# Patient Record
Sex: FEMALE | Race: WHITE | NOT HISPANIC OR LATINO | Employment: OTHER | URBAN - METROPOLITAN AREA
[De-identification: names, ages, dates, MRNs, and addresses within clinical notes are randomized per-mention and may not be internally consistent; named-entity substitution may affect disease eponyms.]

---

## 2018-03-16 ENCOUNTER — TELEPHONE (OUTPATIENT)
Dept: FAMILY MEDICINE | Facility: CLINIC | Age: 48
End: 2018-03-16

## 2018-03-16 RX ORDER — DESONIDE 0.5 MG/G
OINTMENT TOPICAL 2 TIMES DAILY
Qty: 60 G | Refills: 0 | Status: SHIPPED | OUTPATIENT
Start: 2018-03-16 | End: 2018-11-07 | Stop reason: ENTERED-IN-ERROR

## 2018-03-16 RX ORDER — LEVOTHYROXINE SODIUM 112 UG/1
112 TABLET ORAL DAILY
Qty: 90 TABLET | Refills: 1 | Status: SHIPPED | OUTPATIENT
Start: 2018-03-16 | End: 2018-09-05 | Stop reason: SDUPTHER

## 2018-03-16 RX ORDER — DESONIDE 0.5 MG/G
0.05 OINTMENT TOPICAL
COMMUNITY
Start: 2018-01-15 | End: 2018-03-16 | Stop reason: SDUPTHER

## 2018-03-16 RX ORDER — LEVOTHYROXINE SODIUM 112 UG/1
TABLET ORAL
COMMUNITY
Start: 2017-06-20 | End: 2018-03-16 | Stop reason: SDUPTHER

## 2018-03-16 NOTE — TELEPHONE ENCOUNTER
Patient stopped in to request refill of Synthroid and Cream for allergic reactions on face (is working, uses PRN)- [facility] [address] [facility]. Patient # with Qs . dk

## 2018-03-16 NOTE — TELEPHONE ENCOUNTER
Noted these were sent over the [facility], could not find one on [address].

## 2018-05-04 ENCOUNTER — TRANSCRIBE ORDERS (OUTPATIENT)
Dept: RADIOLOGY | Age: 48
End: 2018-05-04

## 2018-05-04 ENCOUNTER — APPOINTMENT (OUTPATIENT)
Dept: RADIOLOGY | Age: 48
End: 2018-05-04
Attending: ORTHOPAEDIC SURGERY
Payer: COMMERCIAL

## 2018-05-04 DIAGNOSIS — Z96.651 PRESENCE OF RIGHT ARTIFICIAL KNEE JOINT: Primary | ICD-10-CM

## 2018-05-04 DIAGNOSIS — Z96.651 PRESENCE OF RIGHT ARTIFICIAL KNEE JOINT: ICD-10-CM

## 2018-05-04 PROCEDURE — 73564 X-RAY EXAM KNEE 4 OR MORE: CPT | Mod: RT

## 2018-09-07 RX ORDER — LEVOTHYROXINE SODIUM 112 UG/1
TABLET ORAL
Qty: 90 TABLET | Refills: 0 | Status: SHIPPED | OUTPATIENT
Start: 2018-09-07 | End: 2018-12-06 | Stop reason: SDUPTHER

## 2018-10-10 ENCOUNTER — OFFICE VISIT (OUTPATIENT)
Dept: FAMILY MEDICINE | Facility: CLINIC | Age: 48
End: 2018-10-10
Payer: COMMERCIAL

## 2018-10-10 VITALS
TEMPERATURE: 97.7 F | OXYGEN SATURATION: 99 % | SYSTOLIC BLOOD PRESSURE: 122 MMHG | DIASTOLIC BLOOD PRESSURE: 86 MMHG | HEART RATE: 66 BPM | WEIGHT: 120 LBS

## 2018-10-10 DIAGNOSIS — Z23 NEED FOR IMMUNIZATION AGAINST INFLUENZA: ICD-10-CM

## 2018-10-10 DIAGNOSIS — M62.830 SPASM OF MUSCLE OF LOWER BACK: Primary | ICD-10-CM

## 2018-10-10 PROCEDURE — 99213 OFFICE O/P EST LOW 20 MIN: CPT | Mod: 25 | Performed by: FAMILY MEDICINE

## 2018-10-10 PROCEDURE — 90686 IIV4 VACC NO PRSV 0.5 ML IM: CPT | Performed by: FAMILY MEDICINE

## 2018-10-10 PROCEDURE — 90471 IMMUNIZATION ADMIN: CPT | Performed by: FAMILY MEDICINE

## 2018-10-10 RX ORDER — CYCLOBENZAPRINE HCL 5 MG
5 TABLET ORAL 3 TIMES DAILY PRN
Qty: 21 TABLET | Refills: 0 | Status: SHIPPED | OUTPATIENT
Start: 2018-10-10 | End: 2018-11-07 | Stop reason: ENTERED-IN-ERROR

## 2018-10-10 RX ORDER — OLOPATADINE HYDROCHLORIDE 1 MG/ML
0.1 SOLUTION/ DROPS OPHTHALMIC DAILY PRN
Status: ON HOLD | COMMUNITY
Start: 2018-01-02 | End: 2018-12-18

## 2018-10-10 RX ORDER — NAPROXEN 500 MG/1
500 TABLET ORAL 2 TIMES DAILY WITH MEALS
Qty: 28 TABLET | Refills: 0 | Status: SHIPPED | OUTPATIENT
Start: 2018-10-10 | End: 2018-11-07 | Stop reason: ENTERED-IN-ERROR

## 2018-10-10 ASSESSMENT — ENCOUNTER SYMPTOMS
DIARRHEA: 0
WEIGHT LOSS: 0
SHORTNESS OF BREATH: 0
ABDOMINAL DISTENTION: 0
HEADACHES: 0
WEAKNESS: 0
VOMITING: 0
LIGHT-HEADEDNESS: 0
FATIGUE: 0
FEVER: 0
BLOOD IN STOOL: 0
PARESIS: 0
TROUBLE SWALLOWING: 0
NUMBNESS: 0
ADENOPATHY: 0
PALPITATIONS: 0
DIFFICULTY URINATING: 0
CONSTIPATION: 0
COUGH: 0
PERIANAL NUMBNESS: 0
TINGLING: 0
PARESTHESIAS: 0
DIZZINESS: 0
BOWEL INCONTINENCE: 0
UNEXPECTED WEIGHT CHANGE: 0
NAUSEA: 0
BACK PAIN: 1
ABDOMINAL PAIN: 0
LEG PAIN: 0
DYSURIA: 0
WHEEZING: 0

## 2018-10-10 NOTE — PATIENT INSTRUCTIONS
Patient Education     Back Pain, Adult  Back pain is very common in adults. The cause of back pain is rarely dangerous and the pain often gets better over time. The cause of your back pain may not be known. Some common causes of back pain include:  · Strain of the muscles or ligaments supporting the spine.  · Wear and tear (degeneration) of the spinal disks.  · Arthritis.  · Direct injury to the back.  For many people, back pain may return. Since back pain is rarely dangerous, most people can learn to manage this condition on their own.  Follow these instructions at home:  Watch your back pain for any changes. The following actions may help to lessen any discomfort you are feeling:  · Remain active. It is stressful on your back to sit or  one place for long periods of time. Do not sit, drive, or  one place for more than 30 minutes at a time. Take short walks on even surfaces as soon as you are able. Try to increase the length of time you walk each day.  · Exercise regularly as directed by your health care provider. Exercise helps your back heal faster. It also helps avoid future injury by keeping your muscles strong and flexible.  · Do not stay in bed. Resting more than 1-2 days can delay your recovery.  · Pay attention to your body when you bend and lift. The most comfortable positions are those that put less stress on your recovering back. Always use proper lifting techniques, including:  ¨ Bending your knees.  ¨ Keeping the load close to your body.  ¨ Avoiding twisting.  · Find a comfortable position to sleep. Use a firm mattress and lie on your side with your knees slightly bent. If you lie on your back, put a pillow under your knees.  · Avoid feeling anxious or stressed. Stress increases muscle tension and can worsen back pain. It is important to recognize when you are anxious or stressed and learn ways to manage it, such as with exercise.  · Take medicines only as directed by your health care  provider. Over-the-counter medicines to reduce pain and inflammation are often the most helpful. Your health care provider may prescribe muscle relaxant drugs. These medicines help dull your pain so you can more quickly return to your normal activities and healthy exercise.  · Apply ice to the injured area:  ¨ Put ice in a plastic bag.  ¨ Place a towel between your skin and the bag.  ¨ Leave the ice on for 20 minutes, 2-3 times a day for the first 2-3 days. After that, ice and heat may be alternated to reduce pain and spasms.  · Maintain a healthy weight. Excess weight puts extra stress on your back and makes it difficult to maintain good posture.  Contact a health care provider if:  · You have pain that is not relieved with rest or medicine.  · You have increasing pain going down into the legs or buttocks.  · You have pain that does not improve in one week.  · You have night pain.  · You lose weight.  · You have a fever or chills.  Get help right away if:  · You develop new bowel or bladder control problems.  · You have unusual weakness or numbness in your arms or legs.  · You develop nausea or vomiting.  · You develop abdominal pain.  · You feel faint.  This information is not intended to replace advice given to you by your health care provider. Make sure you discuss any questions you have with your health care provider.  Document Released: 12/18/2006 Document Revised: 04/27/2017 Document Reviewed: 04/21/2015  [employer] Interactive Patient Education © 2017 [employer]

## 2018-10-25 ENCOUNTER — TRANSCRIBE ORDERS (OUTPATIENT)
Dept: SCHEDULING | Age: 48
End: 2018-10-25

## 2018-10-25 DIAGNOSIS — Z12.31 ENCOUNTER FOR SCREENING MAMMOGRAM FOR MALIGNANT NEOPLASM OF BREAST: Primary | ICD-10-CM

## 2018-10-29 ENCOUNTER — HOSPITAL ENCOUNTER (OUTPATIENT)
Dept: RADIOLOGY | Age: 48
Discharge: HOME | End: 2018-10-29
Attending: OBSTETRICS & GYNECOLOGY
Payer: COMMERCIAL

## 2018-10-29 DIAGNOSIS — Z12.31 ENCOUNTER FOR SCREENING MAMMOGRAM FOR MALIGNANT NEOPLASM OF BREAST: ICD-10-CM

## 2018-10-29 PROCEDURE — 77067 SCR MAMMO BI INCL CAD: CPT

## 2018-11-07 ENCOUNTER — OFFICE VISIT (OUTPATIENT)
Dept: FAMILY MEDICINE | Facility: CLINIC | Age: 48
End: 2018-11-07
Attending: FAMILY MEDICINE
Payer: COMMERCIAL

## 2018-11-07 VITALS
TEMPERATURE: 98 F | HEART RATE: 72 BPM | HEIGHT: 57 IN | WEIGHT: 116 LBS | BODY MASS INDEX: 25.03 KG/M2 | RESPIRATION RATE: 16 BRPM | OXYGEN SATURATION: 98 % | DIASTOLIC BLOOD PRESSURE: 68 MMHG | SYSTOLIC BLOOD PRESSURE: 120 MMHG

## 2018-11-07 DIAGNOSIS — Z00.00 ROUTINE PHYSICAL EXAMINATION: Primary | ICD-10-CM

## 2018-11-07 DIAGNOSIS — E03.9 ACQUIRED HYPOTHYROIDISM: ICD-10-CM

## 2018-11-07 PROCEDURE — 99396 PREV VISIT EST AGE 40-64: CPT | Performed by: FAMILY MEDICINE

## 2018-11-07 NOTE — PROGRESS NOTES
Subjective      Patient ID: Sharon Morales is a 48 y.o. female.    She is here for Pe. Pt has been doing well overall. She is trying to follow well balanced meals. Stays well hydrated. She is active 6x wk with swimming/weights and cardio. Average 7-8hrs of sleep a night. Normal BMs and urination. Normal menstrual cycles. UTD with gyn care. UTD with eye and dental exam. Normal BMs and urination. Due for colonoscopy soon. She is having another knee surgery next month. Due for fasting labs.         The following have been reviewed and updated as appropriate in this visit:  Tobacco  Allergies  Meds  Problems  Med Hx  Surg Hx  Fam Hx       Review of Systems    Current Outpatient Prescriptions   Medication Sig Dispense Refill   • levothyroxine (SYNTHROID) 112 mcg tablet TAKE 1 TABLET BY MOUTH EVERY DAY 90 tablet 0   • olopatadine (PATANOL) 0.1 % ophthalmic solution 0.1 %.       No current facility-administered medications for this visit.      Past Medical History:   Diagnosis Date   • Hypothyroidism      Family History   Problem Relation Age of Onset   • Stomach cancer Mother    • Hypertension Mother    • Skin cancer Father      Past Surgical History:   Procedure Laterality Date   •  SECTION     • FOOT SURGERY     • JOINT REPLACEMENT Bilateral    • TOTAL KNEE ARTHROPLASTY Right 2018    replace previous knee replacement     Social History     Social History   • Marital status:      Spouse name: N/A   • Number of children: N/A   • Years of education: N/A     Occupational History   • Not on file.     Social History Main Topics   • Smoking status: Never Smoker   • Smokeless tobacco: Never Used   • Alcohol use Yes   • Drug use: No   • Sexual activity: Not on file     Other Topics Concern   • Not on file     Social History Narrative   • No narrative on file     Allergies   Allergen Reactions   • Avocado      Other reaction(s): vomiting       Objective   /68 (BP Location: Right upper arm, Patient  "Position: Sitting)   Pulse 72   Temp 36.7 °C (98 °F) (Oral)   Resp 16   Ht 1.448 m (4' 9\")   Wt 52.6 kg (116 lb)   LMP 10/09/2018 (Exact Date)   SpO2 98%   BMI 25.10 kg/m²   Physical Exam   Constitutional: She is oriented to person, place, and time. She appears well-developed and well-nourished.   HENT:   Head: Normocephalic and atraumatic.   Right Ear: External ear normal.   Left Ear: External ear normal.   Eyes: Conjunctivae are normal. Pupils are equal, round, and reactive to light.   Neck: Normal range of motion. Neck supple. No thyromegaly present.   Cardiovascular: Normal rate, regular rhythm and normal heart sounds.    No murmur heard.  Pulmonary/Chest: Effort normal and breath sounds normal. She has no wheezes.   Abdominal: Soft. Bowel sounds are normal. There is no tenderness.   Musculoskeletal: Normal range of motion. She exhibits no tenderness.   Lymphadenopathy:     She has no cervical adenopathy.   Neurological: She is alert and oriented to person, place, and time. No cranial nerve deficit.   Skin: Skin is warm and dry. No rash noted.   Psychiatric: She has a normal mood and affect.   Nursing note and vitals reviewed.      Assessment/Plan   Problem List Items Addressed This Visit     Acquired hypothyroidism    Relevant Orders    TSH 3rd Generation    T4, free      Other Visit Diagnoses     Routine physical examination    -  Primary    Relevant Orders    Comprehensive metabolic panel    TSH 3rd Generation    CBC    Lipid panel    Urinalysis with Reflex Culture      She is doing well overall. Discussed diet and exercise. UTD with gyn care. UTD with eye and dental exam. UTD with immunizations. Will check fasting labs. She will set up colonoscopy for this yr or early next yr.   Addendum: She had EKG done which is stable from prior one along with labs which are ok but white count alittle on lower end but discussed with hematologist and patient is ok to move forward with upcoming surgery.    Natalia PORTILLO" DO Katie  11/7/2018

## 2018-11-09 ENCOUNTER — TELEPHONE (OUTPATIENT)
Dept: FAMILY MEDICINE | Facility: CLINIC | Age: 48
End: 2018-11-09

## 2018-11-09 DIAGNOSIS — D70.9 NEUTROPENIA, UNSPECIFIED TYPE (CMS/HCC): ICD-10-CM

## 2018-11-09 DIAGNOSIS — R31.9 HEMATURIA, UNSPECIFIED TYPE: Primary | ICD-10-CM

## 2018-11-10 LAB
ALBUMIN SERPL-MCNC: 4.1 G/DL (ref 3.6–5.1)
ALBUMIN/GLOB SERPL: 1.6 (CALC) (ref 1–2.5)
ALP SERPL-CCNC: 75 U/L (ref 33–130)
ALT SERPL-CCNC: 11 U/L (ref 6–29)
APPEARANCE UR: ABNORMAL
AST SERPL-CCNC: 21 U/L (ref 10–35)
BACTERIA #/AREA URNS HPF: ABNORMAL /HPF
BACTERIA UR CULT: ABNORMAL
BACTERIA UR CULT: NORMAL
BILIRUB SERPL-MCNC: 0.4 MG/DL (ref 0.2–1.2)
BILIRUB UR QL STRIP: NEGATIVE
BUN SERPL-MCNC: 16 MG/DL (ref 7–25)
BUN/CREAT SERPL: NORMAL (CALC) (ref 6–22)
CALCIUM SERPL-MCNC: 9.1 MG/DL (ref 8.6–10.4)
CHLORIDE SERPL-SCNC: 107 MMOL/L (ref 98–110)
CHOLEST SERPL-MCNC: 169 MG/DL
CHOLEST/HDLC SERPL: 2.5 (CALC)
CO2 SERPL-SCNC: 26 MMOL/L (ref 20–32)
COLOR UR: YELLOW
CREAT SERPL-MCNC: 0.65 MG/DL (ref 0.5–1.05)
ERYTHROCYTE [DISTWIDTH] IN BLOOD BY AUTOMATED COUNT: 13.8 % (ref 11–15)
GFR SERPL CREATININE-BSD FRML MDRD: 104 ML/MIN/1.73M2
GLOBULIN SER CALC-MCNC: 2.5 G/DL (CALC) (ref 1.9–3.7)
GLUCOSE SERPL-MCNC: 84 MG/DL (ref 65–99)
GLUCOSE UR QL STRIP: NEGATIVE
HCT VFR BLD AUTO: 38.3 % (ref 35–45)
HDLC SERPL-MCNC: 68 MG/DL
HGB BLD-MCNC: 12.2 G/DL (ref 11.7–15.5)
HGB UR QL STRIP: ABNORMAL
HYALINE CASTS #/AREA URNS LPF: ABNORMAL /LPF
KETONES UR QL STRIP: NEGATIVE
LDLC SERPL CALC-MCNC: 87 MG/DL (CALC)
LEUKOCYTE ESTERASE UR QL STRIP: ABNORMAL
MCH RBC QN AUTO: 27.6 PG (ref 27–33)
MCHC RBC AUTO-ENTMCNC: 31.9 G/DL (ref 32–36)
MCV RBC AUTO: 86.7 FL (ref 80–100)
NITRITE UR QL STRIP: NEGATIVE
NONHDLC SERPL-MCNC: 101 MG/DL (CALC)
PH UR STRIP: 6.5 [PH] (ref 5–8)
PLATELET # BLD AUTO: 265 THOUSAND/UL (ref 140–400)
PMV BLD REES-ECKER: 10.7 FL (ref 7.5–12.5)
POTASSIUM SERPL-SCNC: 4.6 MMOL/L (ref 3.5–5.3)
PROT SERPL-MCNC: 6.6 G/DL (ref 6.1–8.1)
PROT UR QL STRIP: NEGATIVE
RBC # BLD AUTO: 4.42 MILLION/UL (ref 3.8–5.1)
RBC #/AREA URNS HPF: ABNORMAL /HPF
SODIUM SERPL-SCNC: 142 MMOL/L (ref 135–146)
SP GR UR STRIP: 1.01 (ref 1–1.03)
SQUAMOUS #/AREA URNS HPF: ABNORMAL /HPF
T4 FREE SERPL-MCNC: 1.5 NG/DL (ref 0.8–1.8)
TRIGL SERPL-MCNC: 49 MG/DL
TSH SERPL-ACNC: 0.45 MIU/L
WBC # BLD AUTO: 2.6 THOUSAND/UL (ref 3.8–10.8)
WBC #/AREA URNS HPF: ABNORMAL /HPF

## 2018-11-11 ENCOUNTER — TELEPHONE (OUTPATIENT)
Dept: FAMILY MEDICINE | Facility: CLINIC | Age: 48
End: 2018-11-11

## 2018-11-12 NOTE — TELEPHONE ENCOUNTER
Please let her know labs ok except white count is low at 2.6 goal >3.8 but has been low in the past as well and urine does show blood in it as well- so check to see if she was on her period or if any UTI symptoms. I would like the white count and urine repeated in 2wks - need to have this redone before upcoming surgery !

## 2018-11-12 NOTE — TELEPHONE ENCOUNTER
Patient notified of labs and will repeat again in 2 weeks; patient notes she was on the end of her menses; also she is asking if she needs to come in for a pre-op or if you are willing to sign off on surgery as it will be past 30 days.

## 2018-11-27 ENCOUNTER — TELEPHONE (OUTPATIENT)
Dept: FAMILY MEDICINE | Facility: CLINIC | Age: 48
End: 2018-11-27

## 2018-11-27 DIAGNOSIS — D70.9 NEUTROPENIA, UNSPECIFIED TYPE (CMS/HCC): Primary | ICD-10-CM

## 2018-11-28 LAB
APPEARANCE UR: CLEAR
BACTERIA #/AREA URNS HPF: ABNORMAL /HPF
BACTERIA UR CULT: NORMAL
BACTERIA UR CULT: NORMAL
BASOPHILS # BLD AUTO: 21 CELLS/UL (ref 0–200)
BASOPHILS NFR BLD AUTO: 0.7 %
BILIRUB UR QL STRIP: NEGATIVE
COLOR UR: YELLOW
EOSINOPHIL # BLD AUTO: 210 CELLS/UL (ref 15–500)
EOSINOPHIL NFR BLD AUTO: 7 %
ERYTHROCYTE [DISTWIDTH] IN BLOOD BY AUTOMATED COUNT: 13.8 % (ref 11–15)
GLUCOSE UR QL STRIP: NEGATIVE
HCT VFR BLD AUTO: 38.8 % (ref 35–45)
HGB BLD-MCNC: 12.5 G/DL (ref 11.7–15.5)
HGB UR QL STRIP: NEGATIVE
HYALINE CASTS #/AREA URNS LPF: ABNORMAL /LPF
KETONES UR QL STRIP: NEGATIVE
LEUKOCYTE ESTERASE UR QL STRIP: ABNORMAL
LYMPHOCYTES # BLD AUTO: 1158 CELLS/UL (ref 850–3900)
LYMPHOCYTES NFR BLD AUTO: 38.6 %
MCH RBC QN AUTO: 28.5 PG (ref 27–33)
MCHC RBC AUTO-ENTMCNC: 32.2 G/DL (ref 32–36)
MCV RBC AUTO: 88.4 FL (ref 80–100)
MONOCYTES # BLD AUTO: 402 CELLS/UL (ref 200–950)
MONOCYTES NFR BLD AUTO: 13.4 %
NEUTROPHILS # BLD AUTO: 1209 CELLS/UL (ref 1500–7800)
NEUTROPHILS NFR BLD AUTO: 40.3 %
NITRITE UR QL STRIP: NEGATIVE
PH UR STRIP: 6.5 [PH] (ref 5–8)
PLATELET # BLD AUTO: 233 THOUSAND/UL (ref 140–400)
PMV BLD REES-ECKER: 10.6 FL (ref 7.5–12.5)
PROT UR QL STRIP: NEGATIVE
RBC # BLD AUTO: 4.39 MILLION/UL (ref 3.8–5.1)
RBC #/AREA URNS HPF: ABNORMAL /HPF
SP GR UR STRIP: 1.01 (ref 1–1.03)
SQUAMOUS #/AREA URNS HPF: ABNORMAL /HPF
WBC # BLD AUTO: 3 THOUSAND/UL (ref 3.8–10.8)
WBC #/AREA URNS HPF: ABNORMAL /HPF

## 2018-11-28 NOTE — TELEPHONE ENCOUNTER
White count getting better and talked to heme and will recheck next wk but looks like ok to go through with surgery

## 2018-11-28 NOTE — TELEPHONE ENCOUNTER
Patient aware; she has to get labs for pre-op on Friday (and is leaving next week and returning 5 days prior to surgery), so she is going to repeat CBC this Friday at [place]

## 2018-11-30 ENCOUNTER — HOSPITAL ENCOUNTER (OUTPATIENT)
Dept: CARDIOLOGY | Facility: HOSPITAL | Age: 48
Discharge: HOME | End: 2018-11-30
Attending: ORTHOPAEDIC SURGERY
Payer: COMMERCIAL

## 2018-11-30 ENCOUNTER — APPOINTMENT (OUTPATIENT)
Dept: LAB | Facility: HOSPITAL | Age: 48
End: 2018-11-30
Attending: ORTHOPAEDIC SURGERY
Payer: COMMERCIAL

## 2018-11-30 ENCOUNTER — APPOINTMENT (OUTPATIENT)
Dept: LAB | Facility: HOSPITAL | Age: 48
End: 2018-11-30
Attending: FAMILY MEDICINE
Payer: COMMERCIAL

## 2018-11-30 ENCOUNTER — TRANSCRIBE ORDERS (OUTPATIENT)
Dept: REGISTRATION | Facility: HOSPITAL | Age: 48
End: 2018-11-30

## 2018-11-30 DIAGNOSIS — D70.9 NEUTROPENIA, UNSPECIFIED TYPE (CMS/HCC): ICD-10-CM

## 2018-11-30 DIAGNOSIS — Z96.652 PRESENCE OF LEFT ARTIFICIAL KNEE JOINT: ICD-10-CM

## 2018-11-30 DIAGNOSIS — M25.662 STIFFNESS OF LEFT KNEE, NOT ELSEWHERE CLASSIFIED: Primary | ICD-10-CM

## 2018-11-30 DIAGNOSIS — M25.662 STIFFNESS OF LEFT KNEE, NOT ELSEWHERE CLASSIFIED: ICD-10-CM

## 2018-11-30 DIAGNOSIS — Z00.00 ROUTINE PHYSICAL EXAMINATION: ICD-10-CM

## 2018-11-30 LAB
ALBUMIN SERPL-MCNC: 4 G/DL (ref 3.4–5)
ALP SERPL-CCNC: 69 IU/L (ref 35–126)
ALT SERPL-CCNC: 19 IU/L (ref 11–54)
ANION GAP SERPL CALC-SCNC: 10 MEQ/L (ref 3–15)
AST SERPL-CCNC: 30 IU/L (ref 15–41)
BACTERIA URNS QL MICRO: 1 /HPF
BASOPHILS # BLD: 0.03 K/UL (ref 0.01–0.1)
BASOPHILS NFR BLD: 1 %
BILIRUB SERPL-MCNC: 0.6 MG/DL (ref 0.3–1.2)
BILIRUB UR QL STRIP.AUTO: NEGATIVE MG/DL
BUN SERPL-MCNC: 15 MG/DL (ref 8–20)
BURR CELLS BLD QL SMEAR: ABNORMAL
CALCIUM SERPL-MCNC: 9.3 MG/DL (ref 8.9–10.3)
CHLORIDE SERPL-SCNC: 101 MEQ/L (ref 98–109)
CLARITY UR REFRACT.AUTO: CLEAR
CO2 SERPL-SCNC: 26 MEQ/L (ref 22–32)
COLOR UR AUTO: YELLOW
CREAT SERPL-MCNC: 0.6 MG/DL
DIFFERENTIAL METHOD BLD: ABNORMAL
EOSINOPHIL # BLD: 0.08 K/UL (ref 0.04–0.36)
EOSINOPHIL NFR BLD: 2.6 %
ERYTHROCYTE [DISTWIDTH] IN BLOOD BY AUTOMATED COUNT: 14.2 % (ref 11.7–14.4)
GFR SERPL CREATININE-BSD FRML MDRD: >60 ML/MIN/1.73M*2
GLUCOSE SERPL-MCNC: 81 MG/DL (ref 70–99)
GLUCOSE UR STRIP.AUTO-MCNC: NEGATIVE MG/DL
HCT VFR BLDCO AUTO: 39.5 %
HGB BLD-MCNC: 12.4 G/DL
HGB UR QL STRIP.AUTO: NEGATIVE
HYALINE CASTS #/AREA URNS LPF: ABNORMAL /LPF
IMM GRANULOCYTES # BLD AUTO: 0.01 K/UL (ref 0–0.08)
IMM GRANULOCYTES NFR BLD AUTO: 0.3 %
KETONES UR STRIP.AUTO-MCNC: ABNORMAL MG/DL
LEUKOCYTE ESTERASE UR QL STRIP.AUTO: ABNORMAL
LYMPHOCYTES # BLD: 1.05 K/UL (ref 1.2–3.5)
LYMPHOCYTES NFR BLD: 34.8 %
MCH RBC QN AUTO: 27.4 PG (ref 28–33.2)
MCHC RBC AUTO-ENTMCNC: 31.4 G/DL (ref 32.2–35.5)
MCV RBC AUTO: 87.2 FL (ref 83–98)
MONOCYTES # BLD: 0.4 K/UL (ref 0.28–0.8)
MONOCYTES NFR BLD: 13.2 %
NEUTROPHILS # BLD: 1.45 K/UL (ref 1.7–7)
NEUTS SEG NFR BLD: 48.1 %
NITRITE UR QL STRIP.AUTO: NEGATIVE
NRBC BLD-RTO: 0 %
PDW BLD AUTO: 11 FL (ref 9.4–12.3)
PH UR STRIP.AUTO: 6.5 [PH]
PLAT MORPH BLD: NORMAL
PLATELET # BLD AUTO: 222 K/UL
PLATELET # BLD EST: ABNORMAL 10*3/UL
POLYCHROMASIA BLD QL SMEAR: ABNORMAL
POTASSIUM SERPL-SCNC: 4.6 MEQ/L (ref 3.6–5.1)
PROT SERPL-MCNC: 6.5 G/DL (ref 6–8.2)
PROT UR QL STRIP.AUTO: NEGATIVE
RBC # BLD AUTO: 4.53 M/UL (ref 3.93–5.22)
RBC #/AREA URNS HPF: ABNORMAL /HPF
SODIUM SERPL-SCNC: 137 MEQ/L (ref 136–144)
SP GR UR REFRACT.AUTO: 1.01
SQUAMOUS URNS QL MICRO: 1 /HPF
UROBILINOGEN UR STRIP-ACNC: 0.2 EU/DL
WBC # BLD AUTO: 3.02 K/UL
WBC #/AREA URNS HPF: ABNORMAL /HPF

## 2018-11-30 PROCEDURE — 85025 COMPLETE CBC W/AUTO DIFF WBC: CPT

## 2018-11-30 PROCEDURE — 80053 COMPREHEN METABOLIC PANEL: CPT

## 2018-11-30 PROCEDURE — 36415 COLL VENOUS BLD VENIPUNCTURE: CPT

## 2018-11-30 PROCEDURE — 87086 URINE CULTURE/COLONY COUNT: CPT

## 2018-11-30 PROCEDURE — 81003 URINALYSIS AUTO W/O SCOPE: CPT

## 2018-11-30 PROCEDURE — 93005 ELECTROCARDIOGRAM TRACING: CPT

## 2018-12-01 LAB
ATRIAL RATE: 65
P AXIS: 6
PR INTERVAL: 120
QRS DURATION: 76
QT INTERVAL: 404
QTC CALCULATION(BAZETT): 420
R AXIS: 0
T WAVE AXIS: -1
VENTRICULAR RATE: 65

## 2018-12-01 PROCEDURE — 93010 ELECTROCARDIOGRAM REPORT: CPT | Performed by: INTERNAL MEDICINE

## 2018-12-02 LAB — BACTERIA UR CULT: NORMAL

## 2018-12-03 ENCOUNTER — TELEPHONE (OUTPATIENT)
Dept: FAMILY MEDICINE | Facility: CLINIC | Age: 48
End: 2018-12-03

## 2018-12-03 ENCOUNTER — ANESTHESIA EVENT (OUTPATIENT)
Dept: OPERATING ROOM | Facility: HOSPITAL | Age: 48
Setting detail: HOSPITAL OUTPATIENT SURGERY
DRG: 467 | End: 2018-12-03
Payer: COMMERCIAL

## 2018-12-03 NOTE — TELEPHONE ENCOUNTER
Please let her know repeat white count is coming back stable at 3 which is on the low end (range 3.8-10) but I am clearing her to move forward with the surgery and we may need to see hematologist after surgery if white count stays low

## 2018-12-18 ENCOUNTER — ANESTHESIA (OUTPATIENT)
Dept: OPERATING ROOM | Facility: HOSPITAL | Age: 48
Setting detail: HOSPITAL OUTPATIENT SURGERY
DRG: 467 | End: 2018-12-18
Payer: COMMERCIAL

## 2018-12-18 ENCOUNTER — HOSPITAL ENCOUNTER (INPATIENT)
Facility: HOSPITAL | Age: 48
LOS: 1 days | Discharge: HOME | DRG: 467 | End: 2018-12-19
Attending: ORTHOPAEDIC SURGERY | Admitting: ORTHOPAEDIC SURGERY
Payer: COMMERCIAL

## 2018-12-18 ENCOUNTER — APPOINTMENT (INPATIENT)
Dept: RADIOLOGY | Facility: HOSPITAL | Age: 48
DRG: 467 | End: 2018-12-18
Attending: ORTHOPAEDIC SURGERY
Payer: COMMERCIAL

## 2018-12-18 DIAGNOSIS — M25.662 KNEE STIFFNESS, LEFT: ICD-10-CM

## 2018-12-18 DIAGNOSIS — Z96.652 S/P TKR (TOTAL KNEE REPLACEMENT), LEFT: ICD-10-CM

## 2018-12-18 PROBLEM — R52 PAIN MANAGEMENT: Status: ACTIVE | Noted: 2018-12-18

## 2018-12-18 PROBLEM — D72.819 LEUKOPENIA: Status: ACTIVE | Noted: 2018-12-18

## 2018-12-18 PROBLEM — K59.00 CONSTIPATION: Status: ACTIVE | Noted: 2018-12-18

## 2018-12-18 PROBLEM — Z91.89 AT RISK FOR VENOUS THROMBOEMBOLISM (VTE): Status: ACTIVE | Noted: 2018-12-18

## 2018-12-18 LAB
ABO + RH BLD: NORMAL
B-HCG UR QL: NEGATIVE
BLD GP AB SCN SERPL QL: NEGATIVE
D AG BLD QL: NEGATIVE
LABORATORY COMMENT REPORT: NORMAL
POCT TEST: NORMAL

## 2018-12-18 PROCEDURE — 87070 CULTURE OTHR SPECIMN AEROBIC: CPT | Performed by: ORTHOPAEDIC SURGERY

## 2018-12-18 PROCEDURE — 0SPW0JZ REMOVAL OF SYNTHETIC SUBSTITUTE FROM LEFT KNEE JOINT, TIBIAL SURFACE, OPEN APPROACH: ICD-10-PCS | Performed by: ORTHOPAEDIC SURGERY

## 2018-12-18 PROCEDURE — 87205 SMEAR GRAM STAIN: CPT | Performed by: ORTHOPAEDIC SURGERY

## 2018-12-18 PROCEDURE — 36000005 HC OR LEVEL 5 INITIAL 30MIN: Performed by: ORTHOPAEDIC SURGERY

## 2018-12-18 PROCEDURE — 20600000 HC ROOM AND CARE INTERMEDIATE/TELEMETRY

## 2018-12-18 PROCEDURE — 37000010 HC ANESTHESIA SPINAL: Performed by: ORTHOPAEDIC SURGERY

## 2018-12-18 PROCEDURE — 12000000 HC ROOM AND CARE MED/SURG

## 2018-12-18 PROCEDURE — G8978 MOBILITY CURRENT STATUS: HCPCS | Mod: GP,CJ

## 2018-12-18 PROCEDURE — 0SRU0J9 REPLACEMENT OF LEFT KNEE JOINT, FEMORAL SURFACE WITH SYNTHETIC SUBSTITUTE, CEMENTED, OPEN APPROACH: ICD-10-PCS | Performed by: ORTHOPAEDIC SURGERY

## 2018-12-18 PROCEDURE — 25000000 HC PHARMACY GENERAL: Performed by: NURSE ANESTHETIST, CERTIFIED REGISTERED

## 2018-12-18 PROCEDURE — 63600000 HC DRUGS/DETAIL CODE: Performed by: ANESTHESIOLOGY

## 2018-12-18 PROCEDURE — 0SUW09Z SUPPLEMENT LEFT KNEE JOINT, TIBIAL SURFACE WITH LINER, OPEN APPROACH: ICD-10-PCS | Performed by: ORTHOPAEDIC SURGERY

## 2018-12-18 PROCEDURE — 63600000 HC DRUGS/DETAIL CODE: Performed by: ORTHOPAEDIC SURGERY

## 2018-12-18 PROCEDURE — 25800000 HC PHARMACY IV SOLUTIONS: Performed by: ORTHOPAEDIC SURGERY

## 2018-12-18 PROCEDURE — 87116 MYCOBACTERIA CULTURE: CPT | Performed by: ORTHOPAEDIC SURGERY

## 2018-12-18 PROCEDURE — 97161 PT EVAL LOW COMPLEX 20 MIN: CPT | Mod: GP

## 2018-12-18 PROCEDURE — 25000000 HC PHARMACY GENERAL: Performed by: ORTHOPAEDIC SURGERY

## 2018-12-18 PROCEDURE — 0SRW0J9 REPLACEMENT OF LEFT KNEE JOINT, TIBIAL SURFACE WITH SYNTHETIC SUBSTITUTE, CEMENTED, OPEN APPROACH: ICD-10-PCS | Performed by: ORTHOPAEDIC SURGERY

## 2018-12-18 PROCEDURE — G8989 SELF CARE D/C STATUS: HCPCS | Mod: GO,CI

## 2018-12-18 PROCEDURE — 87206 SMEAR FLUORESCENT/ACID STAI: CPT | Performed by: ORTHOPAEDIC SURGERY

## 2018-12-18 PROCEDURE — 86850 RBC ANTIBODY SCREEN: CPT

## 2018-12-18 PROCEDURE — 63600000 HC DRUGS/DETAIL CODE: Mod: JW | Performed by: NURSE ANESTHETIST, CERTIFIED REGISTERED

## 2018-12-18 PROCEDURE — C1776 JOINT DEVICE (IMPLANTABLE): HCPCS | Performed by: ORTHOPAEDIC SURGERY

## 2018-12-18 PROCEDURE — 63700000 HC SELF-ADMINISTRABLE DRUG: Performed by: ORTHOPAEDIC SURGERY

## 2018-12-18 PROCEDURE — 36000015 HC OR LEVEL 5 EA ADDL MIN: Performed by: ORTHOPAEDIC SURGERY

## 2018-12-18 PROCEDURE — 27200000 HC STERILE SUPPLY: Performed by: ORTHOPAEDIC SURGERY

## 2018-12-18 PROCEDURE — G8988 SELF CARE GOAL STATUS: HCPCS | Mod: GO,CI

## 2018-12-18 PROCEDURE — G8987 SELF CARE CURRENT STATUS: HCPCS | Mod: GO,CI

## 2018-12-18 PROCEDURE — 37000005 ANESTHESIA SPINAL BLOCK: Performed by: ANESTHESIOLOGY

## 2018-12-18 PROCEDURE — 73560 X-RAY EXAM OF KNEE 1 OR 2: CPT | Mod: LT

## 2018-12-18 PROCEDURE — 87102 FUNGUS ISOLATION CULTURE: CPT | Performed by: ORTHOPAEDIC SURGERY

## 2018-12-18 PROCEDURE — 36415 COLL VENOUS BLD VENIPUNCTURE: CPT | Performed by: ORTHOPAEDIC SURGERY

## 2018-12-18 PROCEDURE — G8979 MOBILITY GOAL STATUS: HCPCS | Mod: GP,CI

## 2018-12-18 PROCEDURE — C1713 ANCHOR/SCREW BN/BN,TIS/BN: HCPCS | Performed by: ORTHOPAEDIC SURGERY

## 2018-12-18 PROCEDURE — 63600000 HC DRUGS/DETAIL CODE: Performed by: NURSE ANESTHETIST, CERTIFIED REGISTERED

## 2018-12-18 PROCEDURE — 71000001 HC PACU PHASE 1 INITIAL 30MIN: Performed by: ORTHOPAEDIC SURGERY

## 2018-12-18 PROCEDURE — 71000011 HC PACU PHASE 1 EA ADDL MIN: Performed by: ORTHOPAEDIC SURGERY

## 2018-12-18 PROCEDURE — 0SPD09Z REMOVAL OF LINER FROM LEFT KNEE JOINT, OPEN APPROACH: ICD-10-PCS | Performed by: ORTHOPAEDIC SURGERY

## 2018-12-18 PROCEDURE — 97165 OT EVAL LOW COMPLEX 30 MIN: CPT | Mod: GO

## 2018-12-18 DEVICE — IMPLANTABLE DEVICE: Type: IMPLANTABLE DEVICE | Site: KNEE | Status: FUNCTIONAL

## 2018-12-18 DEVICE — CEMENTED STEM 12MMX120MM: Type: IMPLANTABLE DEVICE | Site: KNEE | Status: FUNCTIONAL

## 2018-12-18 DEVICE — CEMENT BONE SIMPLEX W/TOBRAMYCIN: Type: IMPLANTABLE DEVICE | Site: KNEE | Status: FUNCTIONAL

## 2018-12-18 DEVICE — CEMENTED STEM STRAIGHT 14MMX120MM: Type: IMPLANTABLE DEVICE | Site: KNEE | Status: FUNCTIONAL

## 2018-12-18 RX ORDER — BUPIVACAINE HYDROCHLORIDE 7.5 MG/ML
INJECTION, SOLUTION INTRASPINAL AS NEEDED
Status: DISCONTINUED | OUTPATIENT
Start: 2018-12-18 | End: 2018-12-18 | Stop reason: SURG

## 2018-12-18 RX ORDER — PREGABALIN 150 MG/1
150 CAPSULE ORAL ONCE
Status: COMPLETED | OUTPATIENT
Start: 2018-12-18 | End: 2018-12-18

## 2018-12-18 RX ORDER — MELOXICAM 7.5 MG/1
7.5 TABLET ORAL DAILY
Qty: 30 TABLET | Refills: 0 | Status: SHIPPED | OUTPATIENT
Start: 2018-12-18 | End: 2019-02-14 | Stop reason: ENTERED-IN-ERROR

## 2018-12-18 RX ORDER — KETAMINE HYDROCHLORIDE 10 MG/ML
INJECTION, SOLUTION INTRAMUSCULAR; INTRAVENOUS AS NEEDED
Status: DISCONTINUED | OUTPATIENT
Start: 2018-12-18 | End: 2018-12-18 | Stop reason: SURG

## 2018-12-18 RX ORDER — NAPROXEN SODIUM 220 MG/1
81 TABLET, FILM COATED ORAL 2 TIMES DAILY
Status: DISCONTINUED | OUTPATIENT
Start: 2018-12-18 | End: 2018-12-19 | Stop reason: HOSPADM

## 2018-12-18 RX ORDER — ACETAMINOPHEN 325 MG/1
650 TABLET ORAL 3 TIMES DAILY
Status: DISCONTINUED | OUTPATIENT
Start: 2018-12-18 | End: 2018-12-19 | Stop reason: HOSPADM

## 2018-12-18 RX ORDER — MAG HYDROX/ALUMINUM HYD/SIMETH 200-200-20
1 SUSPENSION, ORAL (FINAL DOSE FORM) ORAL 2 TIMES DAILY
Qty: 60 G | Refills: 0 | Status: SHIPPED | OUTPATIENT
Start: 2018-12-18 | End: 2018-12-19 | Stop reason: HOSPADM

## 2018-12-18 RX ORDER — DEXAMETHASONE SODIUM PHOSPHATE 4 MG/ML
10 INJECTION, SOLUTION INTRA-ARTICULAR; INTRALESIONAL; INTRAMUSCULAR; INTRAVENOUS; SOFT TISSUE DAILY
Status: DISCONTINUED | OUTPATIENT
Start: 2018-12-18 | End: 2018-12-19 | Stop reason: HOSPADM

## 2018-12-18 RX ORDER — FENTANYL CITRATE 50 UG/ML
50 INJECTION, SOLUTION INTRAMUSCULAR; INTRAVENOUS
Status: DISCONTINUED | OUTPATIENT
Start: 2018-12-18 | End: 2018-12-18 | Stop reason: HOSPADM

## 2018-12-18 RX ORDER — CELECOXIB 200 MG/1
400 CAPSULE ORAL ONCE
Status: COMPLETED | OUTPATIENT
Start: 2018-12-18 | End: 2018-12-18

## 2018-12-18 RX ORDER — LEVOTHYROXINE SODIUM 112 UG/1
112 TABLET ORAL
Status: DISCONTINUED | OUTPATIENT
Start: 2018-12-19 | End: 2018-12-19 | Stop reason: HOSPADM

## 2018-12-18 RX ORDER — DOXYCYCLINE 100 MG/1
100 CAPSULE ORAL 2 TIMES DAILY
Qty: 14 CAPSULE | Refills: 0 | Status: SHIPPED | OUTPATIENT
Start: 2018-12-18 | End: 2019-02-14 | Stop reason: ENTERED-IN-ERROR

## 2018-12-18 RX ORDER — AMOXICILLIN 250 MG
1 CAPSULE ORAL 2 TIMES DAILY
Status: DISCONTINUED | OUTPATIENT
Start: 2018-12-18 | End: 2018-12-19 | Stop reason: HOSPADM

## 2018-12-18 RX ORDER — OXYCODONE HCL 20 MG/1
20 TABLET, FILM COATED, EXTENDED RELEASE ORAL ONCE
Status: COMPLETED | OUTPATIENT
Start: 2018-12-18 | End: 2018-12-18

## 2018-12-18 RX ORDER — PROPOFOL 10 MG/ML
INJECTION, EMULSION INTRAVENOUS CONTINUOUS PRN
Status: DISCONTINUED | OUTPATIENT
Start: 2018-12-18 | End: 2018-12-18 | Stop reason: SURG

## 2018-12-18 RX ORDER — NAPROXEN SODIUM 220 MG/1
81 TABLET, FILM COATED ORAL 2 TIMES DAILY
Qty: 180 TABLET | Refills: 0 | COMMUNITY
Start: 2018-12-18 | End: 2019-01-29 | Stop reason: ALTCHOICE

## 2018-12-18 RX ORDER — HYDROMORPHONE HYDROCHLORIDE 1 MG/ML
.25-.5 INJECTION, SOLUTION INTRAMUSCULAR; INTRAVENOUS; SUBCUTANEOUS
Status: DISCONTINUED | OUTPATIENT
Start: 2018-12-18 | End: 2018-12-19 | Stop reason: HOSPADM

## 2018-12-18 RX ORDER — DEXTROSE 50 % IN WATER (D50W) INTRAVENOUS SYRINGE
25 AS NEEDED
Status: DISCONTINUED | OUTPATIENT
Start: 2018-12-18 | End: 2018-12-19 | Stop reason: HOSPADM

## 2018-12-18 RX ORDER — ALUMINUM HYDROXIDE, MAGNESIUM HYDROXIDE, AND SIMETHICONE 1200; 120; 1200 MG/30ML; MG/30ML; MG/30ML
30 SUSPENSION ORAL EVERY 4 HOURS PRN
Status: DISCONTINUED | OUTPATIENT
Start: 2018-12-18 | End: 2018-12-19 | Stop reason: HOSPADM

## 2018-12-18 RX ORDER — IBUPROFEN 200 MG
16-32 TABLET ORAL AS NEEDED
Status: DISCONTINUED | OUTPATIENT
Start: 2018-12-18 | End: 2018-12-19 | Stop reason: HOSPADM

## 2018-12-18 RX ORDER — ONDANSETRON 8 MG/1
8 TABLET, ORALLY DISINTEGRATING ORAL ONCE
Status: COMPLETED | OUTPATIENT
Start: 2018-12-18 | End: 2018-12-18

## 2018-12-18 RX ORDER — ONDANSETRON HYDROCHLORIDE 2 MG/ML
4 INJECTION, SOLUTION INTRAVENOUS EVERY 8 HOURS PRN
Status: DISCONTINUED | OUTPATIENT
Start: 2018-12-18 | End: 2018-12-19 | Stop reason: HOSPADM

## 2018-12-18 RX ORDER — MIDAZOLAM HYDROCHLORIDE 2 MG/2ML
INJECTION, SOLUTION INTRAMUSCULAR; INTRAVENOUS AS NEEDED
Status: DISCONTINUED | OUTPATIENT
Start: 2018-12-18 | End: 2018-12-18 | Stop reason: SURG

## 2018-12-18 RX ORDER — OXYCODONE HYDROCHLORIDE 5 MG/1
5 TABLET ORAL EVERY 4 HOURS PRN
Qty: 40 TABLET | Refills: 0 | Status: SHIPPED | OUTPATIENT
Start: 2018-12-18 | End: 2019-02-14 | Stop reason: ENTERED-IN-ERROR

## 2018-12-18 RX ORDER — FAMOTIDINE 20 MG/1
20 TABLET, FILM COATED ORAL ONCE
Status: COMPLETED | OUTPATIENT
Start: 2018-12-18 | End: 2018-12-18

## 2018-12-18 RX ORDER — LIDOCAINE HYDROCHLORIDE 10 MG/ML
INJECTION, SOLUTION INFILTRATION; PERINEURAL AS NEEDED
Status: DISCONTINUED | OUTPATIENT
Start: 2018-12-18 | End: 2018-12-18 | Stop reason: SURG

## 2018-12-18 RX ORDER — CELECOXIB 200 MG/1
400 CAPSULE ORAL DAILY
Status: DISCONTINUED | OUTPATIENT
Start: 2018-12-19 | End: 2018-12-19 | Stop reason: HOSPADM

## 2018-12-18 RX ORDER — POLYETHYLENE GLYCOL 3350 17 G/17G
17 POWDER, FOR SOLUTION ORAL DAILY
Qty: 90 PACKET | Refills: 0 | COMMUNITY
Start: 2018-12-19 | End: 2019-01-29 | Stop reason: ALTCHOICE

## 2018-12-18 RX ORDER — FAMOTIDINE 10 MG/ML
20 INJECTION INTRAVENOUS EVERY 12 HOURS
Status: DISCONTINUED | OUTPATIENT
Start: 2018-12-18 | End: 2018-12-19 | Stop reason: HOSPADM

## 2018-12-18 RX ORDER — SODIUM CHLORIDE 9 MG/ML
INJECTION, SOLUTION INTRAVENOUS CONTINUOUS
Status: ACTIVE | OUTPATIENT
Start: 2018-12-18 | End: 2018-12-19

## 2018-12-18 RX ORDER — SODIUM CHLORIDE 9 MG/ML
INJECTION, SOLUTION INTRAVENOUS CONTINUOUS
Status: DISCONTINUED | OUTPATIENT
Start: 2018-12-18 | End: 2018-12-19 | Stop reason: HOSPADM

## 2018-12-18 RX ORDER — TRAMADOL HYDROCHLORIDE 50 MG/1
50 TABLET ORAL EVERY 6 HOURS PRN
Qty: 40 TABLET | Refills: 0 | Status: SHIPPED | OUTPATIENT
Start: 2018-12-18 | End: 2019-01-29 | Stop reason: ALTCHOICE

## 2018-12-18 RX ORDER — POLYETHYLENE GLYCOL 3350 17 G/17G
17 POWDER, FOR SOLUTION ORAL DAILY
Status: DISCONTINUED | OUTPATIENT
Start: 2018-12-18 | End: 2018-12-19 | Stop reason: HOSPADM

## 2018-12-18 RX ORDER — AMOXICILLIN 250 MG
1 CAPSULE ORAL 2 TIMES DAILY
Qty: 180 TABLET | Refills: 0 | COMMUNITY
Start: 2018-12-18 | End: 2019-01-29 | Stop reason: ALTCHOICE

## 2018-12-18 RX ORDER — ONDANSETRON HYDROCHLORIDE 2 MG/ML
4 INJECTION, SOLUTION INTRAVENOUS
Status: DISCONTINUED | OUTPATIENT
Start: 2018-12-18 | End: 2018-12-18 | Stop reason: HOSPADM

## 2018-12-18 RX ORDER — CEFAZOLIN SODIUM/WATER 2 G/20 ML
2 SYRINGE (ML) INTRAVENOUS ONCE
Status: COMPLETED | OUTPATIENT
Start: 2018-12-18 | End: 2018-12-18

## 2018-12-18 RX ORDER — OXYCODONE HYDROCHLORIDE 5 MG/1
5-10 TABLET ORAL EVERY 4 HOURS PRN
Status: DISCONTINUED | OUTPATIENT
Start: 2018-12-18 | End: 2018-12-19 | Stop reason: HOSPADM

## 2018-12-18 RX ORDER — DEXTROSE 40 %
15-30 GEL (GRAM) ORAL AS NEEDED
Status: DISCONTINUED | OUTPATIENT
Start: 2018-12-18 | End: 2018-12-19 | Stop reason: HOSPADM

## 2018-12-18 RX ORDER — TRAMADOL HYDROCHLORIDE 50 MG/1
50 TABLET ORAL 4 TIMES DAILY
Status: DISCONTINUED | OUTPATIENT
Start: 2018-12-18 | End: 2018-12-19 | Stop reason: HOSPADM

## 2018-12-18 RX ADMIN — PROPOFOL 50 MCG/KG/MIN: 10 INJECTION, EMULSION INTRAVENOUS at 08:56

## 2018-12-18 RX ADMIN — Medication 2 G: at 16:57

## 2018-12-18 RX ADMIN — ONDANSETRON 4 MG: 2 INJECTION INTRAMUSCULAR; INTRAVENOUS at 11:28

## 2018-12-18 RX ADMIN — MIDAZOLAM HYDROCHLORIDE 1 MG: 1 INJECTION, SOLUTION INTRAMUSCULAR; INTRAVENOUS at 08:42

## 2018-12-18 RX ADMIN — DEXAMETHASONE SODIUM PHOSPHATE 10 MG: 4 INJECTION, SOLUTION INTRA-ARTICULAR; INTRALESIONAL; INTRAMUSCULAR; INTRAVENOUS; SOFT TISSUE at 11:46

## 2018-12-18 RX ADMIN — ACETAMINOPHEN 650 MG: 325 TABLET ORAL at 15:09

## 2018-12-18 RX ADMIN — VANCOMYCIN HYDROCHLORIDE 1 G: 1 INJECTION, POWDER, LYOPHILIZED, FOR SOLUTION INTRAVENOUS at 21:48

## 2018-12-18 RX ADMIN — CELECOXIB 400 MG: 200 CAPSULE ORAL at 08:02

## 2018-12-18 RX ADMIN — SODIUM CHLORIDE: 9 INJECTION, SOLUTION INTRAVENOUS at 14:38

## 2018-12-18 RX ADMIN — TRANEXAMIC ACID 1100 MG: 100 INJECTION, SOLUTION INTRAVENOUS at 08:46

## 2018-12-18 RX ADMIN — LIDOCAINE HYDROCHLORIDE 5 ML: 10 INJECTION, SOLUTION INFILTRATION; PERINEURAL at 08:59

## 2018-12-18 RX ADMIN — FAMOTIDINE 20 MG: 20 TABLET ORAL at 08:02

## 2018-12-18 RX ADMIN — VANCOMYCIN HYDROCHLORIDE 1 G: 1 INJECTION, POWDER, LYOPHILIZED, FOR SOLUTION INTRAVENOUS at 08:24

## 2018-12-18 RX ADMIN — MIDAZOLAM HYDROCHLORIDE 2 MG: 1 INJECTION, SOLUTION INTRAMUSCULAR; INTRAVENOUS at 08:38

## 2018-12-18 RX ADMIN — ACETAMINOPHEN 650 MG: 325 TABLET ORAL at 21:38

## 2018-12-18 RX ADMIN — SENNOSIDES AND DOCUSATE SODIUM 1 TABLET: 8.6; 5 TABLET ORAL at 21:39

## 2018-12-18 RX ADMIN — BUPIVACAINE HYDROCHLORIDE IN DEXTROSE 1.8 ML: 7.5 INJECTION, SOLUTION SUBARACHNOID at 08:49

## 2018-12-18 RX ADMIN — HYDROMORPHONE HYDROCHLORIDE 0.25 MG: 1 INJECTION, SOLUTION INTRAMUSCULAR; INTRAVENOUS; SUBCUTANEOUS at 14:21

## 2018-12-18 RX ADMIN — OXYCODONE HYDROCHLORIDE 20 MG: 20 TABLET, FILM COATED, EXTENDED RELEASE ORAL at 08:02

## 2018-12-18 RX ADMIN — SODIUM CHLORIDE: 9 INJECTION, SOLUTION INTRAVENOUS at 08:29

## 2018-12-18 RX ADMIN — ASPIRIN 81 MG 81 MG: 81 TABLET ORAL at 21:39

## 2018-12-18 RX ADMIN — FAMOTIDINE 20 MG: 10 INJECTION INTRAVENOUS at 21:39

## 2018-12-18 RX ADMIN — Medication 2 G: at 23:52

## 2018-12-18 RX ADMIN — TRAMADOL HYDROCHLORIDE 50 MG: 50 TABLET, COATED ORAL at 21:38

## 2018-12-18 RX ADMIN — KETAMINE HYDROCHLORIDE 25 MG: 10 INJECTION, SOLUTION INTRAMUSCULAR; INTRAVENOUS at 08:56

## 2018-12-18 RX ADMIN — HYDROMORPHONE HYDROCHLORIDE 0.25 MG: 1 INJECTION, SOLUTION INTRAMUSCULAR; INTRAVENOUS; SUBCUTANEOUS at 21:47

## 2018-12-18 RX ADMIN — MIDAZOLAM HYDROCHLORIDE 1 MG: 1 INJECTION, SOLUTION INTRAMUSCULAR; INTRAVENOUS at 08:45

## 2018-12-18 RX ADMIN — TRAMADOL HYDROCHLORIDE 50 MG: 50 TABLET, COATED ORAL at 18:35

## 2018-12-18 RX ADMIN — PREGABALIN 150 MG: 150 CAPSULE ORAL at 08:03

## 2018-12-18 RX ADMIN — PREGABALIN 150 MG: 150 CAPSULE ORAL at 08:45

## 2018-12-18 RX ADMIN — ONDANSETRON 8 MG: 8 TABLET, ORALLY DISINTEGRATING ORAL at 08:02

## 2018-12-18 RX ADMIN — Medication 2 G: at 09:00

## 2018-12-18 RX ADMIN — TRAMADOL HYDROCHLORIDE 50 MG: 50 TABLET, COATED ORAL at 15:09

## 2018-12-18 ASSESSMENT — COGNITIVE AND FUNCTIONAL STATUS - GENERAL
WALKING IN HOSPITAL ROOM: 3 - A LITTLE
TOILETING: 4 - NONE
TOILETING: 3 - A LITTLE
EATING MEALS: 4 - NONE
HELP NEEDED FOR PERSONAL GROOMING: 3 - A LITTLE
DRESSING REGULAR LOWER BODY CLOTHING: 3 - A LITTLE
HELP NEEDED FOR PERSONAL GROOMING: 4 - NONE
STANDING UP FROM CHAIR USING ARMS: 4 - NONE
DRESSING REGULAR LOWER BODY CLOTHING: 4 - NONE
EATING MEALS: 4 - NONE
MOVING TO AND FROM BED TO CHAIR: 4 - NONE
CLIMB 3 TO 5 STEPS WITH RAILING: 3 - A LITTLE
DRESSING REGULAR UPPER BODY CLOTHING: 4 - NONE
DRESSING REGULAR UPPER BODY CLOTHING: 3 - A LITTLE
HELP NEEDED FOR BATHING: 3 - A LITTLE
HELP NEEDED FOR BATHING: 3 - A LITTLE

## 2018-12-18 ASSESSMENT — PAIN - FUNCTIONAL ASSESSMENT
PAIN_FUNCTIONAL_ASSESSMENT: NO/DENIES PAIN

## 2018-12-18 NOTE — ANESTHESIA POSTPROCEDURE EVALUATION
Patient: Sharon Morales    Procedure Summary     Date:  12/18/18 Room / Location:   OR 6 / [facility] OR    Anesthesia Start:  0840 Anesthesia Stop:  1100    Procedure:  KNEE TOTAL REVISION (Left ) Diagnosis:       Knee stiffness, left      S/P TKR (total knee replacement), left      (Left Knee Stiffness, S/P Total Knee)    Surgeon:  Feng Gross MD Responsible Provider:  Feng aM DO    Anesthesia Type:  spinal ASA Status:  2          Anesthesia Type: spinal  PACU Vitals     No data found.            Anesthesia Post Evaluation    Pain management: adequate  Patient location during evaluation: PACU  Patient participation: complete - patient participated  Level of consciousness: awake and alert  Cardiovascular status: acceptable  Airway Patency: adequate  Respiratory status: acceptable  Hydration status: acceptable  Anesthetic complications: no

## 2018-12-18 NOTE — PERIOPERATIVE NURSING NOTE
Pt. Did great in the Pacu. VSS. Spinal resolving nicely. Leaving at L4 and moving. Dressing CDI, ice is in place. Neuro checks are intact and WNL as documented. CPM 0-60, tolerated well. Denies discomfort. IV patent. Nausea was treated successfully.

## 2018-12-18 NOTE — DISCHARGE INSTRUCTIONS
KNEE REVISION      MEDICATION:    If you are taking Aspirin:  Enteric coated aspirin 2 times a day for blood clot prevention and take for 6 weeks.  May use over-the-counter Pepcid or Zantac if stomach upset occurs.    PAIN CONTROL:    You will be given a prescription for pain medication. Take your pain medicine as prescribed with food if possible. You can expect to have pain during the healing process from the incision and it will improve.     Use anti-inflammatories everyday (if prescribed) until you see your doctor. Opioid pain reliever is to be taken only as needed for pain.    DO NOT DRIVE WHILE TAKING PAIN MEDICATION.    Some patients find that they have some difficulty with constipation after surgery. This is due to a combination of things. Most of this is due to the pain medication you are taking. The pain medications, along with a decrease in activity, can sometimes lead to discomfort from constipation. You should eat plenty of fresh fruits, vegetables, drink fruit juices and drink plenty of water.    INCISION CARE:  Look at incision every day. Keep incision clean and dry.  Maintain Mepilex dressing for 5 days total.  Maintain RAYMOND stockings. May remove for showering and at night while sleeping.  If you have steri-strips, leave them on until they fall off.  Your staples or stitches will be removed about 18 to 24 days after surgery. Your incision will heal and the swelling and bruising will get better over the next 3 weeks.  If you have glue closing your incision, do NOT pull or pick off.  It will dissolve naturally.  You may shower but no tub baths, swimming, jacuzzi tubs, or any other activity that would require submersion of your incision in water.      Call your doctor if you notice any of the following:  Fever over 101.5 degrees  Drainage from incision  Increased swelling around incision  Increased redness around incision line  Thigh/calf pain or swelling in legs  Chest pain  Chest congestion  Problems  with breathing    ACTIVITY:  You may progress to a cane when ready.  You may put as much weight as you can tolerate on your operative leg (unless instructed otherwise).  Balance rest and activity.    DO NOT SMOKE! Smoking is not healthy for your healing process.  No driving for at least 3 weeks. You must also be off of prescription opioids.    KNEE PRECAUTIONS:  No pillow under the knee.  No twisting or kneeling.    FOLLOW Up:  Call now for an appointment in 3 weeks from your date of surgery with Dr. Gross. [phone]        *FOR ANY ORTHOPEDIC ISSUES OR CONCERNS THAT NEED TO BE ADDRESSED IN PERSON, YOU MAY REPORT TO THE URGENT CARE LOCATED AT THE [facility] WHICH HAS EVENING AND WEEKEND HOURS, INSTEAD OF REPORTING TO THE EMERGENCY ROOM

## 2018-12-18 NOTE — BRIEF OP NOTE
KNEE TOTAL REVISION (L) Procedure Note    Procedure:    KNEE TOTAL REVISION  CPT(R) Code:  12133 - MN REVISE KNEE JOINT REPLACE,ALL PARTS      Pre-op Diagnosis     * Knee stiffness, left [M25.662]     * S/P TKR (total knee replacement), left [Z96.652]       Post-op Diagnosis     * Knee stiffness, left [M25.662]     * S/P TKR (total knee replacement), left [Z96.652]    Surgeon(s) and Role:     * Feng Gross MD - Primary    Anesthesia: Choice    Staff:   Circulator: Izzy Cummings, JOSE MANUEL; Dimple Martinez RN  Scrub Person: Cathy Nowak RN  Registered Nurse First Assistant: Steff Troncoso RN    Procedure Details   Revision left TKR    Estimated Blood Loss: No blood loss documented.    Specimens:                  Order Name Source Comment Collection Info Order Time   BACTERIAL CULTURE / SMEAR, AEROBIC AND ANAEROBIC Knee, Left   Pre-op diagnosis:Left Knee Stiffness, S/P Total Knee Collected By: Feng Gross MD 12/18/2018 10:21 AM   FUNGAL CULTURE / SMEAR Knee, Left   Pre-op diagnosis:Left Knee Stiffness, S/P Total Knee Collected By: Feng Gross MD 12/18/2018 10:21 AM   TISSUE CULTURE / SMEAR Knee, Left   Pre-op diagnosis:Left Knee Stiffness, S/P Total Knee Collected By: Feng Gross MD 12/18/2018 10:21 AM   AFB CULTURE / SMEAR Knee, Left   Pre-op diagnosis:Left Knee Stiffness, S/P Total Knee Collected By: Feng Gross MD 12/18/2018 10:21 AM   TYPE AND SCREEN Blood, Venous  Collected By: Nola Lugo RN 12/18/2018  8:32 AM         Drains:      Implants:   Implant Name Type Inv. Item Serial No.  Lot No. LRB No. Used   CEMENT BONE SIMPLEX W/TOBRAMYCIN - [id number]  CEMENT BONE SIMPLEX W/TOBRAMYCIN  [employer] [id number] Left 3   Prep IM Enhanced total hip preparation kit    SMITH  [id number] Left 1   CEMENTED STEM [id number] - [id number]  CEMENTED STEM [id number]  SMITH  [id number] Left 1   CEMENTED STEM STRAIGHT [id number] - [id number]  CEMENTED STEM  STRAIGHT [id number]  VERMA  [id number] Left 1   INSERT ARTICULAR CONSTRAINED SIZE 1-2 - [id number]  INSERT ARTICULAR CONSTRAINED SIZE 1-2  VERMA  [id number] Left 1   Legion Cocr Constrained Femoral Component    VERMA  [id number] Left 1   Size 1 left legion revision tibial baseplate       VERMA  [id number] Left 1              Complications:  None; patient tolerated the procedure well.           Disposition: PACU - hemodynamically stable.           Condition: stable    Feng Gross MD  Phone Number: [phone]

## 2018-12-18 NOTE — OP NOTE
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 Next the left  Knee was prepped and draped in usual sterile fashion. Next the limb was exsanguinated with an Esmarch bandage and the tourniquet was inflated to 350 mmHg. An anterior approach using the previous incision was carried out through the subcutaneous tissue down to the level of the fascia. The fascia was then divided in a standard median parapatellar patella arthrotomy. Next the patella was inspected and found to be in excellent condition and not revised.  At this point in time scar surrounding the joint was removed and a complete synovectomy was performed.  At this point in time, using chisels osteotomes and other tools, the femoral and tibial components were removed with minimal bone loss.  Canals were reamed to 14 on the femoral side and 12 on the tibial side.  At this point in time, the saw was used to do debridements of the distal aspect of the femur and proximal part of the tibia in standard manner to freshen up the bone and normalize the angle of the cuts.  A trial reduction was now performed with the size 2 femur, with a 14 x 120 mm stem, the size 1 tibia, with a 12 x 120 mm stem, and a size 13 mm polyethylene insert.  No augments were used.  These components were found to be quite satisfactory in terms of range of motion and stability and patellar tracking.  These size components were accepted and this point in time cement was mixed and infiltrated into the canals and the ends of the bones.  Previously mentioned sized components for the Legion knee replacement system were cemented into place.   Excess cement was removed from all areas and the knee was then evaluated for range of motion stability  kinematics balance and patella tracking, all of which were satisfactory. At this point time dilute Betadine solutions placed into the wound for approximately 2 minutes was thoroughly irrigated and evacuated and then the wound was closed with #2 Quill the deep layer O- Quill in the subcutaneous layer and 3-0 vicryl into the skin.  Finally prior to the completion of closure, dilute Marcaine solution was infiltrated into the ligia-incisional tissues for postoperative pain control. A sterile compressive dressing was then placed and the tourniquet was then released the patient was then transferred to the recovery room in stable condition. I was present for the entire case.

## 2018-12-18 NOTE — PROGRESS NOTES
Patient: Sharon Morales  Location: [place] [id number]  MRN: [id number]  Today's date: 12/18/2018  Pt in bedside chair, alarmed engaged, call bell in reach.  Patient Active Problem List   Diagnosis   • Hypothyroidism   • Knee stiffness, left   • At risk for venous thromboembolism (VTE)   • Pain management   • Constipation      Past Medical History:   Diagnosis Date   • Arthritis     Knees -sp B/L TKR   • At risk for venous thromboembolism (VTE) 12/18/2018   • Constipation 12/18/2018   • Hypothyroidism    • Last menstrual period (LMP) > 10 days ago 10/10/2018   • Motion sickness    • Pain management 12/18/2018   • PONV (postoperative nausea and vomiting)    • Seasonal allergies    • Snores              Therapy Pain/Vitals     Row Name 12/18/18 1509             Pain/Comfort/Sleep    Pain Charting Type Pain Assessment      Presence of Pain complains of pain/discomfort      Preferred Pain Scale number (Numeric Rating Pain Scale)      Pain Body Location - Side Left      Pain Body Location - Orientation lower      Pain Body Location knee      Pain Rating (0-10): Rest 5      Pain Rating (0-10): Activity 5         Vital Signs    Pulse 71      /73            Prior Living Environment  Lives With: spouse, child(erich), dependent (4 kids 2 dogs)  Living Arrangements: house  Living Environment Comment: 1 SIVAN, FF to bed/bath, powder room Equipment Currently Used at Home: walker, rolling, cane, straight       Prior Level of Function  Ambulation: independent  Transferring: independent  Toileting: independent  Bathing: independent  Dressing: independent  Eating: independent  Communication: understands/communicates without difficulty  Swallowing: swallows foods/liquids without difficulty  Equipment Currently Used at Home: walker, rolling, cane, straight           PT Evaluation - 12/18/18 6543        Session Details    Document Type initial evaluation    Mode of Treatment physical therapy       Time Calculation    Start Time 9670     Stop Time 1533    Time Calculation (min) 24 min       General Information    Patient Profile Reviewed? yes    Onset of Illness/Injury or Date of Surgery 12/18/18    Referring Physician SYLVIA    Pertinent History of Current Functional Problem admit for L TK revision    Existing Precautions/Restrictions fall       Coping Strategies    Trust Relationship/Rapport care explained       Orientation Log    [facility] 3-->spontaneous/free recall    Kind of Place 3-->spontaneous/free recall    Name of [facility] 3-->spontaneous/free recall    Month 3-->spontaneous/free recall    Date 3-->spontaneous/free recall    Year 3-->spontaneous/free recall    Day of Week 3-->spontaneous/free recall    Clock Time 3-->spontaneous/free recall    Etiology/Event 3-->spontaneous/free recall    Pathology Deficits 3-->spontaneous/free recall    Total Score 30       Cognition/Psychosocial    Safety Awareness intact       Attention    Behavioral Observations WNL, no concerns       Manual Muscle Testing (MMT)    General MMT Assessment --   R LE WFL, L grossly 3/5 t/o DF 5/5       Bed Mobility    [place], Roll Left independent    [place], Supine to Sit independent       Bed to Chair Transfer    [place], Bed to Chair modified independence    Assistive Device walker, front-wheeled       Sit to Stand Transfer    [place], Sit to Stand Transfer modified independence    Assistive Device walker, front-wheeled       Stand to Sit Transfer    [place], Stand to Sit Transfer modified independence    Assistive Device walker, front-wheeled       Gait Training    [place], Gait supervision    Assistive Device walker, front-wheeled    Distance in Feet 77 feet    Gait Pattern Utilized step-through    Gait Deviations Identified --   varied razia no LOB noted       AM-PAC (TM) - Mobility (Current Function)    Turning from your back to your side while in a flat bed without using bedrails? 4 - None   Simultaneous filing. User may not have  seen previous data.    Moving from lying on your back to sitting on the side of a flat bed without using bedrails? 4 - None   Simultaneous filing. User may not have seen previous data.    Moving to and from a bed to a chair? 4 - None   Simultaneous filing. User may not have seen previous data.    Standing up from a chair using your arms? 4 - None   Simultaneous filing. User may not have seen previous data.    AM-PAC (TM) Mobility Score 22   Simultaneous filing. User may not have seen previous data.       PT Clinical Impression    Patient's Goals For Discharge return home;take care of myself at home;return to all previous roles/activities    Plan For Care Reviewed: Physical Therapy PT plan for care discussed with patient;patient voices agreement with PT plan for care    Impairments Found (PT Eval) gait, locomotion, and balance    Rehab Potential/Prognosis good, to achieve stated therapy goals    PT Frequency of Treatment 5-7 times per week    Anticipated Equipment Needs at Discharge none    Expected Discharge Disposition home    Daily Outcome Statement Pt admit for elective L TK rev.  Pt scored 22 on AMPAC indicating return home s/p hospitalization.  Pt is Mod I for transfers and S for amb with RW.  Next session assess steps, and car tx ad clear pt for DC home.          Pain Assessment/Intervention  Pain Charting Type: Pain Assessment             Education provided this session. See the Patient Education summary report for full details.    PT Care Plan Goals      Most Recent Value   Stair Goal, PT   PT STG: Stairs  modified independence   PT STG: Number of Stairs  12      PT Care Plan Goals      Most Recent Value   Gait Goal   Date Goal Established: Gait Training  12/11/18   Time to Achieve Goal: Gait Training  by discharge   Level of [place]  modified independence   Assistive Device: Gait Training  walker, rolling   Distance Goal: Gait Training (feet)  200 feet   Transfer Goal   Date Goal Established: Transfer  Training  12/18/18   Time to Achieve Goal: Transfer Training  by discharge   Goal Activity: Transfer Training  all transfers   Level of [place] Goal: Transfer Training  independent

## 2018-12-18 NOTE — ASSESSMENT & PLAN NOTE
S/P left revision Total knee replacement, IS 10x's/hr/day, IV antibx per ortho protocol, BMP and CBC reviewed.

## 2018-12-18 NOTE — PLAN OF CARE
Problem: Patient Care Overview  Goal: Plan of Care Review  Outcome: Ongoing (interventions implemented as appropriate)   12/18/18 1523   Coping/Psychosocial   Plan Of Care Reviewed With patient   Plan of Care Review   Progress progress toward functional goals as expected   Outcome Summary OT eval complete, MOD IND ADL's and functional transfers no further acute OT needs     Goal: Individualization & Mutuality  Outcome: Ongoing (interventions implemented as appropriate)

## 2018-12-18 NOTE — ANESTHESIA PREPROCEDURE EVALUATION
Anesthesia ROS/MED HX    Anesthesia History    History of anesthetic complications  Neuro/Psych - neg  Cardiovascular- neg  GI/Hepatic- neg  Endo/Other   Hypothyroidism      Past Surgical History:   Procedure Laterality Date   • BUNIONECTOMY Bilateral    •  SECTION      x 3   • JOINT REPLACEMENT Bilateral    • REVISION TOTAL KNEE ARTHROPLASTY Right 2018     Wt Readings from Last 3 Encounters:   18 54.4 kg (120 lb)   18 52.6 kg (116 lb)   10/10/18 54.4 kg (120 lb)       Temp Readings from Last 3 Encounters:   18 36.9 °C (98.5 °F) (Temporal)   18 36.7 °C (98 °F) (Oral)   10/10/18 36.5 °C (97.7 °F) (Oral)       BP Readings from Last 3 Encounters:   18 129/82   18 120/68   10/10/18 122/86       Pulse Readings from Last 3 Encounters:   18 82   18 72   10/10/18 66       Lab Results   Component Value Date    WBC 3.02 (L) 2018    HGB 12.4 2018    HCT 39.5 2018    MCV 87.2 2018     2018       Lab Results   Component Value Date    GLUCOSE 81 2018    CALCIUM 9.3 2018     2018    K 4.6 2018    CO2 26 2018     2018    BUN 15 2018    CREATININE 0.6 2018       Lab Results   Component Value Date    HCGPREGUR Negative 2018             Current Facility-Administered Medications   Medication Dose Route Frequency Provider Last Rate Last Dose   • ceFAZolin in sterile water (ANCEF) injection 2 g  2 g intravenous Once Feng Gross MD       • sodium chloride 0.9 % infusion   intravenous Continuous Feng Gross MD       • tranexamic acid 1,100 mg in sodium chloride 0.9 % 50 mL IVPB  20 mg/kg intravenous Once Feng Gross MD       • vancomycin IVPB 1 g/250 mL NSS  1 g intravenous Once Feng Gross MD           Prior to Admission medications    Medication Sig Start Date End Date Taking? Authorizing Provider   levothyroxine (SYNTHROID) 112 mcg tablet TAKE 1  TABLET BY MOUTH EVERY DAY 18   Doris Feliciano CRNP   olopatadine (PATANOL) 0.1 % ophthalmic solution 0.1 % daily as needed.   18   Provider, MD Live       Patient Active Problem List   Diagnosis   • Acquired hypothyroidism       Past Medical History:   Diagnosis Date   • Arthritis     Knees -sp B/L TKR   • Hypothyroidism    • Last menstrual period (LMP) > 10 days ago 10/10/2018   • PONV (postoperative nausea and vomiting)    • Seasonal allergies    • Snores        Past Surgical History:   Procedure Laterality Date   • BUNIONECTOMY Bilateral    •  SECTION      x 3   • JOINT REPLACEMENT Bilateral    • REVISION TOTAL KNEE ARTHROPLASTY Right 2018           Physical Exam    Airway   Mallampati: II   TM distance: >3 FB   Neck ROM: full  Cardiovascular - normal   Rhythm: regular   Rate: normal  Pulmonary - normal   clear to auscultation  Dental - normal        Anesthesia Plan    Plan: spinal   ASA 2  Blood Products:   Use of Blood Products Discussed: No   Anesthetic plan and risks discussed with: patient

## 2018-12-18 NOTE — PROGRESS NOTES
Patient: Sharon Morales  Location: [facility]  MRN: [id number]  Today's date: 12/18/2018     Seated in chair on draw sheet, incontinence pad, intact chair pad alarm on,  call bell and all needs in reach, RN aware. Hand off to PT   Patient Active Problem List   Diagnosis   • Hypothyroidism   • Knee stiffness, left   • At risk for venous thromboembolism (VTE)   • Pain management   • Constipation        Past Medical History:   Diagnosis Date   • Arthritis     Knees -sp B/L TKR   • At risk for venous thromboembolism (VTE) 12/18/2018   • Constipation 12/18/2018   • Hypothyroidism    • Last menstrual period (LMP) > 10 days ago 10/10/2018   • Motion sickness    • Pain management 12/18/2018   • PONV (postoperative nausea and vomiting)    • Seasonal allergies    • Snores              Therapy Pain/Vitals     Row Name 12/18/18 3509          Pain/Comfort/Sleep    Pain Charting Type Pain Assessment     Presence of Pain complains of pain/discomfort     Preferred Pain Scale number (Numeric Rating Pain Scale)     Pain Body Location - Side Left     Pain Body Location - Orientation lower     Pain Body Location knee     Pain Rating (0-10): Rest 5     Pain Rating (0-10): Activity 5      Pain intervention position change         Vital Signs    Pulse 71     /73           Prior Living Environment  Lives With: spouse, child(erich), dependent (4 kids 2 dogs)  Living Arrangements: house  Living Environment Comment: 1 SIVAN, FF to bed/bath, powder room Equipment Currently Used at Home: walker, rolling, cane, straight       Prior Level of Function  Ambulation: independent  Transferring: independent  Toileting: independent  Bathing: independent  Dressing: independent  Eating: independent  Communication: understands/communicates without difficulty  Swallowing: swallows foods/liquids without difficulty  Equipment Currently Used at Home: walker, rolling, cane, straight           OT Evaluation - 12/18/18 6515        Session Details     Document Type initial evaluation    Mode of Treatment occupational therapy    Patient/Family Observations resting in bed       Time Calculation    Start Time 1500    Stop Time 1525    Time Calculation (min) 25 min       General Information    Patient Profile Reviewed? yes    Referring Physician víctor    Pertinent History of Current Functional Problem L  knee revision     Existing Precautions/Restrictions fall;weight bearing       Weight Bearing Status    Left LE Weight Bearing Status weight bearing as tolerated       Coping Strategies    Counseling (Coping Strategies) self-care encouraged       Orientation Log    [facility] 3-->spontaneous/free recall    Kind of Place 3-->spontaneous/free recall    Name of [facility] 3-->spontaneous/free recall    Month 3-->spontaneous/free recall    Date 3-->spontaneous/free recall    Year 3-->spontaneous/free recall    Day of Week 3-->spontaneous/free recall       Cognition/Psychosocial    Safety Awareness intact       Attention    Behavioral Observations WFL       Sensory    Sensory General Assessment no sensation deficits identified       Range of Motion (ROM)    General Range of Motion no range of motion deficits identified       Manual Muscle Testing (MMT)    General MMT Assessment no strength deficits identified       Bed Mobility    [place], Supine to Sit modified independence       Sit to Stand Transfer    [place], Sit to Stand Transfer modified independence    Assistive Device walker, front-wheeled       Stand to Sit Transfer    [place], Stand to Sit Transfer modified independence    Assistive Device walker, front-wheeled       Toilet Transfer    [place], Toilet Transfer modified independence    Comment functional mobility in room/bathroom MOD IND RW        Upper Body Dressing    Upper Body Dressing Tasks don;doff;hospital gown    Upper Body Dressing Position supported sitting    Upper Body Dressing [place] independent       Lower Body Dressing    Lower  Body Dressing Tasks doff;don;socks    Lower Body Dressing Position supported sitting    Lower Body Dressing [place] modified independence       Bathing    Self-Performance left upper leg;right upper leg;buttocks;front perineal area    Bathing [place] set up;modified independence       Toileting    Toileting Position supported sitting    Toileting [place] toileting skills;adjust/manage clothing;perform bladder hygiene;independent    Comment slight incontinence with initial stand       Grooming    Self-Performance washes, rinses and dries hands    Grooming Position unsupported standing    Grooming [place] modified independence    Comment in stance at sink RW        AM-PAC (TM) - ADL (Current Function)    Putting on and taking off regular lower body clothing? 4 - None    Bathing? 3 - A Little    Toileting? 4 - None    Putting on/taking off regular upper body clothing? 4 - None    How much help for taking care of personal grooming? 4 - None    Eating meals? 4 - None    AM-PAC (TM) ADL Score 23       OT Clinical Impression    Patient's Goals For Discharge return home    Plan For Care Reviewed: Occupational Therapy OT plan for care discussed with patient    Impairments Found (OT Eval) other (see comments)   none    Functional Limitations in Following Categories other (see comments)   none    Rehab Potential/Prognosis: Occupational Therapy good, to achieve stated therapy goals    OT Frequency of Treatment other (see comments)   none    Problem List: Occupational Therapy pain    Activity Limitations Related to Problem List other (see comments)   none    Anticipated Equipment Needs at Discharge none    Expected Discharge Disposition home with assist   supportive family    Daily Outcome Statement MOD IND/IND with functional transfers and ADL's, [facility] 23, supportive family, no further acute OT needs                        Education provided this session. See the Patient Education summary report for full  details.

## 2018-12-18 NOTE — ANESTHESIA PROCEDURE NOTES
Spinal Block    Patient location during procedure: OR  Start time: 12/18/2018 8:45 AM  End time: 12/18/2018 8:49 AM  Staffing  Anesthesiologist: MUNDO TINAJERO  Performed: anesthesiologist   Reason for block: at surgeon's request  Preanesthetic Checklist  Completed: patient identified, surgical consent, pre-op evaluation, timeout performed, IV checked, risks and benefits discussed, monitors and equipment checked and sterile field maintained during procedure  Spinal Block  Patient position: sitting  Prep: ChloraPrep and site prepped and draped  Patient monitoring: heart rate, cardiac monitor, continuous pulse ox and blood pressure  Approach: midline  Location: L3-4  Injection technique: single-shot  Needle  Needle type: Sprotte   Needle gauge: 24 G  Needle length: 3.5 in  Assessment  Events: cerebrospinal fluid  Additional Notes  Procedure well tolerated. Vital signs stable.

## 2018-12-18 NOTE — CONSULTS
Consult Note      Medical Management    HPI:  Sharon Morales is a 48 y.o. female who was admitted today 12/18/18 for Knee stiffness, left [M25.662]  S/P TKR (total knee replacement), left [Z96.652]  Knee stiffness, left [M25.662] by Feng Gross MD who had a left revision Total knee replacement at [facility].  Chronic history of leukopenia.  States she donates blood every 3 months.  PCP is Dr. Natalia Wilson. Currently pt. denies Fever/Chills, N/V/D, SOB, Chest pain/Pressure, Palpitations, Cough/Wheezing, Abd Pain, Back Pain, Rash/Itch, Headache, Dizziness/Lightheadedness.  Patient looks good sitting up in bed. Patient is eating/drinking and urinating well.    Subjective   Onset prior to arrival-month(s)  Onset Rate-are gradual in onset  Progression-worsened  Pain-Knee: left aching, dull and throbbing  Risk factors-OA     Tolerating diet: yes    Allegies: Avocado    Home med reconciliation/list reviewed and noted below:    •  levothyroxine, TAKE 1 TABLET BY MOUTH EVERY DAY    Current med orders reviewed and noted below:    Current Facility-Administered Medications   Medication Dose Route Frequency Provider Last Rate Last Dose   • acetaminophen (TYLENOL) tablet 650 mg  650 mg oral TID Feng Gross MD   650 mg at 12/18/18 1509   • alum-mag hydroxide-simeth (MAALOX) 200-200-20 mg/5 mL suspension 30 mL  30 mL oral q4h PRN Feng Gross MD       • aspirin chewable tablet 81 mg  81 mg oral BID Feng Gross MD       • ceFAZolin (ANCEF) NSS IVPB piggyback 2 g  2 g intravenous q8h INT Feng Gross MD   2 g at 12/18/18 1657   • [START ON 12/19/2018] celecoxib (celeBREX) capsule 400 mg  400 mg oral Daily Feng Gross MD       • dexamethasone (DECADRON) injection 10 mg  10 mg intravenous Daily Feng Gross MD   10 mg at 12/18/18 1146   • glucose chewable tablet 16-32 g of dextrose  16-32 g of dextrose oral PRN Feng Gross MD        Or   • dextrose 40 % oral gel 15-30 g of  dextrose  15-30 g of dextrose oral PRN Feng Gross MD        Or   • glucagon (GLUCAGEN) injection 1 mg  1 mg intramuscular PRN Feng Gross MD        Or   • dextrose in water injection 12.5 g  25 mL intravenous PRN Feng Gross MD       • famotidine (PEPCID) injection 20 mg  20 mg intravenous q12h BENITEZ Feng Gross MD       • oxyCODONE (ROXICODONE) immediate release tablet 5-10 mg  5-10 mg oral q4h PRN Feng Gross MD        Or   • HYDROmorphone (DILAUDID) 1 mg/mL injection 0.25-0.5 mg  0.25-0.5 mg intravenous q3h PRN Feng Gross MD   0.25 mg at 12/18/18 1421   • [START ON 12/19/2018] levothyroxine (SYNTHROID) tablet 112 mcg  112 mcg oral Daily (6a) Desiree Seymour CRNP       • ondansetron (ZOFRAN) injection 4 mg  4 mg intravenous q8h PRN Feng Gross MD       • polyethylene glycol (MIRALAX) 17 gram packet 17 g  17 g oral Daily Feng Gross MD       • sennosides-docusate sodium (SENOKOT-S) 8.6-50 mg per tablet 1 tablet  1 tablet oral BID Feng Gross MD       • sodium chloride 0.9 % infusion   intravenous Continuous Feng Gross MD 40 mL/hr at 12/18/18 0829     • sodium chloride 0.9 % infusion   intravenous Continuous Feng Gross  mL/hr at 12/18/18 1438     • traMADol (ULTRAM) tablet 50 mg  50 mg oral QID Feng Gross MD   50 mg at 12/18/18 1509   • vancomycin IVPB 1 g/250 mL NSS  1 g intravenous q12h INT Feng Gross MD           Medical History:   Past Medical History:   Diagnosis Date   • Arthritis     Knees -sp B/L TKR   • At risk for venous thromboembolism (VTE) 12/18/2018   • Constipation 12/18/2018   • Hypothyroidism    • Last menstrual period (LMP) > 10 days ago 10/10/2018   • Leukopenia 12/18/2018   • Motion sickness    • Pain management 12/18/2018   • PONV (postoperative nausea and vomiting)    • Seasonal allergies    • Snores      Other:    Surgical History:   Past Surgical History:   Procedure Laterality  Date   • BUNIONECTOMY Bilateral    •  SECTION      x 3   • JOINT REPLACEMENT Bilateral    • REVISION TOTAL KNEE ARTHROPLASTY Right 2018     Other    Social History:   Social History     Social History   • Marital status:      Spouse name: N/A   • Number of children: N/A   • Years of education: N/A     Social History Main Topics   • Smoking status: Never Smoker   • Smokeless tobacco: Never Used   • Alcohol use Yes      Comment: occas   • Drug use: No   • Sexual activity: Defer     Other Topics Concern   • None     Social History Narrative   • None       Family History:   Family History   Problem Relation Age of Onset   • Stomach cancer Mother    • Hypertension Mother    • Skin cancer Father          Review of Systems   Constitutional: Negative for chills, fatigue and fever.   HENT: Negative for ear pain, postnasal drip and sore throat.    Eyes: Negative for visual disturbance.   Respiratory: Negative for cough, chest tightness, shortness of breath and wheezing.    Cardiovascular: Negative for chest pain and palpitations.   Gastrointestinal: Negative for abdominal distention, abdominal pain, constipation, n/v/d   Endocrine: Negative for polyuria.   Genitourinary: Negative for difficulty urinating and frequency.   Musculoskeletal: Negative for back pain and myalgias.   Skin: Negative for rash.   Neurological: Negative for dizziness and headaches.   Hematological: Does not bruise/bleed easily.   Psychiatric/Behavioral: Negative for agitation and confusion.     Vital signs in last 24 hours:  Temp:  [36.6 °C (97.9 °F)-37 °C (98.6 °F)] 36.7 °C (98 °F)  Heart Rate:  [56-84] 82  Resp:  [13-18] 16  BP: ()/(52-82) 113/67    Objective     Physical Exam   Constitutional: WD/WN. No distress.    Head: Normocephalic and atraumatic.   Eyes: Conjunctivae and EOMI. PERRLA.   Neck: Normal range of motion. Neck supple.   Cardiovascular: Normal RRR, normal heart sounds and intact distal pulses.    Pulmonary/Chest:   No resp distress, BS CTA B/L. No Rales/Rhonchi/Wheezing.   Abdominal: Soft. NT. Bowel sounds are normal. No distension.   Genitourinary: No suprapubic tenderness or fullness  Musculoskeletal: left knee drsg D/I  Ext: No Cyanosis, Clubbing, Edema  Neurological: AAO x 3.   Skin: Skin is warm and dry. No rash noted.    Psychiatric:  Normal mood/affect. Behavior is normal. Judgment/Thought content nl        Labs  Lab Results   Component Value Date    WBC 3.02 (L) 11/30/2018    HGB 12.4 11/30/2018    HCT 39.5 11/30/2018    MCV 87.2 11/30/2018     11/30/2018     Lab Results   Component Value Date    GLUCOSE 81 11/30/2018    CALCIUM 9.3 11/30/2018     11/30/2018    K 4.6 11/30/2018    CO2 26 11/30/2018     11/30/2018    BUN 15 11/30/2018    CREATININE 0.6 11/30/2018     Lab Results   Component Value Date    ALT 19 11/30/2018    AST 30 11/30/2018    ALKPHOS 69 11/30/2018    BILITOT 0.6 11/30/2018       Assessment   48 y.o. female being consulted for management recommendations      Plan  Knee stiffness, left  S/P left revision Total knee replacement, IS 10x's/hr/day, IV antibx per ortho protocol, BMP and CBC in am    At risk for venous thromboembolism (VTE)  ASA 81mg bid    Hypothyroidism  Resume Levothyroxine 112mcg daily    Pain management  Decadron, Tylenol, Tramadol, and Oxycodone and Dilaudid prn    Constipation  Miralax and Senokot-S daily    Leukopenia  Chronic history of leukopenia.  States she donates blood every 3 months.   Check CBC in am

## 2018-12-18 NOTE — OR SURGEON
Pre-Procedure patient identification:  I am the primary operating surgeon/proceduralist and I have identified the patient on 12/18/18 at 7:44 AM Feng Gross MD  Phone Number: [phone]

## 2018-12-18 NOTE — PLAN OF CARE
Problem: Patient Care Overview  Goal: Plan of Care Review  Outcome: Ongoing (interventions implemented as appropriate)   12/18/18 5663   Coping/Psychosocial   Plan Of Care Reviewed With patient   Plan of Care Review   Progress progress toward functional goals as expected   Outcome Summary PT EVAL pt I for bed mobility, Mod I for transfers with RW     Goal: Individualization & Mutuality  Outcome: Ongoing (interventions implemented as appropriate)      Problem: Acute Therapy Services Goal & Intervention Plan  Goal: Gait Training Goal  Outcome: Ongoing (interventions implemented as appropriate)    Goal: Stairs Goal  Outcome: Ongoing (interventions implemented as appropriate)    Goal: Transfer Training Goal  Outcome: Outcome(s) Achieved Date Met: 12/18/18

## 2018-12-19 VITALS
HEART RATE: 74 BPM | DIASTOLIC BLOOD PRESSURE: 57 MMHG | BODY MASS INDEX: 25.19 KG/M2 | WEIGHT: 120 LBS | RESPIRATION RATE: 18 BRPM | TEMPERATURE: 98.2 F | HEIGHT: 58 IN | SYSTOLIC BLOOD PRESSURE: 118 MMHG | OXYGEN SATURATION: 100 %

## 2018-12-19 PROBLEM — D62 ACUTE BLOOD LOSS ANEMIA: Status: ACTIVE | Noted: 2018-12-19

## 2018-12-19 LAB
ANION GAP SERPL CALC-SCNC: 6 MEQ/L (ref 3–15)
BUN SERPL-MCNC: 10 MG/DL (ref 8–20)
CALCIUM SERPL-MCNC: 8.2 MG/DL (ref 8.9–10.3)
CHLORIDE SERPL-SCNC: 109 MEQ/L (ref 98–109)
CO2 SERPL-SCNC: 21 MEQ/L (ref 22–32)
CREAT SERPL-MCNC: 0.5 MG/DL
ERYTHROCYTE [DISTWIDTH] IN BLOOD BY AUTOMATED COUNT: 15.1 % (ref 11.7–14.4)
GFR SERPL CREATININE-BSD FRML MDRD: >60 ML/MIN/1.73M*2
GLUCOSE SERPL-MCNC: 88 MG/DL (ref 70–99)
HCT VFR BLDCO AUTO: 28.2 %
HGB BLD-MCNC: 9 G/DL
MCH RBC QN AUTO: 27.9 PG (ref 28–33.2)
MCHC RBC AUTO-ENTMCNC: 31.9 G/DL (ref 32.2–35.5)
MCV RBC AUTO: 87.3 FL (ref 83–98)
PDW BLD AUTO: 10.3 FL (ref 9.4–12.3)
PLATELET # BLD AUTO: 214 K/UL
POTASSIUM SERPL-SCNC: 4.3 MEQ/L (ref 3.6–5.1)
RBC # BLD AUTO: 3.23 M/UL (ref 3.93–5.22)
RHODAMINE-AURAMINE STN SPEC: NORMAL
SODIUM SERPL-SCNC: 136 MEQ/L (ref 136–144)
WBC # BLD AUTO: 6.8 K/UL

## 2018-12-19 PROCEDURE — 63700000 HC SELF-ADMINISTRABLE DRUG: Performed by: NURSE PRACTITIONER

## 2018-12-19 PROCEDURE — 25000000 HC PHARMACY GENERAL: Performed by: ORTHOPAEDIC SURGERY

## 2018-12-19 PROCEDURE — 85027 COMPLETE CBC AUTOMATED: CPT | Performed by: ORTHOPAEDIC SURGERY

## 2018-12-19 PROCEDURE — 63600000 HC DRUGS/DETAIL CODE: Performed by: NURSE PRACTITIONER

## 2018-12-19 PROCEDURE — 36415 COLL VENOUS BLD VENIPUNCTURE: CPT | Performed by: ORTHOPAEDIC SURGERY

## 2018-12-19 PROCEDURE — 80048 BASIC METABOLIC PNL TOTAL CA: CPT | Performed by: ORTHOPAEDIC SURGERY

## 2018-12-19 PROCEDURE — 63600000 HC DRUGS/DETAIL CODE: Performed by: ORTHOPAEDIC SURGERY

## 2018-12-19 PROCEDURE — 63700000 HC SELF-ADMINISTRABLE DRUG: Performed by: ORTHOPAEDIC SURGERY

## 2018-12-19 PROCEDURE — 97116 GAIT TRAINING THERAPY: CPT | Mod: GP

## 2018-12-19 RX ORDER — OXYCODONE HYDROCHLORIDE 5 MG/1
5 TABLET ORAL ONCE
Status: COMPLETED | OUTPATIENT
Start: 2018-12-19 | End: 2018-12-19

## 2018-12-19 RX ORDER — METHYLPREDNISOLONE 4 MG/1
4 TABLET ORAL SEE ADMIN INSTRUCTIONS
Qty: 21 TABLET | Refills: 0 | Status: SHIPPED | OUTPATIENT
Start: 2018-12-19 | End: 2019-02-14 | Stop reason: ENTERED-IN-ERROR

## 2018-12-19 RX ORDER — KETOROLAC TROMETHAMINE 15 MG/ML
15 INJECTION, SOLUTION INTRAMUSCULAR; INTRAVENOUS ONCE
Status: COMPLETED | OUTPATIENT
Start: 2018-12-19 | End: 2018-12-19

## 2018-12-19 RX ADMIN — OXYCODONE HYDROCHLORIDE 5 MG: 5 TABLET ORAL at 06:17

## 2018-12-19 RX ADMIN — ASPIRIN 81 MG 81 MG: 81 TABLET ORAL at 09:05

## 2018-12-19 RX ADMIN — KETOROLAC TROMETHAMINE 15 MG: 15 INJECTION, SOLUTION INTRAMUSCULAR; INTRAVENOUS at 09:39

## 2018-12-19 RX ADMIN — TRAMADOL HYDROCHLORIDE 50 MG: 50 TABLET, COATED ORAL at 09:05

## 2018-12-19 RX ADMIN — ACETAMINOPHEN 650 MG: 325 TABLET ORAL at 15:14

## 2018-12-19 RX ADMIN — FAMOTIDINE 20 MG: 10 INJECTION INTRAVENOUS at 09:05

## 2018-12-19 RX ADMIN — TRAMADOL HYDROCHLORIDE 50 MG: 50 TABLET, COATED ORAL at 15:14

## 2018-12-19 RX ADMIN — ACETAMINOPHEN 650 MG: 325 TABLET ORAL at 09:05

## 2018-12-19 RX ADMIN — OXYCODONE HYDROCHLORIDE 5 MG: 5 TABLET ORAL at 17:35

## 2018-12-19 RX ADMIN — CELECOXIB 400 MG: 200 CAPSULE ORAL at 09:05

## 2018-12-19 RX ADMIN — POLYETHYLENE GLYCOL 3350 17 G: 17 POWDER, FOR SOLUTION ORAL at 09:05

## 2018-12-19 RX ADMIN — LEVOTHYROXINE SODIUM 112 MCG: 112 TABLET ORAL at 06:17

## 2018-12-19 RX ADMIN — DEXAMETHASONE SODIUM PHOSPHATE 10 MG: 4 INJECTION, SOLUTION INTRA-ARTICULAR; INTRALESIONAL; INTRAMUSCULAR; INTRAVENOUS; SOFT TISSUE at 09:05

## 2018-12-19 RX ADMIN — SENNOSIDES AND DOCUSATE SODIUM 1 TABLET: 8.6; 5 TABLET ORAL at 09:05

## 2018-12-19 RX ADMIN — OXYCODONE HYDROCHLORIDE 5 MG: 5 TABLET ORAL at 09:39

## 2018-12-19 ASSESSMENT — COGNITIVE AND FUNCTIONAL STATUS - GENERAL
WALKING IN HOSPITAL ROOM: 4 - NONE
STANDING UP FROM CHAIR USING ARMS: 4 - NONE
CLIMB 3 TO 5 STEPS WITH RAILING: 4 - NONE
MOVING TO AND FROM BED TO CHAIR: 4 - NONE

## 2018-12-19 NOTE — PLAN OF CARE
Problem: Patient Care Overview  Goal: Plan of Care Review  Outcome: Ongoing (interventions implemented as appropriate)   12/19/18 0050   Coping/Psychosocial   Plan Of Care Reviewed With patient   Plan of Care Review   Progress progress toward functional goals as expected   Outcome Summary Pt to report pain less than 5/10 after administration of pain medication during this shift.        Problem: Knee Arthroplasty (Total, Partial) (Adult)  Intervention: Support Surgical/Anesthesia Recovery   12/18/18 2000   Restraint Interventions   Safety Promotion/Fall Prevention activity supervised;muscle strengthening facilitated;nonskid shoes/slippers when out of bed;safety round/check completed;toileting scheduled;fall prevention program initiated     Intervention: Monitor/Manage Postoperative Bleeding   12/18/18 2000   Safety Interventions   Bleeding Management dressing monitored     Intervention: Manage Acute Orthopaedic-related Pain   12/18/18 2000   Manage Acute Burn Pain   Pain Management Interventions care clustered;pain management plan reviewed with patient/caregiver     Intervention: Prevent/Manage Post-surgical Infection   12/19/18 0050   Prevent/Manage Colorectal Surgical Infection   Fever Reduction/Comfort Measures pillow placement   Safety Interventions   Infection Prevention rest/sleep promoted;single patient room provided     Intervention: Promote Early Mobility/Activity   12/18/18 2000   Activity   Assistive Device Utilized walker   Activity activity adjusted per tolerance;ambulated in rodriguez;ambulated in room;back to bed     Intervention: Prevent/Manage Impaired Postoperative Elimination   12/19/18 0050   Manage Acute Burn Pain   Bowel Intervention adequate fluid intake promoted     Intervention: Prevent/Minimize Functional Deficit   12/19/18 0050   Daily Care Interventions   Self-Care Promotion independence encouraged     Intervention: Prevent/Manage DVT/VTE Risk   12/18/18 2000   Minimize Embolism Risk   VTE  Prevention/Management sequential compression devices;bilateral;on     Intervention: Support Psychosocial Response to Surgery   12/19/18 0050   Coping/Psychosocial Interventions   Supportive Measures active listening utilized       Goal: Signs and Symptoms of Listed Potential Problems Will be Absent, Minimized or Managed (Knee Arthroplasty)  Outcome: Ongoing (interventions implemented as appropriate)   12/19/18 0050   Knee Arthroplasty (Total, Partial)   Problems Assessed (Knee Arthroplasty) all   Problems Present (Knee Arthroplasty) pain     Goal: Anesthesia/Sedation Recovery  Outcome: Outcome(s) Achieved Date Met: 12/19/18 12/19/18 0050   Goal/Outcome Evaluation   Anesthesia/Sedation Recovery recovered to baseline

## 2018-12-19 NOTE — PROGRESS NOTES
Patient: Sharon Morales  Location: [facility] 4A 4017  MRN: [id number]  Today's date: 12/19/2018    PT informed nurse that patient cleared from PT. No further acute PT needs.    Prior Living Environment  Lives With: spouse, child(erich), dependent (4 kids 2 dogs)  Living Arrangements: house  Living Environment Comment: 1 SIVAN, FF to bed/bath, powder room Equipment Currently Used at Home: walker, rolling, cane, straight       Prior Level of Function  Ambulation: independent  Transferring: independent  Toileting: independent  Bathing: independent  Dressing: independent  Eating: independent  Communication: understands/communicates without difficulty  Swallowing: swallows foods/liquids without difficulty  Equipment Currently Used at Home: walker, rolling, cane, straight           PT Treatment Summary - 12/19/18 0945        Session Details    Document Type daily treatment    Mode of Treatment physical therapy       Time Calculation    Start Time 0945    Stop Time 1005    Time Calculation (min) 20 min       General Information    Patient Profile Reviewed? yes    Pertinent History of Current Functional Problem L knee rev    Existing Precautions/Restrictions fall;weight bearing       Weight Bearing Status    Left LE Weight Bearing Status weight bearing as tolerated       Sit to Stand Transfer    [place], Sit to Stand Transfer modified independence    Assistive Device walker, front-wheeled    Comment Ind w/ car transfer today       Stand to Sit Transfer    [place], Stand to Sit Transfer modified independence    Assistive Device walker, front-wheeled       Gait Training    [place], Gait modified independence    Assistive Device walker, front-wheeled    Distance in Feet 400 feet    Gait Pattern Utilized step-through    Gait Deviations Identified --   mildly antalgic       Stairs Training    [place], Stairs independent    Assistive Device cane, straight    Handrail Location right side (ascending)    Number of  Stairs 8    Ascending Stairs Technique step-to-step    Descending Stairs Technique step-to-step       AM-PAC (TM) - Mobility (Current Function)    Turning from your back to your side while in a flat bed without using bedrails? 4 - None    Moving from lying on your back to sitting on the side of a flat bed without using bedrails? 4 - None    Moving to and from a bed to a chair? 4 - None    Standing up from a chair using your arms? 4 - None    To walk in a hospital room? 4 - None    Climbing 3-5 steps with a railing? 4 - None    AM-PAC (TM) Mobility Score 24       PT Clinical Impression    Anticipated Equipment Needs at Discharge none    Expected Discharge Disposition home with outpatient services    Daily Outcome Statement family to assist at d/c                        Education provided this session. See the Patient Education summary report for full details.    PT Care Plan Goals      Most Recent Value   Stair Goal, PT   PT STG: Stairs  modified independence   PT STG: Number of Stairs  12      PT Care Plan Goals      Most Recent Value   Gait Goal   Date Goal Established: Gait Training  12/11/18   Time to Achieve Goal: Gait Training  by discharge   Level of [place]  modified independence   Assistive Device: Gait Training  walker, rolling   Distance Goal: Gait Training (feet)  200 feet   Transfer Goal   Date Goal Established: Transfer Training  12/18/18   Time to Achieve Goal: Transfer Training  by discharge   Goal Activity: Transfer Training  all transfers   Level of [place] Goal: Transfer Training  independent

## 2018-12-19 NOTE — PLAN OF CARE
Problem: Patient Care Overview  Goal: Plan of Care Review  Outcome: Ongoing (interventions implemented as appropriate)   12/19/18 6119   Coping/Psychosocial   Plan Of Care Reviewed With patient   Plan of Care Review   Progress improving   Goal of the Day: patient will verbally understand her plan of care this shift.

## 2018-12-19 NOTE — PROGRESS NOTES
Patient sitting in chair in NAD.  AAOx3, AVSS, Hgb 9.0  Denies CP/SOB, nausea or vomiting  Reported increased pain this am  LLE dressing CDI>removed  Left knee incision CDI with Mepilex to incision  LLE +DP, +DF/PF, sensation intact  Calves soft/nontender  RAYMOND stocking applied to LLE  A/P: POD#1 left knee revision  Pain control>PDMP reviewed>additional dose of oxycodone x 1 given and toradol x 1 given  PT/OT>WBAT  DVT proph>ASA BID  Continue incentive spirometer 10x/hour  DC to home today after PT clears  Patient reported that she reacted in past to dermabond>Dr. Gross would like patient to go on Medrol dose pack if reaction occurs again

## 2018-12-19 NOTE — PROGRESS NOTES
Daily Progress Note    Subjective    Interval History: Patient denies Fever/Chills,  N/V/D, SOB, Chest pain/Pressure, Palpitations, Cough/Wheezing, Abd Pain, Back Pain, Rash/Itch, Headache, Dizziness/Lightheadedness.  Patient is eating/drinking well and urinating okay.     Allergies - List reviewed  Family/Social Hx - No change    Objective    Vital signs in last 24 hours:  Temp:  [36.6 °C (97.9 °F)-37.1 °C (98.7 °F)] 36.7 °C (98 °F)  Heart Rate:  [56-84] 62  Resp:  [13-18] 18  BP: ()/(50-82) 98/55      Intake/Output Summary (Last 24 hours) at 12/19/18 0941  Last data filed at 12/18/18 2100   Gross per 24 hour   Intake             1230 ml   Output             1100 ml   Net              130 ml         Physical Exam   Constitutional: Appears well-developed/well-nourished. No distress.   Head: Normocephalic and atraumatic.   Eyes: Conjunctivae wnl. EOMI. PERRLA   Neck: Normal range of motion. Neck supple.   Cardiovascular: RRR, nl heart sounds, intact distal pulses.    Pulmonary/Chest:  Effort normal/breath sounds CTA. No Rales/Rhonchi/Wheezing.  Abdominal: Soft. NT. Bowel sounds nl. No distension.   Genitourinary: No suprapubic tenderness or fullness  Musculoskeletal: left knee with mepilex c/d/i  Ext: No Cyanosis, Clubbing, Edema  Neurological: AAO x 3   Skin: Skin is warm and dry. No rash noted.    Psychiatric:  Normal affect/mood. Behavior wnl. Judgment/Thought nl.       Labs:   Lab Results   Component Value Date    WBC 6.80 12/19/2018    HGB 9.0 (L) 12/19/2018    HCT 28.2 (L) 12/19/2018    MCV 87.3 12/19/2018     12/19/2018     Lab Results   Component Value Date    GLUCOSE 88 12/19/2018    CALCIUM 8.2 (L) 12/19/2018     12/19/2018    K 4.3 12/19/2018    CO2 21 (L) 12/19/2018     12/19/2018    BUN 10 12/19/2018    CREATININE 0.5 (L) 12/19/2018     @RESUFAST(ALT,AST,GGT,ALKPHOS,BILI)    Imaging:  )X-ray Knee Left 1 Or 2 Views    Result Date: 12/18/2018  CLINICAL HISTORY:    48-year-old  female status post left knee arthroplasty.     IMPRESSION: Recent left total knee replacement. COMMENT:   AP and lateral radiographs of the left knee show total joint replacement with cemented longstem femoral condylar and tibial plateau components that appear in satisfactory position.  Intravenous and periarticular gas are consistent with recent surgery. The alignment at the femorotibial and femoropatellar joints is anatomic.         Assessment & Plan    Knee stiffness, left  S/P left revision Total knee replacement, IS 10x's/hr/day, IV antibx per ortho protocol, BMP and CBC reviewed.    At risk for venous thromboembolism (VTE)  ASA 81mg bid    Hypothyroidism  Resume Levothyroxine 112mcg daily    Pain management  Decadron, Tylenol, Tramadol, and Oxycodone and Dilaudid prn    Constipation  Miralax and Senokot-S daily    Leukopenia  Wbc 6.8 this am and nl. Chronic history of leukopenia. Check CBC as an outpt.    Acute blood loss anemia  hct 28 this am and decr 2nd acute surgical blood loss, f/u cbc as an outpt      VTE Assessment: I have reassessed and the patient's VTE risk and treatment plan is appropriate.    Expected Discharge Date:  12/20/2018

## 2018-12-23 LAB
GRAM STN SPEC: NORMAL
GRAM STN SPEC: NORMAL
MICROORGANISM SPEC CULT: NORMAL

## 2019-01-09 ENCOUNTER — TELEPHONE (OUTPATIENT)
Dept: FAMILY MEDICINE | Facility: CLINIC | Age: 49
End: 2019-01-09

## 2019-01-09 DIAGNOSIS — D70.9 NEUTROPENIA, UNSPECIFIED TYPE (CMS/HCC): Primary | ICD-10-CM

## 2019-01-09 NOTE — TELEPHONE ENCOUNTER
Patient had her left knee revision 12/18/18.  She was to let you know when she had this.  She states you had discussed repeat lab work.  Does she need a blood test repeated?

## 2019-01-15 LAB — FUNGUS SPEC CULT: NORMAL

## 2019-01-18 LAB
BASOPHILS # BLD AUTO: 49 CELLS/UL (ref 0–200)
BASOPHILS NFR BLD AUTO: 1.9 %
EOSINOPHIL # BLD AUTO: 211 CELLS/UL (ref 15–500)
EOSINOPHIL NFR BLD AUTO: 8.1 %
ERYTHROCYTE [DISTWIDTH] IN BLOOD BY AUTOMATED COUNT: 15.4 % (ref 11–15)
HCT VFR BLD AUTO: 40.4 % (ref 35–45)
HGB BLD-MCNC: 12.9 G/DL (ref 11.7–15.5)
LYMPHOCYTES # BLD AUTO: 946 CELLS/UL (ref 850–3900)
LYMPHOCYTES NFR BLD AUTO: 36.4 %
MCH RBC QN AUTO: 28.9 PG (ref 27–33)
MCHC RBC AUTO-ENTMCNC: 31.9 G/DL (ref 32–36)
MCV RBC AUTO: 90.6 FL (ref 80–100)
MONOCYTES # BLD AUTO: 403 CELLS/UL (ref 200–950)
MONOCYTES NFR BLD AUTO: 15.5 %
NEUTROPHILS # BLD AUTO: 991 CELLS/UL (ref 1500–7800)
NEUTROPHILS NFR BLD AUTO: 38.1 %
PLATELET # BLD AUTO: 226 THOUSAND/UL (ref 140–400)
PMV BLD REES-ECKER: 10.5 FL (ref 7.5–12.5)
RBC # BLD AUTO: 4.46 MILLION/UL (ref 3.8–5.1)
WBC # BLD AUTO: 2.6 THOUSAND/UL (ref 3.8–10.8)

## 2019-01-21 ENCOUNTER — TELEPHONE (OUTPATIENT)
Dept: FAMILY MEDICINE | Facility: CLINIC | Age: 49
End: 2019-01-21

## 2019-01-21 NOTE — TELEPHONE ENCOUNTER
Pt aware and understands states aunt and dad have leukopenia asking if she can be tested for this before seeing hem/onc

## 2019-01-22 NOTE — TELEPHONE ENCOUNTER
[place] this is why she needs to see hematologist they will do all the necessary testing for that!! She needs to make them aware of this when she sees them- they may need to do bone marrow biopsy just for her to be prepared!

## 2019-01-28 LAB — HM COLONOSCOPY: NORMAL

## 2019-01-28 NOTE — TELEPHONE ENCOUNTER
Pt wanted you to be aware that she was able to get an appt with hem/onc for tomorrow with Dr Raphael.

## 2019-01-29 ENCOUNTER — OFFICE VISIT (OUTPATIENT)
Dept: HEMATOLOGY/ONCOLOGY | Facility: CLINIC | Age: 49
End: 2019-01-29
Attending: INTERNAL MEDICINE
Payer: COMMERCIAL

## 2019-01-29 VITALS
WEIGHT: 119.8 LBS | SYSTOLIC BLOOD PRESSURE: 127 MMHG | BODY MASS INDEX: 25.04 KG/M2 | HEART RATE: 103 BPM | DIASTOLIC BLOOD PRESSURE: 54 MMHG | RESPIRATION RATE: 18 BRPM | TEMPERATURE: 97.7 F

## 2019-01-29 DIAGNOSIS — E53.8 VITAMIN B12 DEFICIENCY: ICD-10-CM

## 2019-01-29 DIAGNOSIS — D72.819 LEUKOPENIA, UNSPECIFIED TYPE: Primary | ICD-10-CM

## 2019-01-29 PROCEDURE — 99244 OFF/OP CNSLTJ NEW/EST MOD 40: CPT | Performed by: INTERNAL MEDICINE

## 2019-01-29 ASSESSMENT — ENCOUNTER SYMPTOMS
EYES NEGATIVE: 1
RESPIRATORY NEGATIVE: 1
ENDOCRINE NEGATIVE: 1
PSYCHIATRIC NEGATIVE: 1
FEVER: 0
HEMATOLOGIC/LYMPHATIC NEGATIVE: 1
GASTROINTESTINAL NEGATIVE: 1
UNEXPECTED WEIGHT CHANGE: 0
ARTHRALGIAS: 1
APPETITE CHANGE: 0
FATIGUE: 0
CONSTITUTIONAL NEGATIVE: 1
NEUROLOGICAL NEGATIVE: 1
DIAPHORESIS: 0
CARDIOVASCULAR NEGATIVE: 1
CHILLS: 0

## 2019-01-29 NOTE — PROGRESS NOTES
Review of Systems   Constitutional: Negative.    HENT:  Negative.    Eyes: Negative.    Respiratory: Negative.    Cardiovascular: Negative.    Gastrointestinal: Negative.    Endocrine: Negative.    Genitourinary: Negative.     Musculoskeletal: Positive for arthralgias (BL knees. Patient had L knee revision in Dec.  R knee was done Jan 2018).   Skin: Negative.    Neurological: Negative.    Hematological: Negative.    Psychiatric/Behavioral: Negative.

## 2019-01-29 NOTE — LETTER
January 29, 2019    Patient: Sharon Morales   YOB: 1970   Date of Visit: 1/29/2019       Dear Dr. Wilson:    The patient is seen back at the [facility] IN [place] today in follow up with regard to her   1. Leukopenia, unspecified type    2. Vitamin B12 deficiency    . Attached is my assessment and plan of care.         Sincerely,        Lianna Raphael MD    CC: No Recipients

## 2019-01-29 NOTE — ASSESSMENT & PLAN NOTE
She has a history of leukopenia without any recurrent infections.  She notes that her father has a low white count too and thus it may be familial.  However she also reports that she has been told that she has rheumatoid arthritis all of her life.  Thus there is a possibility of this being autoimmune disorder.  In an autoimmune phenomena, the count typically normalizes during times of stress or infection.  Her count has normalized during her surgical procedures.  At this time there is not a clear-cut etiology of her leukopenia, however she is asymptomatic from it and there is not been the development of any symptoms over the past year or 2 since she is had it.  I am going to perform a laboratory evaluation including a CBC,B12, folate, SPEP, serum light chains, rheumatoid factor, and YUMIKO.  I will contact her by phone.  If these counts are normal we can continue to follow her counts and monitor them.  If they worsen or she develops symptomatology then we would pursue further evaluation.  The alternative would be to perform a bone marrow aspirate and biopsy to confirm that there is not a primary bone marrow disorder.  At this time I would favor following her with her surveillance.  This was discussed with her.  She was given a prescription to get her blood work.  I will contact her by phone.

## 2019-01-30 LAB — MYCOBACTERIUM SPEC CULT: NORMAL

## 2019-02-01 LAB
ALBUMIN SERPL ELPH-MCNC: 4 G/DL (ref 3.8–4.8)
ALPHA1 GLOB SERPL ELPH-MCNC: 0.2 G/DL (ref 0.2–0.3)
ALPHA2 GLOB SERPL ELPH-MCNC: 0.6 G/DL (ref 0.5–0.9)
ANA SER QL IF: NEGATIVE
BASOPHILS # BLD AUTO: 29 CELLS/UL (ref 0–200)
BASOPHILS NFR BLD AUTO: 0.8 %
BETA1 GLOB SERPL ELPH-MCNC: 0.4 G/DL (ref 0.4–0.6)
BETA2 GLOB SERPL ELPH-MCNC: 0.2 G/DL (ref 0.2–0.5)
EOSINOPHIL # BLD AUTO: 209 CELLS/UL (ref 15–500)
EOSINOPHIL NFR BLD AUTO: 5.8 %
ERYTHROCYTE [DISTWIDTH] IN BLOOD BY AUTOMATED COUNT: 14.7 % (ref 11–15)
FOLATE SERPL-MCNC: >24 NG/ML
GAMMA GLOB SERPL ELPH-MCNC: 1.1 G/DL (ref 0.8–1.7)
HCT VFR BLD AUTO: 40.5 % (ref 35–45)
HGB BLD-MCNC: 13.1 G/DL (ref 11.7–15.5)
KAPPA LC FREE SER-MCNC: 11 MG/L (ref 3.3–19.4)
KAPPA LC FREE/LAMBDA FREE SER: 0.99 {RATIO} (ref 0.26–1.65)
LAMBDA LC FREE SERPL-MCNC: 11.1 MG/L (ref 5.7–26.3)
LYMPHOCYTES # BLD AUTO: 1033 CELLS/UL (ref 850–3900)
LYMPHOCYTES NFR BLD AUTO: 28.7 %
MCH RBC QN AUTO: 28.7 PG (ref 27–33)
MCHC RBC AUTO-ENTMCNC: 32.3 G/DL (ref 32–36)
MCV RBC AUTO: 88.8 FL (ref 80–100)
MONOCYTES # BLD AUTO: 407 CELLS/UL (ref 200–950)
MONOCYTES NFR BLD AUTO: 11.3 %
NEUTROPHILS # BLD AUTO: 1922 CELLS/UL (ref 1500–7800)
NEUTROPHILS NFR BLD AUTO: 53.4 %
PLATELET # BLD AUTO: 263 THOUSAND/UL (ref 140–400)
PMV BLD REES-ECKER: 11.3 FL (ref 7.5–12.5)
PROT PATTERN SERPL ELPH-IMP: (no result)
PROT SERPL-MCNC: 6.5 G/DL (ref 6.1–8.1)
RBC # BLD AUTO: 4.56 MILLION/UL (ref 3.8–5.1)
RHEUMATOID FACT SERPL-ACNC: <14 IU/ML
VIT B12 SERPL-MCNC: 617 PG/ML (ref 200–1100)
WBC # BLD AUTO: 3.6 THOUSAND/UL (ref 3.8–10.8)

## 2019-02-04 ENCOUNTER — TELEPHONE (OUTPATIENT)
Dept: HEMATOLOGY/ONCOLOGY | Facility: CLINIC | Age: 49
End: 2019-02-04

## 2019-02-04 NOTE — TELEPHONE ENCOUNTER
Patient was called with laboratory evaluation of her leukopenia.  Repeat CBC revealed slightly low white count of 3.6 with normal differential, hemoglobin and platelet count.  Laboratory evaluation unrevealing.  He was also made aware that she does not have rheumatoid arthritis which she believed that she had.  Her RF and YUMIKO were normal peer she will continued to be followed with observation at this time.  CBC can be checked every 6 months.  She is going to continue under care of Dr. Wilson.  If there is a change in her symptomatology or in her laboratory studies she is aware that I am available.  However, the last test available is a bone marrow aspirate and biopsy.  She really does not give any signs or symptoms of a primary bone marrow disorder.  All of her questions were answered.

## 2019-02-13 ENCOUNTER — TELEPHONE (OUTPATIENT)
Dept: FAMILY MEDICINE | Facility: CLINIC | Age: 49
End: 2019-02-13

## 2019-02-13 NOTE — TELEPHONE ENCOUNTER
Patient thinks that she found a lump on left breast. Wanted to message the doctor but she decided to schedule an appointment for tomorrow. Message no longer needed

## 2019-02-14 ENCOUNTER — OFFICE VISIT (OUTPATIENT)
Dept: FAMILY MEDICINE | Facility: CLINIC | Age: 49
End: 2019-02-14
Payer: COMMERCIAL

## 2019-02-14 VITALS
SYSTOLIC BLOOD PRESSURE: 120 MMHG | OXYGEN SATURATION: 98 % | DIASTOLIC BLOOD PRESSURE: 72 MMHG | HEART RATE: 82 BPM | TEMPERATURE: 98.2 F | RESPIRATION RATE: 16 BRPM | WEIGHT: 119 LBS | BODY MASS INDEX: 24.87 KG/M2

## 2019-02-14 DIAGNOSIS — R22.32 AXILLARY MASS, LEFT: Primary | ICD-10-CM

## 2019-02-14 PROBLEM — M25.662 KNEE STIFFNESS, LEFT: Status: RESOLVED | Noted: 2018-12-18 | Resolved: 2019-02-14

## 2019-02-14 PROBLEM — D62 ACUTE BLOOD LOSS ANEMIA: Status: RESOLVED | Noted: 2018-12-19 | Resolved: 2019-02-14

## 2019-02-14 PROBLEM — K59.00 CONSTIPATION: Status: RESOLVED | Noted: 2018-12-18 | Resolved: 2019-02-14

## 2019-02-14 PROBLEM — R52 PAIN MANAGEMENT: Status: RESOLVED | Noted: 2018-12-18 | Resolved: 2019-02-14

## 2019-02-14 PROCEDURE — 99213 OFFICE O/P EST LOW 20 MIN: CPT | Performed by: FAMILY MEDICINE

## 2019-02-14 ASSESSMENT — ENCOUNTER SYMPTOMS
DIARRHEA: 0
FREQUENCY: 0
FEVER: 0
DIZZINESS: 0
COUGH: 0
HEMATURIA: 0
ABDOMINAL PAIN: 0
CHILLS: 0
RHINORRHEA: 0
SINUS PAIN: 0
SHORTNESS OF BREATH: 0
PALPITATIONS: 0
BACK PAIN: 0
DYSURIA: 0
SINUS PRESSURE: 0
FATIGUE: 0
NERVOUS/ANXIOUS: 0
CONSTIPATION: 0
SORE THROAT: 0
WHEEZING: 0
HEADACHES: 0
NECK PAIN: 0
LIGHT-HEADEDNESS: 0
SLEEP DISTURBANCE: 0

## 2019-02-14 NOTE — PROGRESS NOTES
Subjective      Patient ID: Sharon Morales is a 48 y.o. female.    She is here with L breast mass. She felt something few days ago. Her periods are irregular and feels she is going through menopausal symptoms. Her breasts have been tender as well. She did have mammo back in Oct which was normal. Paternal aunt with breast ca postmenopausal. No nipple discharge. No rashes or change in sizes.         The following have been reviewed and updated as appropriate in this visit:  Allergies  Meds  Problems       Review of Systems   Constitutional: Negative for chills, fatigue and fever.   HENT: Negative for ear pain, postnasal drip, rhinorrhea, sinus pain, sinus pressure and sore throat.    Respiratory: Negative for cough, shortness of breath and wheezing.    Cardiovascular: Negative for chest pain and palpitations.   Gastrointestinal: Negative for abdominal pain, constipation and diarrhea.   Genitourinary: Negative for dysuria, frequency, hematuria and urgency.        L axillary lump   Musculoskeletal: Negative for back pain and neck pain.   Skin: Negative for rash.   Neurological: Negative for dizziness, light-headedness and headaches.   Psychiatric/Behavioral: Negative for sleep disturbance and suicidal ideas. The patient is not nervous/anxious.        Current Outpatient Prescriptions   Medication Sig Dispense Refill   • levothyroxine (SYNTHROID) 112 mcg tablet TAKE 1 TABLET BY MOUTH EVERY DAY 90 tablet 3     No current facility-administered medications for this visit.      Past Medical History:   Diagnosis Date   • Arthritis     Knees -sp B/L TKR   • At risk for venous thromboembolism (VTE) 12/18/2018   • Constipation 12/18/2018   • Hypothyroidism    • Kidney stones    • Last menstrual period (LMP) > 10 days ago 10/10/2018   • Leukopenia 12/18/2018   • Motion sickness    • Pain management 12/18/2018   • PONV (postoperative nausea and vomiting)    • Seasonal allergies    • Snores      Family History   Problem Relation  Age of Onset   • Hypertension Mother    • COPD Mother    • Lymphoma Mother    • Skin cancer Father    • Stomach cancer Other      Past Surgical History:   Procedure Laterality Date   • BUNIONECTOMY Bilateral    •  SECTION      x 3   • JOINT REPLACEMENT Bilateral    • REVISION TOTAL KNEE ARTHROPLASTY Right 2018     Social History     Social History   • Marital status:      Spouse name: N/A   • Number of children: N/A   • Years of education: N/A     Occupational History   • [employer]st      Social History Main Topics   • Smoking status: Never Smoker   • Smokeless tobacco: Never Used   • Alcohol use 0.6 oz/week     1 Glasses of wine per week      Comment: occas   • Drug use: No   • Sexual activity: Defer     Other Topics Concern   • Not on file     Social History Narrative   • No narrative on file     Allergies   Allergen Reactions   • Avocado      Other reaction(s): vomiting       Objective   /72 (BP Location: Right upper arm, Patient Position: Sitting)   Pulse 82   Temp 36.8 °C (98.2 °F) (Oral)   Resp 16   Wt 54 kg (119 lb)   SpO2 98%   BMI 24.87 kg/m²   Physical Exam   Constitutional: She is oriented to person, place, and time. She appears well-developed and well-nourished.   HENT:   Head: Normocephalic and atraumatic.   Right Ear: External ear normal.   Left Ear: External ear normal.   Eyes: Conjunctivae are normal. Pupils are equal, round, and reactive to light.   Neck: Normal range of motion. Neck supple. No thyromegaly present.   Cardiovascular: Normal rate, regular rhythm and normal heart sounds.    No murmur heard.  Pulmonary/Chest: Effort normal and breath sounds normal. She has no wheezes. Right breast exhibits no inverted nipple, no mass, no nipple discharge, no skin change and no tenderness. Left breast exhibits no inverted nipple, no mass, no nipple discharge, no skin change and no tenderness.   L axillary- possibly felt tender non movable lymph node deep in axilla    Abdominal: Soft. Bowel sounds are normal. There is no tenderness.   Musculoskeletal: Normal range of motion. She exhibits no tenderness.   Lymphadenopathy:     She has no cervical adenopathy.   Neurological: She is alert and oriented to person, place, and time. No cranial nerve deficit.   Skin: Skin is warm and dry. No rash noted.   Psychiatric: She has a normal mood and affect.   Nursing note and vitals reviewed.      Assessment/Plan   Problem List Items Addressed This Visit     None      Visit Diagnoses     Axillary mass, left    -  Primary    Relevant Orders    ULTRASOUND AXILLA LEFT    BI DIAGNOSTIC MAMMOGRAM LEFT      We will start with US of axillary region along with L diagnostic mammo if needed and pending results may need to see breast specialist. We will await the results.     Natalia Wilson, DO  2/14/2019

## 2019-03-05 ENCOUNTER — HOSPITAL ENCOUNTER (OUTPATIENT)
Dept: RADIOLOGY | Age: 49
Discharge: HOME | End: 2019-03-05
Attending: FAMILY MEDICINE
Payer: COMMERCIAL

## 2019-03-05 DIAGNOSIS — R22.32 AXILLARY MASS, LEFT: ICD-10-CM

## 2019-03-05 PROCEDURE — G0279 TOMOSYNTHESIS, MAMMO: HCPCS | Mod: LT

## 2019-03-05 PROCEDURE — 76882 US LMTD JT/FCL EVL NVASC XTR: CPT | Mod: LT

## 2019-04-05 ENCOUNTER — TRANSCRIBE ORDERS (OUTPATIENT)
Dept: REGISTRATION | Age: 49
End: 2019-04-05

## 2019-04-05 ENCOUNTER — HOSPITAL ENCOUNTER (OUTPATIENT)
Dept: RADIOLOGY | Age: 49
Discharge: HOME | End: 2019-04-05
Attending: ORTHOPAEDIC SURGERY
Payer: COMMERCIAL

## 2019-04-05 DIAGNOSIS — Z96.652 PRESENCE OF LEFT ARTIFICIAL KNEE JOINT: Primary | ICD-10-CM

## 2019-04-05 DIAGNOSIS — Z96.652 PRESENCE OF LEFT ARTIFICIAL KNEE JOINT: ICD-10-CM

## 2019-04-05 PROCEDURE — 73564 X-RAY EXAM KNEE 4 OR MORE: CPT | Mod: LT

## 2019-04-24 RX ORDER — OLOPATADINE HYDROCHLORIDE 1 MG/ML
SOLUTION/ DROPS OPHTHALMIC
Qty: 5 ML | Refills: 1 | Status: SHIPPED | OUTPATIENT
Start: 2019-04-24 | End: 2019-06-06 | Stop reason: SDUPTHER

## 2019-10-29 ENCOUNTER — TRANSCRIBE ORDERS (OUTPATIENT)
Dept: SCHEDULING | Age: 49
End: 2019-10-29

## 2019-10-29 DIAGNOSIS — Z12.31 ENCOUNTER FOR SCREENING MAMMOGRAM FOR MALIGNANT NEOPLASM OF BREAST: Primary | ICD-10-CM

## 2019-11-04 ENCOUNTER — HOSPITAL ENCOUNTER (OUTPATIENT)
Dept: RADIOLOGY | Age: 49
Discharge: HOME | End: 2019-11-04
Attending: OBSTETRICS & GYNECOLOGY
Payer: COMMERCIAL

## 2019-11-04 DIAGNOSIS — Z12.31 ENCOUNTER FOR SCREENING MAMMOGRAM FOR MALIGNANT NEOPLASM OF BREAST: ICD-10-CM

## 2019-11-04 PROCEDURE — 77067 SCR MAMMO BI INCL CAD: CPT

## 2020-03-25 ENCOUNTER — TELEPHONE (OUTPATIENT)
Dept: PRIMARY CARE | Facility: CLINIC | Age: 50
End: 2020-03-25

## 2020-03-25 RX ORDER — LEVOTHYROXINE SODIUM 112 UG/1
112 TABLET ORAL
Qty: 90 TABLET | Refills: 0 | Status: SHIPPED | OUTPATIENT
Start: 2020-03-25 | End: 2020-06-17

## 2020-03-25 NOTE — TELEPHONE ENCOUNTER
Medicine Refill Request    Last Office Visit: Visit date not found  Next Office Visit: 8/4/2020        Current Outpatient Medications:   •  levothyroxine (SYNTHROID) 112 mcg tablet, TAKE 1 TABLET BY MOUTH EVERY DAY, Disp: 90 tablet, Rfl: 0  •  olopatadine (PATANOL) 0.1 % ophthalmic solution, INSTILL 1 DROP INTO AFFECTED EYE(S) TWICE A DAY AT AN INTERVAL OF 6 TO 8 HOURS AS NEEDED, Disp: 5 mL, Rfl: 2      BP Readings from Last 3 Encounters:   02/14/19 120/72   01/29/19 (!) 127/54   12/19/18 (!) 118/57       Recent Lab results:  Lab Results   Component Value Date    CHOL 169 11/08/2018   ,   Lab Results   Component Value Date    HDL 68 11/08/2018   ,   Lab Results   Component Value Date    LDLCALC 87 11/08/2018   ,   Lab Results   Component Value Date    TRIG 49 11/08/2018        Lab Results   Component Value Date    GLUCOSE 88 12/19/2018   ,   Lab Results   Component Value Date    HGBA1C 5.1 01/03/2018         Lab Results   Component Value Date    CREATININE 0.5 (L) 12/19/2018       Lab Results   Component Value Date    TSH 0.45 11/08/2018

## 2020-03-25 NOTE — TELEPHONE ENCOUNTER
Patient is requesting for levothyroxine 112 mcg to be sent to [facility] in [place] on [address]. She only have a couple days left of her medication.

## 2020-03-25 NOTE — TELEPHONE ENCOUNTER
Please call patient and set up an appointment for a PE within the next few months.  Last appointment was in feb 2019 and she is overdue for labs.  Let her know that I sent in a refill but she needs to be seen before her next refill.  TY

## 2020-06-25 ENCOUNTER — TELEPHONE (OUTPATIENT)
Dept: PRIMARY CARE | Facility: CLINIC | Age: 50
End: 2020-06-25

## 2020-06-25 NOTE — TELEPHONE ENCOUNTER
Do you have enough medication for the next 5 days?: Yes    Did you request this medication through your pharmacy or our patient portal in the last day or two? No    Name of medication requested: Synthriod  Medication Strength: 112mcg  Mediation Directions: 1 daily  Quantity Requested (example 30/90): 30/90  Number of refills requested: 3      System Generated Preferred Pharmacy(s)  are as follows.  If more than one pharmacy displays please identify which one should be used for this call    Has the pharmacy information below been confirmed with the patient?         [id number] - [place], [place] - [address]  [address] [place] [id number]  Phone: [phone] Fax: [phone]          If necessary, provide pharmacy details below.     Is this pharmacy a mail order pharmacy?:   Pharmacy Name:   Pharmacy City:   Pharmacy Telephone:     Additional Comments:    Next Encounter with this provider: 8/4/2020

## 2020-08-03 ENCOUNTER — OFFICE VISIT (OUTPATIENT)
Dept: PRIMARY CARE | Facility: CLINIC | Age: 50
End: 2020-08-03
Payer: COMMERCIAL

## 2020-08-03 VITALS
RESPIRATION RATE: 16 BRPM | HEIGHT: 58 IN | TEMPERATURE: 98.3 F | DIASTOLIC BLOOD PRESSURE: 62 MMHG | HEART RATE: 88 BPM | SYSTOLIC BLOOD PRESSURE: 102 MMHG | BODY MASS INDEX: 23.93 KG/M2 | OXYGEN SATURATION: 98 % | WEIGHT: 114 LBS

## 2020-08-03 DIAGNOSIS — E03.9 HYPOTHYROIDISM, UNSPECIFIED TYPE: ICD-10-CM

## 2020-08-03 DIAGNOSIS — Z00.00 ROUTINE PHYSICAL EXAMINATION: Primary | ICD-10-CM

## 2020-08-03 DIAGNOSIS — D72.819 LEUKOPENIA, UNSPECIFIED TYPE: ICD-10-CM

## 2020-08-03 PROCEDURE — 99396 PREV VISIT EST AGE 40-64: CPT | Performed by: FAMILY MEDICINE

## 2020-08-03 RX ORDER — LEVOTHYROXINE SODIUM 112 UG/1
112 TABLET ORAL
Qty: 30 TABLET | Refills: 1 | Status: SHIPPED | OUTPATIENT
Start: 2020-08-03 | End: 2020-08-18 | Stop reason: DRUGHIGH

## 2020-08-03 ASSESSMENT — ENCOUNTER SYMPTOMS
CHEST TIGHTNESS: 0
CONSTIPATION: 0
ARTHRALGIAS: 0
BRUISES/BLEEDS EASILY: 0
WHEEZING: 0
COUGH: 0
NERVOUS/ANXIOUS: 0
SHORTNESS OF BREATH: 0
FATIGUE: 0
RHINORRHEA: 0
MYALGIAS: 0
ABDOMINAL PAIN: 0
FREQUENCY: 0
CHILLS: 0
PALPITATIONS: 0
EYE PAIN: 0
SINUS PRESSURE: 0
HEMATURIA: 0
DIZZINESS: 0
WEAKNESS: 0
SORE THROAT: 0
SINUS PAIN: 0
HEADACHES: 0
FEVER: 0
DIARRHEA: 0

## 2020-08-18 DIAGNOSIS — E03.9 HYPOTHYROIDISM, UNSPECIFIED TYPE: ICD-10-CM

## 2020-08-18 DIAGNOSIS — D64.9 ANEMIA, UNSPECIFIED TYPE: ICD-10-CM

## 2020-08-18 DIAGNOSIS — D72.819 LEUKOPENIA, UNSPECIFIED TYPE: Primary | ICD-10-CM

## 2020-08-18 LAB
ALBUMIN SERPL-MCNC: 4.2 G/DL (ref 3.6–5.1)
ALBUMIN/GLOB SERPL: 1.9 (CALC) (ref 1–2.5)
ALP SERPL-CCNC: 84 U/L (ref 37–153)
ALT SERPL-CCNC: 14 U/L (ref 6–29)
AST SERPL-CCNC: 22 U/L (ref 10–35)
BILIRUB SERPL-MCNC: 0.4 MG/DL (ref 0.2–1.2)
BUN SERPL-MCNC: 17 MG/DL (ref 7–25)
BUN/CREAT SERPL: NORMAL (CALC) (ref 6–22)
CALCIUM SERPL-MCNC: 9.4 MG/DL (ref 8.6–10.4)
CHLORIDE SERPL-SCNC: 106 MMOL/L (ref 98–110)
CHOLEST SERPL-MCNC: 173 MG/DL
CHOLEST/HDLC SERPL: 2.5 (CALC)
CO2 SERPL-SCNC: 29 MMOL/L (ref 20–32)
CREAT SERPL-MCNC: 0.57 MG/DL (ref 0.5–1.05)
ERYTHROCYTE [DISTWIDTH] IN BLOOD BY AUTOMATED COUNT: 15.4 % (ref 11–15)
GLOBULIN SER CALC-MCNC: 2.2 G/DL (CALC) (ref 1.9–3.7)
GLUCOSE SERPL-MCNC: 82 MG/DL (ref 65–99)
HCT VFR BLD AUTO: 34.6 % (ref 35–45)
HDLC SERPL-MCNC: 70 MG/DL
HGB BLD-MCNC: 10.5 G/DL (ref 11.7–15.5)
LDLC SERPL CALC-MCNC: 88 MG/DL (CALC)
MCH RBC QN AUTO: 24.9 PG (ref 27–33)
MCHC RBC AUTO-ENTMCNC: 30.3 G/DL (ref 32–36)
MCV RBC AUTO: 82 FL (ref 80–100)
NONHDLC SERPL-MCNC: 103 MG/DL (CALC)
PLATELET # BLD AUTO: 279 THOUSAND/UL (ref 140–400)
PMV BLD REES-ECKER: 10.5 FL (ref 7.5–12.5)
POTASSIUM SERPL-SCNC: 3.9 MMOL/L (ref 3.5–5.3)
PROT SERPL-MCNC: 6.4 G/DL (ref 6.1–8.1)
QUEST EGFR NON-AFR. AMERICAN: 107 ML/MIN/1.73M2
RBC # BLD AUTO: 4.22 MILLION/UL (ref 3.8–5.1)
SODIUM SERPL-SCNC: 141 MMOL/L (ref 135–146)
T4 FREE SERPL-MCNC: 1.5 NG/DL (ref 0.8–1.8)
TRIGL SERPL-MCNC: 61 MG/DL
TSH SERPL-ACNC: 0.05 MIU/L
WBC # BLD AUTO: 2.8 THOUSAND/UL (ref 3.8–10.8)

## 2020-08-18 RX ORDER — LEVOTHYROXINE SODIUM 100 UG/1
100 TABLET ORAL
Qty: 30 TABLET | Refills: 1 | Status: SHIPPED | OUTPATIENT
Start: 2020-08-18 | End: 2020-10-08

## 2020-09-23 LAB
BASOPHILS # BLD AUTO: 31 CELLS/UL (ref 0–200)
BASOPHILS NFR BLD AUTO: 1.1 %
EOSINOPHIL # BLD AUTO: 171 CELLS/UL (ref 15–500)
EOSINOPHIL NFR BLD AUTO: 6.1 %
ERYTHROCYTE [DISTWIDTH] IN BLOOD BY AUTOMATED COUNT: 16.2 % (ref 11–15)
FERRITIN SERPL-MCNC: 8 NG/ML (ref 16–232)
HCT VFR BLD AUTO: 39.2 % (ref 35–45)
HGB BLD-MCNC: 11.6 G/DL (ref 11.7–15.5)
IRON SATN MFR SERPL: 11 % (CALC) (ref 16–45)
IRON SERPL-MCNC: 40 MCG/DL (ref 45–160)
LYMPHOCYTES # BLD AUTO: 974 CELLS/UL (ref 850–3900)
LYMPHOCYTES NFR BLD AUTO: 34.8 %
MCH RBC QN AUTO: 24 PG (ref 27–33)
MCHC RBC AUTO-ENTMCNC: 29.6 G/DL (ref 32–36)
MCV RBC AUTO: 81 FL (ref 80–100)
MONOCYTES # BLD AUTO: 392 CELLS/UL (ref 200–950)
MONOCYTES NFR BLD AUTO: 14 %
NEUTROPHILS # BLD AUTO: 1232 CELLS/UL (ref 1500–7800)
NEUTROPHILS NFR BLD AUTO: 44 %
PLATELET # BLD AUTO: 268 THOUSAND/UL (ref 140–400)
PMV BLD REES-ECKER: 10.6 FL (ref 7.5–12.5)
RBC # BLD AUTO: 4.84 MILLION/UL (ref 3.8–5.1)
T4 FREE SERPL-MCNC: 1.3 NG/DL (ref 0.8–1.8)
TIBC SERPL-MCNC: 357 MCG/DL (CALC) (ref 250–450)
TSH SERPL-ACNC: 0.29 MIU/L
WBC # BLD AUTO: 2.8 THOUSAND/UL (ref 3.8–10.8)

## 2020-09-24 ENCOUNTER — TELEPHONE (OUTPATIENT)
Dept: PRIMARY CARE | Facility: CLINIC | Age: 50
End: 2020-09-24

## 2020-09-24 DIAGNOSIS — E03.9 HYPOTHYROIDISM, UNSPECIFIED TYPE: Primary | ICD-10-CM

## 2020-09-24 DIAGNOSIS — D64.9 ANEMIA, UNSPECIFIED TYPE: ICD-10-CM

## 2020-09-25 ENCOUNTER — TELEPHONE (OUTPATIENT)
Dept: PRIMARY CARE | Facility: CLINIC | Age: 50
End: 2020-09-25

## 2020-09-25 NOTE — TELEPHONE ENCOUNTER
From [facility] Phone Center:    Pt returning phone call from [facility]. Pt stated it was regarding blood work results. Pt stated best time to reach her today would be  [id number]:45am-12:30pm.

## 2020-10-08 RX ORDER — LEVOTHYROXINE SODIUM 100 UG/1
TABLET ORAL
Qty: 30 TABLET | Refills: 1 | Status: SHIPPED | OUTPATIENT
Start: 2020-10-08 | End: 2020-11-09 | Stop reason: SDUPTHER

## 2020-10-29 ENCOUNTER — TRANSCRIBE ORDERS (OUTPATIENT)
Dept: SCHEDULING | Age: 50
End: 2020-10-29

## 2020-10-29 DIAGNOSIS — Z12.31 ENCOUNTER FOR SCREENING MAMMOGRAM FOR MALIGNANT NEOPLASM OF BREAST: Primary | ICD-10-CM

## 2020-11-09 RX ORDER — LEVOTHYROXINE SODIUM 100 UG/1
100 TABLET ORAL
Qty: 90 TABLET | Refills: 0 | Status: SHIPPED | OUTPATIENT
Start: 2020-11-09 | End: 2020-12-16 | Stop reason: SDUPTHER

## 2020-11-16 ENCOUNTER — HOSPITAL ENCOUNTER (OUTPATIENT)
Dept: RADIOLOGY | Age: 50
Discharge: HOME | End: 2020-11-16
Attending: OBSTETRICS & GYNECOLOGY
Payer: COMMERCIAL

## 2020-11-16 DIAGNOSIS — Z12.31 ENCOUNTER FOR SCREENING MAMMOGRAM FOR MALIGNANT NEOPLASM OF BREAST: ICD-10-CM

## 2020-11-16 PROCEDURE — 77063 BREAST TOMOSYNTHESIS BI: CPT

## 2020-12-20 LAB
BASOPHILS # BLD AUTO: 9 CELLS/UL (ref 0–200)
BASOPHILS NFR BLD AUTO: 0.3 %
EOSINOPHIL # BLD AUTO: 81 CELLS/UL (ref 15–500)
EOSINOPHIL NFR BLD AUTO: 2.8 %
ERYTHROCYTE [DISTWIDTH] IN BLOOD BY AUTOMATED COUNT: 16.6 % (ref 11–15)
FERRITIN SERPL-MCNC: 25 NG/ML (ref 16–232)
HCT VFR BLD AUTO: 37.7 % (ref 35–45)
HGB BLD-MCNC: 12.3 G/DL (ref 11.7–15.5)
IRON SATN MFR SERPL: 19 % (CALC) (ref 16–45)
IRON SERPL-MCNC: 56 MCG/DL (ref 45–160)
LYMPHOCYTES # BLD AUTO: 1105 CELLS/UL (ref 850–3900)
LYMPHOCYTES NFR BLD AUTO: 38.1 %
MCH RBC QN AUTO: 29.4 PG (ref 27–33)
MCHC RBC AUTO-ENTMCNC: 32.6 G/DL (ref 32–36)
MCV RBC AUTO: 90 FL (ref 80–100)
MONOCYTES # BLD AUTO: 345 CELLS/UL (ref 200–950)
MONOCYTES NFR BLD AUTO: 11.9 %
NEUTROPHILS # BLD AUTO: 1360 CELLS/UL (ref 1500–7800)
NEUTROPHILS NFR BLD AUTO: 46.9 %
PLATELET # BLD AUTO: 230 THOUSAND/UL (ref 140–400)
PMV BLD REES-ECKER: 10.7 FL (ref 7.5–12.5)
RBC # BLD AUTO: 4.19 MILLION/UL (ref 3.8–5.1)
T4 FREE SERPL-MCNC: 1.5 NG/DL (ref 0.8–1.8)
TIBC SERPL-MCNC: 295 MCG/DL (CALC) (ref 250–450)
TSH SERPL-ACNC: 0.16 MIU/L
WBC # BLD AUTO: 2.9 THOUSAND/UL (ref 3.8–10.8)

## 2020-12-20 RX ORDER — LIFITEGRAST 50 MG/ML
SOLUTION/ DROPS OPHTHALMIC
COMMUNITY
Start: 2020-12-11 | End: 2023-09-08 | Stop reason: ALTCHOICE

## 2021-02-14 ENCOUNTER — TELEPHONE (OUTPATIENT)
Dept: PRIMARY CARE | Facility: CLINIC | Age: 51
End: 2021-02-14

## 2021-02-14 RX ORDER — TIZANIDINE 4 MG/1
4 TABLET ORAL EVERY 8 HOURS PRN
Qty: 30 TABLET | Refills: 0 | Status: SHIPPED | OUTPATIENT
Start: 2021-02-14 | End: 2021-02-16 | Stop reason: ENTERED-IN-ERROR

## 2021-02-14 NOTE — TELEPHONE ENCOUNTER
Call into service   C/o back pain onset in past couple of days trying otc meds and topicals with limited success. Low back - spasm.  She has hx of similar in the past and after a few days of muscle relaxant it seemed to improve.  Advised would send in tizanidine 4 mg - gave her instructions on how to take and potential side effects- major being drowsiness. She will try this and follow up should it worsen or change

## 2021-02-16 ENCOUNTER — TELEPHONE (OUTPATIENT)
Dept: PRIMARY CARE | Facility: CLINIC | Age: 51
End: 2021-02-16

## 2021-02-16 RX ORDER — CYCLOBENZAPRINE HCL 5 MG
5 TABLET ORAL 2 TIMES DAILY PRN
Qty: 10 TABLET | Refills: 0 | Status: SHIPPED | OUTPATIENT
Start: 2021-02-16 | End: 2021-06-30 | Stop reason: ENTERED-IN-ERROR

## 2021-02-16 NOTE — TELEPHONE ENCOUNTER
Spoke with Sharon , she is not feeling better on the Tizanidine script that was prescribed her for. She is unable to take it due to it making her fall asleep for a long period of time, she has kids and has to work and unable to take it. She wants to know if she can be prescribed Cyclobenzaprine again since that worked for her last time. She has used lidocaine patches, stretches, and Aleve and nothing has really helped her.

## 2021-02-16 NOTE — TELEPHONE ENCOUNTER
Please let her know will send in small amt of flexeril and if she is not better she will need to come in for eval

## 2021-02-16 NOTE — TELEPHONE ENCOUNTER
pt requesting a call back, pt pulled back last thursday, called line sunday,  rx'd medication, the medication is making her fall asleep for 2 hours within 30 mins.  She is requesting to be rx'd something that will not make her drowsy.  She said when this happened previously she was rx  tiny white pills she could not remember the name

## 2021-02-16 NOTE — TELEPHONE ENCOUNTER
Spoke with Sharon , she is aware Flexeril was sent over to the pharmacy. She will give it until Friday and if no better she would need to come into the office

## 2021-02-16 NOTE — TELEPHONE ENCOUNTER
LM for Sharon to call the office, please find out what other medication she was on ? Was it flexeril (cyclobenzaprine) ?

## 2021-03-11 ENCOUNTER — TELEMEDICINE (OUTPATIENT)
Dept: PRIMARY CARE | Facility: CLINIC | Age: 51
End: 2021-03-11
Payer: COMMERCIAL

## 2021-03-11 DIAGNOSIS — M54.32 LEFT SIDED SCIATICA: Primary | ICD-10-CM

## 2021-03-11 PROCEDURE — 99213 OFFICE O/P EST LOW 20 MIN: CPT | Mod: 95 | Performed by: NURSE PRACTITIONER

## 2021-03-11 RX ORDER — METHYLPREDNISOLONE 4 MG/1
TABLET ORAL
Qty: 21 TABLET | Refills: 0 | Status: SHIPPED | OUTPATIENT
Start: 2021-03-11 | End: 2021-03-18

## 2021-03-11 ASSESSMENT — ENCOUNTER SYMPTOMS
WOUND: 0
SINUS PAIN: 0
SORE THROAT: 0
NAUSEA: 0
ABDOMINAL PAIN: 0
NEUROLOGICAL NEGATIVE: 1
PALPITATIONS: 0
MYALGIAS: 0
SINUS PRESSURE: 0
CHEST TIGHTNESS: 0
HEMATOLOGIC/LYMPHATIC NEGATIVE: 1
ENDOCRINE NEGATIVE: 1
DIAPHORESIS: 0
COUGH: 0
SHORTNESS OF BREATH: 0
AGITATION: 0
ALLERGIC/IMMUNOLOGIC NEGATIVE: 1
CONFUSION: 0
FATIGUE: 0
CHILLS: 0
EYES NEGATIVE: 1
FEVER: 0
BACK PAIN: 1
DIARRHEA: 0

## 2021-03-11 NOTE — PATIENT INSTRUCTIONS
As discussed, please start taking the medrol dose pack.  I have also ordered you physical therapy.  Please call [facility] in [place] to schedule.  [phone]    Thank you for choosing [facility].

## 2021-03-11 NOTE — PROGRESS NOTES
Verification of Patient Location:  The patient affirms they are currently located in the following state: [place]    Request for Consent:    Video Encounter   Alexei, my name is SYED Hope.  Before we proceed, can you please verify your identification by telling me your full name and date of birth?  Can you tell me who is in the room with you?    You and I are about to have a telemedicine check-in or visit because you have requested it.  This is a live video-conference.  I am a real person, speaking to you in real time.  There is no one else with me on the video-conference.  However, when we use ([employer]eth[employer], [employer], etc) it is important for you to know that the video-conference may not be secure or private.  I am not recording this conversation and I am asking you not to record it.  This telemedicine visit will be billed to your health insurance or you, if you are self-insured.  You understand you will be responsible for any copayments or coinsurances that apply to your telemedicine visit.  Communication platform used for this encounter:  Doximity     Before starting our telemedicine visit, I am required to get your consent for this virtual check-in or visit by telemedicine. Do you consent?      Patient Response to Request for Consent:  Yes      Visit Documentation:  Subjective     Patient ID: Sharon Morales is a 50 y.o. female.  1970      Pt reports numbness and tingling traveling down her left leg.  Pt admits to low back pain/injury one month ago.  Pt was given a muscle relaxer which helped but now pain is traveling down her left leg from her buttock area.        The following have been reviewed and updated as appropriate in this visit:  Tobacco  Allergies  Meds  Med Hx  Surg Hx  Fam Hx  Soc Hx      Review of Systems   Constitutional: Negative for chills, diaphoresis, fatigue and fever.   HENT: Negative for congestion, ear pain, sinus pressure, sinus pain, sneezing and sore throat.    Eyes:  Negative.    Respiratory: Negative for cough, chest tightness and shortness of breath.    Cardiovascular: Negative for chest pain and palpitations.   Gastrointestinal: Negative for abdominal pain, diarrhea and nausea.   Endocrine: Negative.    Genitourinary: Negative.    Musculoskeletal: Positive for back pain. Negative for myalgias.   Skin: Negative for rash and wound.   Allergic/Immunologic: Negative.    Neurological: Negative.    Hematological: Negative.    Psychiatric/Behavioral: Negative for agitation, behavioral problems, confusion, self-injury and suicidal ideas.         Assessment/Plan   Diagnoses and all orders for this visit:    Left sided sciatica (Primary)  -     methylPREDNISolone (MEDROL DOSEPACK) 4 mg tablet; Follow package directions.  -     Ambulatory referral to Physical Therapy; Future        Time Spent:  I spent 15 minutes on this date of service performing the following activities: obtaining history, entering orders, documenting, obtaining / reviewing records and independently reviewing study/studies.

## 2021-03-23 ENCOUNTER — TELEPHONE (OUTPATIENT)
Dept: PRIMARY CARE | Facility: CLINIC | Age: 51
End: 2021-03-23

## 2021-03-23 DIAGNOSIS — M54.40 ACUTE BILATERAL LOW BACK PAIN WITH SCIATICA, SCIATICA LATERALITY UNSPECIFIED: Primary | ICD-10-CM

## 2021-03-23 RX ORDER — IBUPROFEN 600 MG/1
600 TABLET ORAL 3 TIMES DAILY PRN
Qty: 20 TABLET | Refills: 1 | Status: SHIPPED | OUTPATIENT
Start: 2021-03-23 | End: 2021-04-24 | Stop reason: SDUPTHER

## 2021-03-23 NOTE — TELEPHONE ENCOUNTER
Spoke with patient regarding low back pain.  Pt agreeable to treat with NSAID - ibuprofen 3x per day and follow up with PT.

## 2021-03-23 NOTE — TELEPHONE ENCOUNTER
See below . You saw patient on 3/11 , she is requesting another around of Medrol Dose pack. Can't start PT until 4/2

## 2021-03-23 NOTE — TELEPHONE ENCOUNTER
Caller called an states pain came back would we be able to send in another round of methylPREDNISolone (MEDROL DOSEPACK) 4 mg tablet  She wont be able to start PT till 04/02/2021 if possible send to   Pharmacy:  [facility]/pharm[facility] #[id number] - [place], [place] - [address]   Phone:  [phone]   Fax:  [phone]   Address:  [address], [facility] [address]

## 2021-04-02 ENCOUNTER — HOSPITAL ENCOUNTER (OUTPATIENT)
Dept: PHYSICAL THERAPY | Age: 51
Setting detail: THERAPIES SERIES
Discharge: HOME | End: 2021-04-02
Attending: NURSE PRACTITIONER
Payer: COMMERCIAL

## 2021-04-02 DIAGNOSIS — M54.32 LEFT SIDED SCIATICA: ICD-10-CM

## 2021-04-02 PROCEDURE — 97162 PT EVAL MOD COMPLEX 30 MIN: CPT | Mod: GP | Performed by: PHYSICAL THERAPIST

## 2021-04-02 PROCEDURE — 97110 THERAPEUTIC EXERCISES: CPT | Mod: GP | Performed by: PHYSICAL THERAPIST

## 2021-04-02 PROCEDURE — 97140 MANUAL THERAPY 1/> REGIONS: CPT | Mod: GP | Performed by: PHYSICAL THERAPIST

## 2021-04-02 NOTE — PROGRESS NOTES
Referring Provider: By co-signing this Plan of Care (POC) either electronically or physically you agree to the following:    I have reviewed the the Plan of Care established by the therapist within this document and certify that the services are skilled and medically necessary. I have reviewed the plan and recommend that these services continue to meet the goals stated in this document.       EXTERNAL PROVIDER FAXING BACK:    PHYSICIAN SIGNATURE: __________________________________     DATE: ___________________  TIME: _____________    IMPORTANT:  If returning this Plan of Care by fax, please fax back ONLY the signature page.   _________________________________________________________________________      [facility] Fax: [phone]        PT EVALUATION FOR OUTPATIENT THERAPY    Patient: Sharon Morales    MRN: [id number]  : 1970 51 y.o.   Referring Physician: Neetu Jang CRNP  Date of Visit: 2021      Certification Dates:  21 through 21         Recommended Frequency & Duration:  2 times/week for up to 6 weeks     Diagnosis:   1. Left sided sciatica        Chief Complaints:   Chief Complaint   Patient presents with   • Dec ROM   • Pain   • Poor Symptom Management       Precautions: no known precautions/restrictions    Past Medical History:   Past Medical History:   Diagnosis Date   • Arthritis     Knees -sp B/L TKR   • At risk for venous thromboembolism (VTE) 2018   • Constipation 2018   • Hypothyroidism    • Kidney stones    • Last menstrual period (LMP) > 10 days ago 10/10/2018   • Leukopenia 2018   • Motion sickness    • Pain management 2018   • PONV (postoperative nausea and vomiting)    • Seasonal allergies    • Snores        Past Surgical History:   Past Surgical History:   Procedure Laterality Date   • BUNIONECTOMY Bilateral    •  SECTION      x 3   • JOINT REPLACEMENT Bilateral    • REVISION TOTAL KNEE ARTHROPLASTY Right 2018          LEARNING ASSESSMENT    Assessment completed: Yes    Learner name:  Sharon    Relationship: Patient    Learning Barriers:  Learning barriers: No Barriers    Preferred Language: English     Needed: No    Learning New Concepts: Listening      CO-LEARNER ASSESSMENT:    Completed: No            OBJECTIVE MEASUREMENTS/DATA:    Eval Assessment    Evaluation Assessment and Plan - 04/02/21 1012        Evaluation Assessment and Plan    Plan of Care reviewed and patient/family in agreement  Yes     System Pathology/Pathophysiology Noted  musculoskeletal     Functional Limitations in Following Categories (PT Eval)  home management;community/leisure     Rehab Potential/Prognosis  good, to achieve stated therapy goals     Problem List  decreased strength;pain;decreased ROM;decreased flexibility     Clinical Assessment  Sharon is a 51 year old female with complaints of L sided LBP and radiculopathy.  Pt. presents with limited AROM of the L/s and R knees. Pt. has an extensive history of BLE surgeries and as a result has significantly limited B knee flexion AROM; which are affecting pt.’s ability to perform functional tasks such as bending to lift objects and navigate stairs without compensation.  This is resulting in muscle imbalances and dysfunction around the lumbopelvic region.  Pt. has difficulty with navigating stairs, exercising at the gym, getting out of bed, and walking greater than 1 mile for exercise.  Pt. was educated on objective findings, current condition, POC, and prognosis.  Pt. was provided with initial HEP stretches that provided relief to pt.’s symptoms today. Pt. rates L sided LBP as 4/10 post session. Pt. will benefit from skilled physical therapy in order to address functional limitations, and to maximize function.     Planned Services  CPT 21255 Manual therapy;CPT 21021 Neuromuscular Reeducation;CPT 98891 Therapeutic activities;CPT 43639 Therapeutic exercises;CPT 76755 Electrical stimulation  UNATTENDED;CPT 29736 Hot/Cold Packs therapy         General Info   General Information - 04/02/21 0914        Session Details    Document Type  initial evaluation     Mode of Treatment  individual therapy;physical therapy     OP Specialty  Orthopedics        Time Calculation    Start Time  0900     Stop Time  0957     Time Calculation (min)  57 min        General Information    Onset of Illness/Injury or Date of Surgery  02/01/21     Referring Physician  Neetu Jang     Pertinent History of Current Functional Problem  Pt. complaints of L sided LBP and radiculopathy onset February 2021.  Pt. reports symptoms are insidious in nature, and are worsening since onset.  Pt. had a telehealth appt 1.5 months ago and was prescribed a steroid taper pack, which initially provided relief, however symptoms returned after medication use was ended.  Pt. reports difficulty getting out of bed, intermittent tingling and constant pain in the L side of the low back and L posterior thigh to the knee.  Pt. now uses 600 mg ibuprofen PRN for pain.  Pt. reports symptoms are better with movement, worse first thing in the morning.  Pt. swims and walks for exercise.  Pt. works from home.  Pt. 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     Existing Precautions/Restrictions  no known precautions/restrictions         Pain and Vitals   Pain/Vitals - 04/02/21 0901        Pain Assessment    Currently in pain  Yes     Preferred Pain Scale  number (Numeric Rating Pain Scale)     Pain: Body location  Back    L sided LBP to L posterior knee    Pain Rating (0-10): Pre Activity  7     Pain Rating (0-10): Activity  5     Pain Rating (0-10): Post Activity  3    post session    Frequency  constant     Quality  sharp        Pre Activity Vital Signs    BP  --    Attempted to take pt.'s pressure, but pt had a thick sweatshirt on without a  shirt under.       Pain Intervention    Intervention   PT IE, Manual and HEP     Post Intervention Comments  See assessment         Falls Assessment    Falls - 04/02/21 0920        Initial Falls Assessment    One or more falls in the last year  No         Living Environment    Living Environment - 04/02/21 0918        Living Environment    Lives With  spouse;child(erich), dependent     Living Arrangements  house     Living Environment Comment  2 stories with  full flight of stairs         PLOF:   Prior Level of Function - 04/02/21 0919        OTHER    Previous level of function  Pt. was independent with all functional tasks, however did have difficulty with navigating stairs 2/2 history of B knee pain and double knee replacements and revisions 5 years ago.         Type and Frequency:   PT - 04/02/21 0917        Physical Therapy    Physical Therapy  Ortho        PT Plan    Frequency of treatment  2 times/week     PT Duration  6 weeks     PT Cert From  04/02/21     PT Cert To  07/01/21     Date PT POC was sent to provider  04/02/21     Signed PT Plan of Care received?   No         Special Tests    Special Tests - 04/02/21 1000        Outcome measures tracking    Outcome measure used:  CHACHA: 28%        Special Tests    Neuromuscular Tests/Assessment  neurodynamic test        Neurodynamic Testing    Comment, Slump Test  Pt. unable to fully perform 2/2 limitation in B knee flexion AROM; noted severe stretch in B posterior knee.         ROM   Range of Motion - 04/02/21 1000        RIGHT: Lower Extremity AROM Assessment    Knee Flexion Deficit  75        LEFT: Lower Extremity AROM Assessment    Knee Flexion Deficit  80     Knee Extension Deficit  4        Lumbar AROM    Lumbar Flexion  --    25% limit: fear of movement, pulling in L/s, limited by knee    Lumbar Extension  --    75% limit: L sacral/glute p!; feels stuck    Lumbar Left Rotation  --    25% limit: increased p! in L LE    Lumbar Right Rotation  --    full; end  range p!        MMT   Manual Muscle Tests - 21 1010        RIGHT: Lower Extremity Manual Muscle Test Assessment    Hip Extension gross movement  (4/5) good    poor glute activation    Hip Abduction gross movement  (4-/5) good minus        LEFT: Lower Extremity Manual Muscle Test Assessment    Hip Extension gross movement  (4/5) good    poor glute activation    Hip Abduction gross movement  (4-/5) good minus        Additional Manual Muscle Tests    Additional MMT  4/5 TrA MMT         Palpation   Palpation - 21 1001        Palpation    Back Palpation   TTP L piriformis and glutes         Gait and Mobility   Gait and Mobility - 21 1011        Gait Training    Blanco, Gait  independent     Variable surfaces  Flat surface         Balance/Posture   Balance and Posture - 21 1045        Postural Deviations    Postural Deviations  low back     Comment, Postural Deviations  [place]we WB on RLE, L knee in mild flexion with standing, milf FHP, trunk shifted L, R lateral trunk shortening        Posture    Posture  postural deviations             Goals     • To be able to get out of bed with less pain (pt-stated)      STG  1. Patient will be able to perform bed mobility with 4/10 pain or less in 4 weeks.  2. Patient will improve CHACHA score by 10% in 4 weeks.  3. Patient will be able to tolerate functional tasks (bending and lifting) with 4/10 pain or less in 4 weeks.    LT. Patient will be able to perform transfers without pain in 6 weeks.  2. Patient will improve CHACHA score by 20% in 6 weeks.  3. Patient will be able to tolerate functional tasks without pain or ROM restrictions in 8 weeks.                  TREATMENT PLAN:      ORTHO PT FLOWSHEET    Exercises Current Session Time   MODALITIES- HEAT/ICE TOTAL TIME FOR SESSION Not performed   Heat    Ice/Vasocompression    MODALITIES - E-STIM TOTAL TIME FOR SESSION Not performed   E-stim/H-Wave    MODALITIES - US TOTAL TIME FOR SESSION Not performed    US    Iontophoresis    MODALITIES - TRACTION TOTAL TIME FOR SESSION Not performed   Mechanical Traction    THER ACT TOTAL TIME FOR SESSION 0-7 Minutes   Bed mobility    Transfer training    Body Mechanics/Work Training    Pt. education Pt. Education on current condition, POC, and prognosis.  Pt. Provided with initial HEP stretches (see media)   THER EX TOTAL TIME FOR SESSION 8-22 Minutes   CARDIOVASCULAR     Nu Step    Stationary Bike    Elliptical    Treadmill    UBE    STRENGTHENING    Supine ther-ex  PPT  Bridge  Clamshells  Hip extension (prone, standing)  Squatting      Seated ther-ex    Standing ther-ex    STRETCHING    LE stretching:  Hip flexor with strap  Hamstring stretch  Piriformis stretch  Prone on pillows (2, 1, 0)  Open book stretch See attached HEP   Other stretching    REPEATED MOVEMENTS    NEURO RE-ED TOTAL TIME FOR SESSION Not performed   COORDINATION    POSTURAL RE-ED       PRE-GAIT ACTIVITIES     BALANCE TRAINING     Sitting balance    Standing balance    MANUAL TOTAL TIME FOR SESSION 8-22 Minutes   Stretching    Mobilization     Massage:  Lumbosacral decompression, Piriformis release, Glute release    Taping             ASSESSMENT:    This 51 y.o. year old female presents to PT with above stated diagnosis. Physical Therapy evaluation reveals decreased strength, pain, decreased ROM, decreased flexibility resulting in home management, community/leisure limitations. Sharon Morales will benefit from skilled PT services to address limitation, work towards rehab and patient goals and maximize PLOF of chosen ADLs.     Planned Services: The patient's treatment will include CPT 64940 Manual therapy, CPT 01173 Neuromuscular Reeducation, CPT 51056 Therapeutic activities, CPT 79817 Therapeutic exercises, CPT 56409 Electrical stimulation UNATTENDED, CPT 80799 Hot/Cold Packs therapy, .

## 2021-04-06 ENCOUNTER — HOSPITAL ENCOUNTER (OUTPATIENT)
Dept: PHYSICAL THERAPY | Age: 51
Setting detail: THERAPIES SERIES
Discharge: HOME | End: 2021-04-06
Attending: NURSE PRACTITIONER
Payer: COMMERCIAL

## 2021-04-06 DIAGNOSIS — M54.32 LEFT SIDED SCIATICA: Primary | ICD-10-CM

## 2021-04-06 PROCEDURE — 97110 THERAPEUTIC EXERCISES: CPT | Mod: GP,CQ

## 2021-04-06 PROCEDURE — 97010 HOT OR COLD PACKS THERAPY: CPT | Mod: GP,CQ

## 2021-04-06 NOTE — PROGRESS NOTES
PT DAILY NOTE FOR OUTPATIENT THERAPY    Patient: Sharon Morales   MRN: [id number]  : 1970 51 y.o.  Referring Physician: Neetu Jang CRNP  Date of Visit: 2021    Certification Dates: 21 through 21    Diagnosis:   1. Left sided sciatica        Chief Complaints:       Precautions:   Existing Precautions/Restrictions: no known precautions/restrictions     TODAY'S VISIT    History/Vitals/Pain/Encounter Info - 21 1134        Injury History/Precautions/Daily Required Info    Onset of Illness/Injury or Date of Surgery  21     Referring Physician  Neetu Jang     Existing Precautions/Restrictions  no known precautions/restrictions     Pertinent History of Current Functional Problem  Pt. complaints of L sided LBP and radiculopathy onset 2021.  Pt. reports symptoms are insidious in nature, and are worsening since onset.  Pt. had a telehealth appt 1.5 months ago and was prescribed a steroid taper pack, which initially provided relief, however symptoms returned after medication use was ended.  Pt. reports difficulty getting out of bed, intermittent tingling and constant pain in the L side of the low back and L posterior thigh to the knee.  Pt. now uses 600 mg ibuprofen PRN for pain.  Pt. reports symptoms are better with movement, worse first thing in the morning.  Pt. swims and walks for exercise.  Pt. works from home.  Pt. 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     OP Specialty  Orthopedics     Document Type  daily treatment     Patient/Family/Caregiver Comments/Observations  Pt. reports good tolerance to HEP and IE last week.     Start Time  1100     Stop Time  1200     Time Calculation (min)  60 min     Patient reported fall since last visit  No        Pain Assessment    Currently in pain  Yes     Preferred Pain Scale  number (Numeric Rating Pain Scale)      Pain: Body location  Back     Pain Rating (0-10): Pre Activity  5     Pain Rating (0-10): Post Activity  5        Pain Intervention    Intervention   TE, manual     Post Intervention Comments  feels good         Daily Treatment Assessment and Plan - 04/06/21 1406        Daily Treatment Assessment and Plan    Progress toward goals  Progressing     Daily Outcome Summary  Pt. reports that she goes to the gym 5x per week and walks outdoors weather permitting and is scheduled for PT 2x per week. States knee ROM is a limiting factor. Iniitated session with HP x 10 min to LB in prone, NATACHA, PPU and manual LAD B, q-ped cat/cow, supine stretches, sidelying open book and standing ITB and GSS. Pt. reports good tolerance to TE and states she gets good relief with the LAD.     Plan and Recommendations  Continue to advance ROM and strength as able.             Today's Treatment:      ORTHO PT FLOWSHEET    Exercises Current Session Time   MODALITIES- HEAT/ICE TOTAL TIME FOR SESSION 8-22 Minutes   Heat To LB prone over 2 pillows   Ice/Vasocompression    MODALITIES - E-STIM TOTAL TIME FOR SESSION Not performed   E-stim/H-Wave    MODALITIES - US TOTAL TIME FOR SESSION Not performed   US    Iontophoresis    MODALITIES - TRACTION TOTAL TIME FOR SESSION Not performed   Mechanical Traction    THER ACT TOTAL TIME FOR SESSION Not performed   Bed mobility    Transfer training    Body Mechanics/Work Training    Pt. education Pt. Education on current condition, POC, and prognosis.  Pt. Provided with initial HEP stretches (see media)   THER EX TOTAL TIME FOR SESSION 38-52 Minutes   CARDIOVASCULAR     Nu Step    Stationary Bike    Elliptical    Treadmill    UBE    STRENGTHENING    Supine ther-ex  PPT  Bridge  Clamshells  Hip extension (prone, standing)  Squatting      Seated ther-ex    Standing ther-ex    STRETCHING    LE stretching:  Hip flexor with strap  Hamstring stretch  Piriformis stretch 1/2 pigeon  ppu  natacha  Open book stretch    4x20-30s  4 x 20-30s  4x20-30s  10x10s  3 min  5 x 10 s each   Stand GSS 4 x 20-30 s ea   Stand ITB 3x30s ea   q-ped cat/cow 10x10s       Other stretching    REPEATED MOVEMENTS    NEURO RE-ED TOTAL TIME FOR SESSION Not performed   COORDINATION    POSTURAL RE-ED       PRE-GAIT ACTIVITIES     BALANCE TRAINING     Sitting balance    Standing balance    MANUAL TOTAL TIME FOR SESSION 0-7 Minutes   Stretching LAD in prone   Mobilization     Massage:  Lumbosacral decompression, Piriformis release, Glute release    Taping

## 2021-04-06 NOTE — OP PT TREATMENT LOG
ORTHO PT FLOWSHEET    Exercises Current Session Time   MODALITIES- HEAT/ICE TOTAL TIME FOR SESSION 8-22 Minutes   Heat To LB prone over 2 pillows   Ice/Vasocompression    MODALITIES - E-STIM TOTAL TIME FOR SESSION Not performed   E-stim/H-Wave    MODALITIES - US TOTAL TIME FOR SESSION Not performed   US    Iontophoresis    MODALITIES - TRACTION TOTAL TIME FOR SESSION Not performed   Mechanical Traction    THER ACT TOTAL TIME FOR SESSION Not performed   Bed mobility    Transfer training    Body Mechanics/Work Training    Pt. education Pt. Education on current condition, POC, and prognosis.  Pt. Provided with initial HEP stretches (see media)   THER EX TOTAL TIME FOR SESSION 38-52 Minutes   CARDIOVASCULAR     Nu Step    Stationary Bike    Elliptical    Treadmill    UBE    STRENGTHENING    Supine ther-ex  PPT  Bridge  Clamshells  Hip extension (prone, standing)  Squatting      Seated ther-ex    Standing ther-ex    STRETCHING    LE stretching:  Hip flexor with strap  Hamstring stretch  Piriformis stretch 1/2 pigeon  ppu  natacha  Open book stretch   4x20-30s  4 x 20-30s  4x20-30s  10x10s  3 min  5 x 10 s each   Stand GSS 4 x 20-30 s ea   Stand ITB 3x30s ea   q-ped cat/cow 10x10s       Other stretching    REPEATED MOVEMENTS    NEURO RE-ED TOTAL TIME FOR SESSION Not performed   COORDINATION    POSTURAL RE-ED       PRE-GAIT ACTIVITIES     BALANCE TRAINING     Sitting balance    Standing balance    MANUAL TOTAL TIME FOR SESSION 0-7 Minutes   Stretching LAD in prone   Mobilization     Massage:  Lumbosacral decompression, Piriformis release, Glute release    Taping

## 2021-04-09 ENCOUNTER — HOSPITAL ENCOUNTER (OUTPATIENT)
Dept: PHYSICAL THERAPY | Age: 51
Setting detail: THERAPIES SERIES
Discharge: HOME | End: 2021-04-09
Attending: NURSE PRACTITIONER
Payer: COMMERCIAL

## 2021-04-09 DIAGNOSIS — M54.32 LEFT SIDED SCIATICA: Primary | ICD-10-CM

## 2021-04-09 PROCEDURE — 97010 HOT OR COLD PACKS THERAPY: CPT | Mod: GP | Performed by: PHYSICAL THERAPIST

## 2021-04-09 PROCEDURE — 97110 THERAPEUTIC EXERCISES: CPT | Mod: GP | Performed by: PHYSICAL THERAPIST

## 2021-04-09 PROCEDURE — 97140 MANUAL THERAPY 1/> REGIONS: CPT | Mod: GP | Performed by: PHYSICAL THERAPIST

## 2021-04-09 NOTE — OP PT TREATMENT LOG
ORTHO PT FLOWSHEET    Exercises Current Session Time   MODALITIES- HEAT/ICE TOTAL TIME FOR SESSION 8-22 Minutes   Heat To LB prone over 2 pillows   Ice/Vasocompression    MODALITIES - E-STIM TOTAL TIME FOR SESSION Not performed   E-stim/H-Wave    MODALITIES - US TOTAL TIME FOR SESSION Not performed   US    Iontophoresis    MODALITIES - TRACTION TOTAL TIME FOR SESSION Not performed   Mechanical Traction    THER ACT TOTAL TIME FOR SESSION Not performed   Bed mobility    Transfer training    Body Mechanics/Work Training    Pt. education Pt. Education on current condition, POC, and prognosis.  Pt. Provided with initial HEP stretches (see media)   THER EX TOTAL TIME FOR SESSION 38-52 Minutes   CARDIOVASCULAR     Nu Step    Stationary Bike    Elliptical    Treadmill    UBE    STRENGTHENING    Supine ther-ex  PPT  Bridge  Clamshells  Hip extension (prone, standing)  Squatting       With G TB around knees (bridge with hip abduction) 2 x10 5s hold  GTB 5s x 15 each   Seated ther-ex    Standing ther-ex    STRETCHING    LE stretching:  Hip flexor with strap  Hamstring stretch  Piriformis stretch 1/2 pigeon  ppu  natacha  Open book stretch       4x20-30s       Stand GSS    Stand ITB    q-ped cat/cow 10x10s       Other stretching Tennis ball release L QL and L/s paraspinal in sidelying and supine   REPEATED MOVEMENTS    NEURO RE-ED TOTAL TIME FOR SESSION Not performed   COORDINATION    POSTURAL RE-ED       PRE-GAIT ACTIVITIES     BALANCE TRAINING     Sitting balance    Standing balance    MANUAL TOTAL TIME FOR SESSION 8-22 Minutes   Stretching B leg pull, L QL and L/s paraspinal release and stretch   Mobilization     Massage:  Lumbosacral decompression, Piriformis release, Glute release    Taping

## 2021-04-09 NOTE — PROGRESS NOTES
PT DAILY NOTE FOR OUTPATIENT THERAPY    Patient: Sharon Morales   MRN: [id number]  : 1970 51 y.o.  Referring Physician: Neetu Jang CRNP  Date of Visit: 2021    Certification Dates: 21 through 21    Diagnosis:   1. Left sided sciatica        Chief Complaints:       Precautions:   Existing Precautions/Restrictions: no known precautions/restrictions     TODAY'S VISIT    History/Vitals/Pain/Encounter Info - 21 1116        Injury History/Precautions/Daily Required Info    Onset of Illness/Injury or Date of Surgery  21     Referring Physician  Neetu Jang     Existing Precautions/Restrictions  no known precautions/restrictions     Pertinent History of Current Functional Problem  Pt. complaints of L sided LBP and radiculopathy onset 2021.  Pt. reports symptoms are insidious in nature, and are worsening since onset.  Pt. had a telehealth appt 1.5 months ago and was prescribed a steroid taper pack, which initially provided relief, however symptoms returned after medication use was ended.  Pt. reports difficulty getting out of bed, intermittent tingling and constant pain in the L side of the low back and L posterior thigh to the knee.  Pt. now uses 600 mg ibuprofen PRN for pain.  Pt. reports symptoms are better with movement, worse first thing in the morning.  Pt. swims and walks for exercise.  Pt. works from home.  Pt. 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     OP Specialty  Orthopedics     Document Type  daily treatment     Patient/Family/Caregiver Comments/Observations  Pt. reports having 10/10 L sided LBP and leg pain last night.  Pt. reports performing tasks around her home such as vacuuming yesterday. Pt. reports taking pain medication prior to therapy session.     Start Time  1103     Stop Time  1202     Time Calculation (min)  59 min      Patient reported fall since last visit  No        Pain Assessment    Currently in pain  Yes     Preferred Pain Scale  number (Numeric Rating Pain Scale)     Pain: Body location  Leg;Back    LBP and L leg    Pain Rating (0-10): Pre Activity  8        Pain Intervention    Intervention   MHP, MT, TE     Post Intervention Comments  See assessment         Daily Treatment Assessment and Plan - 04/09/21 1203        Daily Treatment Assessment and Plan    Progress toward goals  Slower than expected     Daily Outcome Summary  Pt. tolerated treatment without increased pain with strengthening progression.  Session initiated with MHP prone on 2 pillows.  Pillows were gradually removed to prone positioning.  MT focused on reduction of soft tissue restrictions around L posterior-lateral trunk, which pt. tolerated well.  Pt. limited 2/2 decreased B knee AROM, there stretching was modified. Pt. exercises were progressed in strengthing and stability; pt. required cueing to maintain trunk stability with bridging.  Pt. encouraged to use G TB at home with bridging and clamshell as part of HEP. Pt. noted decreased pain 5/10 post session.     Plan and Recommendations  Continue as per POC, add dead bug           OBJECTIVE DATA TAKEN TODAY:    None taken    Today's Treatment:      ORTHO PT FLOWSHEET    Exercises Current Session Time   MODALITIES- HEAT/ICE TOTAL TIME FOR SESSION 8-22 Minutes   Heat To LB prone over 2 pillows   Ice/Vasocompression    MODALITIES - E-STIM TOTAL TIME FOR SESSION Not performed   E-stim/H-Wave    MODALITIES - US TOTAL TIME FOR SESSION Not performed   US    Iontophoresis    MODALITIES - TRACTION TOTAL TIME FOR SESSION Not performed   Mechanical Traction    THER ACT TOTAL TIME FOR SESSION Not performed   Bed mobility    Transfer training    Body Mechanics/Work Training    Pt. education Pt. Education on current condition, POC, and prognosis.  Pt. Provided with initial HEP stretches (see media)   THER EX TOTAL TIME  FOR SESSION 38-52 Minutes   CARDIOVASCULAR     Nu Step    Stationary Bike    Elliptical    Treadmill    UBE    STRENGTHENING    Supine ther-ex  PPT  Bridge  Clamshells  Hip extension (prone, standing)  Squatting       With G TB around knees (bridge with hip abduction) 2 x10 5s hold  GTB 5s x 15 each   Seated ther-ex    Standing ther-ex    STRETCHING    LE stretching:  Hip flexor with strap  Hamstring stretch  Piriformis stretch 1/2 pigeon  ppu  natacha  Open book stretch       4x20-30s       Stand GSS    Stand ITB    q-ped cat/cow 10x10s       Other stretching Tennis ball release L QL and L/s paraspinal in sidelying and supine   REPEATED MOVEMENTS    NEURO RE-ED TOTAL TIME FOR SESSION Not performed   COORDINATION    POSTURAL RE-ED       PRE-GAIT ACTIVITIES     BALANCE TRAINING     Sitting balance    Standing balance    MANUAL TOTAL TIME FOR SESSION 8-22 Minutes   Stretching B leg pull, L QL and L/s paraspinal release and stretch   Mobilization     Massage:  Lumbosacral decompression, Piriformis release, Glute release    Taping

## 2021-04-11 ENCOUNTER — IMMUNIZATION (OUTPATIENT)
Dept: IMMUNIZATION | Facility: CLINIC | Age: 51
End: 2021-04-11

## 2021-04-13 ENCOUNTER — HOSPITAL ENCOUNTER (OUTPATIENT)
Dept: PHYSICAL THERAPY | Age: 51
Setting detail: THERAPIES SERIES
Discharge: HOME | End: 2021-04-13
Attending: NURSE PRACTITIONER
Payer: COMMERCIAL

## 2021-04-13 DIAGNOSIS — M54.32 LEFT SIDED SCIATICA: Primary | ICD-10-CM

## 2021-04-13 PROCEDURE — 97110 THERAPEUTIC EXERCISES: CPT | Mod: GP,CQ

## 2021-04-13 PROCEDURE — 97010 HOT OR COLD PACKS THERAPY: CPT | Mod: GP,CQ

## 2021-04-13 NOTE — OP PT TREATMENT LOG
ORTHO PT FLOWSHEET    Exercises Current Session Time   MODALITIES- HEAT/ICE TOTAL TIME FOR SESSION 8-22 Minutes   Heat To LB prone over 2 pillows   Ice/Vasocompression    MODALITIES - E-STIM TOTAL TIME FOR SESSION Not performed   E-stim/H-Wave    MODALITIES - US TOTAL TIME FOR SESSION Not performed   US    Iontophoresis    MODALITIES - TRACTION TOTAL TIME FOR SESSION Not performed   Mechanical Traction    THER ACT TOTAL TIME FOR SESSION Not performed   Bed mobility    Transfer training    Body Mechanics/Work Training    Pt. education Pt. Education on current condition, POC, and prognosis.  Pt. Provided with initial HEP stretches (see media)   THER EX TOTAL TIME FOR SESSION 38-52 Minutes   CARDIOVASCULAR     Nu Step    Stationary Bike    Elliptical    Treadmill    UBE    STRENGTHENING    Supine ther-ex  PPT  Bridge  Clamshells  Hip extension (prone, standing)  Squatting  LTR with p-ball  Bridge with feet on p-ball       With G TB around knees (bridge with hip abduction) 2 x10 5s hold          10x10s  5s 2x10   Seated ther-ex    Standing ther-ex    STRETCHING    LE stretching:  Hip flexor with strap  Hamstring stretch  Piriformis stretch 1/2 pigeon  ppu  natacha  Open book stretch       4x20-30s    3 min  5 x 10 s each   Stand GSS    Stand ITB    q-ped cat/cow 10x10s   Thoracic stretch w/ p-ball standing 10x10s   Other stretching Tennis ball release L QL and L/s paraspinal in sidelying and supine-NO   REPEATED MOVEMENTS    NEURO RE-ED TOTAL TIME FOR SESSION Not performed   COORDINATION    POSTURAL RE-ED       PRE-GAIT ACTIVITIES     BALANCE TRAINING     Sitting balance    Standing balance    MANUAL TOTAL TIME FOR SESSION 5 min (billed with TE)   Stretching LAD B 5 x 30 sec   Mobilization     Massage:  Lumbosacral decompression, Piriformis release, Glute release    Taping

## 2021-04-15 ENCOUNTER — HOSPITAL ENCOUNTER (OUTPATIENT)
Dept: PHYSICAL THERAPY | Age: 51
Setting detail: THERAPIES SERIES
Discharge: HOME | End: 2021-04-15
Attending: NURSE PRACTITIONER
Payer: COMMERCIAL

## 2021-04-15 DIAGNOSIS — M54.32 LEFT SIDED SCIATICA: Primary | ICD-10-CM

## 2021-04-15 PROCEDURE — 97010 HOT OR COLD PACKS THERAPY: CPT | Mod: GP,CQ

## 2021-04-15 PROCEDURE — 97110 THERAPEUTIC EXERCISES: CPT | Mod: GP,CQ

## 2021-04-15 NOTE — OP PT TREATMENT LOG
ORTHO PT FLOWSHEET    Exercises Current Session Time   MODALITIES- HEAT/ICE TOTAL TIME FOR SESSION 8-22 Minutes   Heat To LB prone over 2 pillows   Ice/Vasocompression    MODALITIES - E-STIM TOTAL TIME FOR SESSION Not performed   E-stim/H-Wave    MODALITIES - US TOTAL TIME FOR SESSION Not performed   US    Iontophoresis    MODALITIES - TRACTION TOTAL TIME FOR SESSION Not performed   Mechanical Traction    THER ACT TOTAL TIME FOR SESSION Not performed   Bed mobility    Transfer training    Body Mechanics/Work Training    Pt. education Pt. Education on current condition, POC, and prognosis.  Pt. Provided with initial HEP stretches (see media)   THER EX TOTAL TIME FOR SESSION 38-52 Minutes   CARDIOVASCULAR     Nu Step    Stationary Bike    Elliptical    Treadmill    UBE    STRENGTHENING    Supine ther-ex  PPT  Bridge  Clamshells  Hip extension (prone, standing)  Squatting  LTR with p-ball  Bridge with feet on p-ball       With G TB around knees (bridge with hip abduction) 2 x10 5s hold  Yes GTB x 20 each        10x10s  5s 2x10   Prone quad stretch 3x30s   Seated ther-ex    Standing ther-ex    STRETCHING    LE stretching:  Hip flexor with strap  Hamstring stretch  Piriformis stretch 1/2 pigeon  ppu  natacha  Open book stretch       4x20-30s    3 min  5 x 10 s each   Stand GSS    Stand ITB    q-ped cat/cow 10x10s   Thoracic stretch w/ p-ball standing 10x10s   Other stretching Tennis ball release L QL and L/s paraspinal in sidelying and supine-NO   REPEATED MOVEMENTS    NEURO RE-ED TOTAL TIME FOR SESSION Not performed   COORDINATION    POSTURAL RE-ED       PRE-GAIT ACTIVITIES     BALANCE TRAINING     Sitting balance    Standing balance    MANUAL TOTAL TIME FOR SESSION 5 min (billed with TE)   Stretching LAD B 5 x 30 sec   Mobilization     Massage:  Lumbosacral decompression, Piriformis release, Glute release    Taping

## 2021-04-15 NOTE — PROGRESS NOTES
PT DAILY NOTE FOR OUTPATIENT THERAPY    Patient: Sharon Morales   MRN: [id number]  : 1970 51 y.o.  Referring Physician: Neetu Jang CRNP  Date of Visit: 4/15/2021    Certification Dates: 21 through 21    Diagnosis:   1. Left sided sciatica        Chief Complaints:       Precautions:   Existing Precautions/Restrictions: no known precautions/restrictions     TODAY'S VISIT    History/Vitals/Pain/Encounter Info - 04/15/21 1147        Injury History/Precautions/Daily Required Info    Onset of Illness/Injury or Date of Surgery  21     Referring Physician  Neetu Jang     Existing Precautions/Restrictions  no known precautions/restrictions     Pertinent History of Current Functional Problem  Pt. complaints of L sided LBP and radiculopathy onset 2021.  Pt. reports symptoms are insidious in nature, and are worsening since onset.  Pt. had a telehealth appt 1.5 months ago and was prescribed a steroid taper pack, which initially provided relief, however symptoms returned after medication use was ended.  Pt. reports difficulty getting out of bed, intermittent tingling and constant pain in the L side of the low back and L posterior thigh to the knee.  Pt. now uses 600 mg ibuprofen PRN for pain.  Pt. reports symptoms are better with movement, worse first thing in the morning.  Pt. swims and walks for exercise.  Pt. works from home.  Pt. 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     OP Specialty  Orthopedics     Document Type  daily treatment     Patient/Family/Caregiver Comments/Observations  Pt. reports she is having shooting pain into L calf today.     Start Time  1105     Stop Time  1200     Time Calculation (min)  55 min     Patient reported fall since last visit  No        Pain Assessment    Currently in pain  Yes     Preferred Pain Scale  number (Numeric Rating  Pain Scale)     Pain Side/Orientation  left     Pain: Body location  Leg     Pain Rating (0-10): Pre Activity  5     Pain Rating (0-10): Post Activity  3        Pain Intervention    Intervention   HP, TE, LAD     Post Intervention Comments  feels better         Daily Treatment Assessment and Plan - 04/15/21 1203        Daily Treatment Assessment and Plan    Progress toward goals  Slower than expected     Daily Outcome Summary  COntinued with HP x 10 min in prone and MONALISA 3 of the 10 min. Continued ROM and strength exs as noted on log.  Added prone quad stretch 3x30 sec each. Manual LAD with B LE 5 x 30 sec. Pt. to monitor symptoms at end of each day to correlate ADL's to pain at night.     Plan and Recommendations  Continue to advance toward goals as able without increasing pain.             Today's Treatment:    ORTHO PT FLOWSHEET    Exercises Current Session Time   MODALITIES- HEAT/ICE TOTAL TIME FOR SESSION 8-22 Minutes   Heat To LB prone over 2 pillows   Ice/Vasocompression    MODALITIES - E-STIM TOTAL TIME FOR SESSION Not performed   E-stim/H-Wave    MODALITIES - US TOTAL TIME FOR SESSION Not performed   US    Iontophoresis    MODALITIES - TRACTION TOTAL TIME FOR SESSION Not performed   Mechanical Traction    THER ACT TOTAL TIME FOR SESSION Not performed   Bed mobility    Transfer training    Body Mechanics/Work Training    Pt. education Pt. Education on current condition, POC, and prognosis.  Pt. Provided with initial HEP stretches (see media)   THER EX TOTAL TIME FOR SESSION 38-52 Minutes   CARDIOVASCULAR     Nu Step    Stationary Bike    Elliptical    Treadmill    UBE    STRENGTHENING    Supine ther-ex  PPT  Bridge  Clamshells  Hip extension (prone, standing)  Squatting  LTR with p-ball  Bridge with feet on p-ball       With G TB around knees (bridge with hip abduction) 2 x10 5s hold  Yes GTB x 20 each        10x10s  5s 2x10   Prone quad stretch 3x30s   Seated ther-ex    Standing ther-ex    STRETCHING    LE  stretching:  Hip flexor with strap  Hamstring stretch  Piriformis stretch 1/2 pigeon  ppu  natacha  Open book stretch       4x20-30s    3 min  5 x 10 s each   Stand GSS    Stand ITB    q-ped cat/cow 10x10s   Thoracic stretch w/ p-ball standing 10x10s   Other stretching Tennis ball release L QL and L/s paraspinal in sidelying and supine-NO   REPEATED MOVEMENTS    NEURO RE-ED TOTAL TIME FOR SESSION Not performed   COORDINATION    POSTURAL RE-ED       PRE-GAIT ACTIVITIES     BALANCE TRAINING     Sitting balance    Standing balance    MANUAL TOTAL TIME FOR SESSION 5 min (billed with TE)   Stretching LAD B 5 x 30 sec   Mobilization     Massage:  Lumbosacral decompression, Piriformis release, Glute release    Taping

## 2021-04-19 ENCOUNTER — HOSPITAL ENCOUNTER (OUTPATIENT)
Dept: PHYSICAL THERAPY | Age: 51
Setting detail: THERAPIES SERIES
Discharge: HOME | End: 2021-04-19
Attending: NURSE PRACTITIONER
Payer: COMMERCIAL

## 2021-04-19 DIAGNOSIS — M54.32 LEFT SIDED SCIATICA: Primary | ICD-10-CM

## 2021-04-19 PROCEDURE — 97110 THERAPEUTIC EXERCISES: CPT | Mod: GP,CQ

## 2021-04-19 NOTE — OP PT TREATMENT LOG
ORTHO PT FLOWSHEET    Exercises Current Session Time   MODALITIES- HEAT/ICE TOTAL TIME FOR SESSION    Heat To LB prone over 2 pillows   Ice/Vasocompression    MODALITIES - E-STIM TOTAL TIME FOR SESSION    E-stim/H-Wave    MODALITIES - US TOTAL TIME FOR SESSION    US    Iontophoresis    MODALITIES - TRACTION TOTAL TIME FOR SESSION    Mechanical Traction    THER ACT TOTAL TIME FOR SESSION    Bed mobility    Transfer training    Body Mechanics/Work Training    Pt. education Pt. Education on current condition, POC, and prognosis.  Pt. Provided with initial HEP stretches (see media)   THER EX TOTAL TIME FOR SESSION 55 min   CARDIOVASCULAR     Nu Step    Stationary Bike Yes x 10 min with HP to LB   Elliptical    Treadmill    UBE    STRENGTHENING    Supine ther-ex  PPT  Bridge  Clamshells  Hip extension (prone, standing)  Squatting  LTR with p-ball  Bridge with feet on p-ball       With G TB around knees (bridge with hip abduction) 2 x10 5s hold-no   GTB x 20 each-no  standing 2x10-yes      10x10s-Yes  5s 2x10-yes   Prone quad stretch [id number]   Seated ther-ex    Standing ther-ex    STRETCHING    LE stretching:  GSS  Hip flexor with strap  Hamstring stretch  Piriformis stretch 1/2 pigeon  ppu  natacha  Open book stretch   At slant board 3 x 30s-yes  4x20s -yes  4x20s -yes  4x20-30s- yes  no  3 min-no  5 x 10 s each- yes       Stand ITB    q-ped cat/cow 10x10s-yes   Thoracic stretch w/ p-ball standing 10x10s-yes   Other stretching Tennis ball release L QL and L/s paraspinal in sidelying and supine-NO   REPEATED MOVEMENTS    NEURO RE-ED TOTAL TIME FOR SESSION Not performed   COORDINATION    POSTURAL RE-ED       PRE-GAIT ACTIVITIES     BALANCE TRAINING     Sitting balance    Standing balance    MANUAL TOTAL TIME FOR SESSION 5 min (billed with TE)   Stretching LAD B 5 x 30 sec   Mobilization     Massage:  Lumbosacral decompression, Piriformis release, Glute release    Taping

## 2021-04-19 NOTE — PROGRESS NOTES
PT DAILY NOTE FOR OUTPATIENT THERAPY    Patient: Sharon Morales   MRN: [id number]  : 1970 51 y.o.  Referring Physician: Neetu Jang CRNP  Date of Visit: 2021    Certification Dates: 21 through 21    Diagnosis:   1. Left sided sciatica        Chief Complaints:       Precautions:   Existing Precautions/Restrictions: no known precautions/restrictions     TODAY'S VISIT    History/Vitals/Pain/Encounter Info - 21 1127        Injury History/Precautions/Daily Required Info    Onset of Illness/Injury or Date of Surgery  21     Referring Physician  Neetu Jang     Existing Precautions/Restrictions  no known precautions/restrictions     Pertinent History of Current Functional Problem  Pt. complaints of L sided LBP and radiculopathy onset 2021.  Pt. reports symptoms are insidious in nature, and are worsening since onset.  Pt. had a telehealth appt 1.5 months ago and was prescribed a steroid taper pack, which initially provided relief, however symptoms returned after medication use was ended.  Pt. reports difficulty getting out of bed, intermittent tingling and constant pain in the L side of the low back and L posterior thigh to the knee.  Pt. now uses 600 mg ibuprofen PRN for pain.  Pt. reports symptoms are better with movement, worse first thing in the morning.  Pt. swims and walks for exercise.  Pt. works from home.  Pt. 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     OP Specialty  Orthopedics     Document Type  daily treatment     Patient/Family/Caregiver Comments/Observations  Pt. reports she had an easy weekend at a spa with her friends. Did a 2 mile hike and only had pain in knees not in LB.     Start Time  1105     Stop Time  1200     Time Calculation (min)  55 min     Patient reported fall since last visit  No        Pain Assessment    Currently in  pain  Yes     Preferred Pain Scale  number (Numeric Rating Pain Scale)     Pain: Body location  Back     Pain Rating (0-10): Pre Activity  5     Pain Rating (0-10): Post Activity  5        Pain Intervention    Intervention   HP, TE, LAD     Post Intervention Comments  pain in L calf         Daily Treatment Assessment and Plan - 04/19/21 1128        Daily Treatment Assessment and Plan    Progress toward goals  Slower than expected     Daily Outcome Summary  Initiated session with HP to LB on recumbant bike x 10 min. Review of stretches as noted on activity log. Added GSS at slant board 3 x30s. Continued prone LAD to B LE for symptom relief.     Plan and Recommendations  Next visit continue warm up on bike with HP to LB and add core stab.             Today's Treatment:    ORTHO PT FLOWSHEET    Exercises Current Session Time   MODALITIES- HEAT/ICE TOTAL TIME FOR SESSION    Heat To LB prone over 2 pillows   Ice/Vasocompression    MODALITIES - E-STIM TOTAL TIME FOR SESSION    E-stim/H-Wave    MODALITIES - US TOTAL TIME FOR SESSION    US    Iontophoresis    MODALITIES - TRACTION TOTAL TIME FOR SESSION    Mechanical Traction    THER ACT TOTAL TIME FOR SESSION    Bed mobility    Transfer training    Body Mechanics/Work Training    Pt. education Pt. Education on current condition, POC, and prognosis.  Pt. Provided with initial HEP stretches (see media)   THER EX TOTAL TIME FOR SESSION 55 min   CARDIOVASCULAR     Nu Step    Stationary Bike Yes x 10 min with HP to LB   Elliptical    Treadmill    UBE    STRENGTHENING    Supine ther-ex  PPT  Bridge  Clamshells  Hip extension (prone, standing)  Squatting  LTR with p-ball  Bridge with feet on p-ball       With G TB around knees (bridge with hip abduction) 2 x10 5s hold-no   GTB x 20 each-no  standing 2x10-yes      10x10s-Yes  5s 2x10-yes   Prone quad stretch [id number]   Seated ther-ex    Standing ther-ex    STRETCHING    LE stretching:  GSS  Hip flexor with strap  Hamstring  stretch  Piriformis stretch 1/2 pigeon  ppu  natacha  Open book stretch   At slant board 3 x 30s-yes  4x20s -yes  4x20s -yes  4x20-30s- yes  no  3 min-no  5 x 10 s each- yes       Stand ITB    q-ped cat/cow 10x10s-yes   Thoracic stretch w/ p-ball standing 10x10s-yes   Other stretching Tennis ball release L QL and L/s paraspinal in sidelying and supine-NO   REPEATED MOVEMENTS    NEURO RE-ED TOTAL TIME FOR SESSION Not performed   COORDINATION    POSTURAL RE-ED       PRE-GAIT ACTIVITIES     BALANCE TRAINING     Sitting balance    Standing balance    MANUAL TOTAL TIME FOR SESSION 5 min (billed with TE)   Stretching LAD B 5 x 30 sec   Mobilization     Massage:  Lumbosacral decompression, Piriformis release, Glute release    Taping

## 2021-04-23 ENCOUNTER — HOSPITAL ENCOUNTER (OUTPATIENT)
Dept: PHYSICAL THERAPY | Age: 51
Setting detail: THERAPIES SERIES
Discharge: HOME | End: 2021-04-23
Attending: NURSE PRACTITIONER
Payer: COMMERCIAL

## 2021-04-23 DIAGNOSIS — M54.32 LEFT SIDED SCIATICA: Primary | ICD-10-CM

## 2021-04-23 PROCEDURE — 97110 THERAPEUTIC EXERCISES: CPT | Mod: GP,CQ

## 2021-04-23 PROCEDURE — 97010 HOT OR COLD PACKS THERAPY: CPT | Mod: GP,CQ

## 2021-04-23 NOTE — PROGRESS NOTES
PT DAILY NOTE FOR OUTPATIENT THERAPY    Patient: Sharon Morales   MRN: [id number]  : 1970 51 y.o.  Referring Physician: Neetu Jang CRNP  Date of Visit: 2021    Certification Dates: 21 through 21    Diagnosis:   1. Left sided sciatica        Chief Complaints:       Precautions:   Existing Precautions/Restrictions: no known precautions/restrictions     TODAY'S VISIT    History/Vitals/Pain/Encounter Info - 21 1023        Injury History/Precautions/Daily Required Info    Onset of Illness/Injury or Date of Surgery  21     Referring Physician  Neetu Jang     Existing Precautions/Restrictions  no known precautions/restrictions     Pertinent History of Current Functional Problem  Pt. complaints of L sided LBP and radiculopathy onset 2021.  Pt. reports symptoms are insidious in nature, and are worsening since onset.  Pt. had a telehealth appt 1.5 months ago and was prescribed a steroid taper pack, which initially provided relief, however symptoms returned after medication use was ended.  Pt. reports difficulty getting out of bed, intermittent tingling and constant pain in the L side of the low back and L posterior thigh to the knee.  Pt. now uses 600 mg ibuprofen PRN for pain.  Pt. reports symptoms are better with movement, worse first thing in the morning.  Pt. swims and walks for exercise.  Pt. works from home.  Pt. 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     OP Specialty  Orthopedics     Document Type  daily treatment     Patient/Family/Caregiver Comments/Observations  Pt. reports that she swam this morning and has been tracking end of day symptoms and feels that when she does PT exs (HEP) at end of day she has more pain.     Start Time  1004     Stop Time  1100     Time Calculation (min)  56 min     Patient reported fall since last visit  No         Pain Assessment    Currently in pain  Yes     Preferred Pain Scale  number (Numeric Rating Pain Scale)     Pain Rating (0-10): Pre Activity  4     Pain Rating (0-10): Post Activity  4         Daily Treatment Assessment and Plan - 04/23/21 1023        Daily Treatment Assessment and Plan    Progress toward goals  Slower than expected     Daily Outcome Summary  Initiated session with HP to LB in prone x 8 min (3 of 8 min on elbows). Review of prone progression exs and stretches. Added hip ext x 10 over edge of mat and instructed patient in self lumbar traction with foam roll 5 x 20 sec. Pt. to decreased HEP to every other day at home in effort to manage symptoms. Pt. currently works out every day  HEP, swimming, weights and walking.     Plan and Recommendations  HP if needed next visit with active warm up.               Today's Treatment:    ORTHO PT FLOWSHEET    Exercises Current Session Time   MODALITIES- HEAT/ICE TOTAL TIME FOR SESSION yes   Heat To LB prone over 2 pillows   Ice/Vasocompression    MODALITIES - E-STIM TOTAL TIME FOR SESSION    E-stim/H-Wave    MODALITIES - US TOTAL TIME FOR SESSION    US    Iontophoresis    MODALITIES - TRACTION TOTAL TIME FOR SESSION    Mechanical Traction    THER ACT TOTAL TIME FOR SESSION    Bed mobility    Transfer training    Body Mechanics/Work Training    Pt. education Pt. Education on current condition, POC, and prognosis.  Pt. Provided with initial HEP stretches (see media)   THER EX TOTAL TIME FOR SESSION 52 min   CARDIOVASCULAR     Nu Step    Stationary Bike Yes x 10 min    Elliptical    Treadmill    UBE    STRENGTHENING    Supine ther-ex  PPT  Bridge  Clamshells  Hip extension (prone, standing)  Squatting  LTR with p-ball  Bridge with feet on p-ball       With G TB around knees (bridge with hip abduction) 2 x10 5s hold-no   GTB x 20 each-no  standing 1x10-yes      10x10s-Yes  5s 2x10-yes   Prone quad stretch 3x30s-yes   Seated ther-ex    Standing ther-ex    STRETCHING     LE stretching:  GSS  Hip flexor with strap  Hamstring stretch  Piriformis stretch 1/2 pigeon  ppu  natacha  Open book stretch   At slant board 3 x 30s-yes  4x20s -yes  4x20s -yes  4x20-30s- yes  yes  3 min-yes  5 x 10 s each- yes       Stand ITB    q-ped cat/cow 10x10s-yes   Thoracic stretch w/ p-ball standing 10x10s-yes   Other stretching Tennis ball release L QL and L/s paraspinal in sidelying and supine-NO   REPEATED MOVEMENTS    NEURO RE-ED TOTAL TIME FOR SESSION Not performed   COORDINATION    POSTURAL RE-ED       PRE-GAIT ACTIVITIES     BALANCE TRAINING     Sitting balance    Standing balance    MANUAL TOTAL TIME FOR SESSION 5 min (billed with TE)   Stretching LAD B 5 x 30 sec-no   Instructed patient in self lumbar traction with foam roller 5 x 30s   Mobilization     Massage:  Lumbosacral decompression, Piriformis release, Glute release    Taping

## 2021-04-23 NOTE — OP PT TREATMENT LOG
ORTHO PT FLOWSHEET    Exercises Current Session Time   MODALITIES- HEAT/ICE TOTAL TIME FOR SESSION yes   Heat To LB prone over 2 pillows   Ice/Vasocompression    MODALITIES - E-STIM TOTAL TIME FOR SESSION    E-stim/H-Wave    MODALITIES - US TOTAL TIME FOR SESSION    US    Iontophoresis    MODALITIES - TRACTION TOTAL TIME FOR SESSION    Mechanical Traction    THER ACT TOTAL TIME FOR SESSION    Bed mobility    Transfer training    Body Mechanics/Work Training    Pt. education Pt. Education on current condition, POC, and prognosis.  Pt. Provided with initial HEP stretches (see media)   THER EX TOTAL TIME FOR SESSION 52 min   CARDIOVASCULAR     Nu Step    Stationary Bike Yes x 10 min    Elliptical    Treadmill    UBE    STRENGTHENING    Supine ther-ex  PPT  Bridge  Clamshells  Hip extension (prone, standing)  Squatting  LTR with p-ball  Bridge with feet on p-ball       With G TB around knees (bridge with hip abduction) 2 x10 5s hold-no   GTB x 20 each-no  standing 1x10-yes      10x10s-Yes  5s 2x10-yes   Prone quad stretch 3x30s-yes   Seated ther-ex    Standing ther-ex    STRETCHING    LE stretching:  GSS  Hip flexor with strap  Hamstring stretch  Piriformis stretch 1/2 pigeon  ppu  natacha  Open book stretch   At slant board 3 x 30s-yes  4x20s -yes  4x20s -yes  4x20-30s- yes  yes  3 min-yes  5 x 10 s each- yes       Stand ITB    q-ped cat/cow 10x10s-yes   Thoracic stretch w/ p-ball standing 10x10s-yes   Other stretching Tennis ball release L QL and L/s paraspinal in sidelying and supine-NO   REPEATED MOVEMENTS    NEURO RE-ED TOTAL TIME FOR SESSION Not performed   COORDINATION    POSTURAL RE-ED       PRE-GAIT ACTIVITIES     BALANCE TRAINING     Sitting balance    Standing balance    MANUAL TOTAL TIME FOR SESSION 5 min (billed with TE)   Stretching LAD B 5 x 30 sec-no   Instructed patient in self lumbar traction with foam roller 5 x 30s   Mobilization     Massage:  Lumbosacral decompression, Piriformis release, Glute release     Taping

## 2021-04-24 DIAGNOSIS — M54.40 ACUTE BILATERAL LOW BACK PAIN WITH SCIATICA, SCIATICA LATERALITY UNSPECIFIED: ICD-10-CM

## 2021-04-26 RX ORDER — CONJUGATED ESTROGENS 0.62 MG/G
0.5 CREAM VAGINAL DAILY
Qty: 30 G | Refills: 5 | Status: SHIPPED | OUTPATIENT
Start: 2021-04-26 | End: 2021-07-07 | Stop reason: SDUPTHER

## 2021-04-27 ENCOUNTER — HOSPITAL ENCOUNTER (OUTPATIENT)
Dept: PHYSICAL THERAPY | Age: 51
Setting detail: THERAPIES SERIES
Discharge: HOME | End: 2021-04-27
Attending: NURSE PRACTITIONER
Payer: COMMERCIAL

## 2021-04-27 DIAGNOSIS — M54.32 LEFT SIDED SCIATICA: Primary | ICD-10-CM

## 2021-04-27 PROCEDURE — 97010 HOT OR COLD PACKS THERAPY: CPT | Mod: GP,CQ

## 2021-04-27 PROCEDURE — 97110 THERAPEUTIC EXERCISES: CPT | Mod: GP,CQ

## 2021-04-27 RX ORDER — IBUPROFEN 600 MG/1
600 TABLET ORAL 3 TIMES DAILY PRN
Qty: 20 TABLET | Refills: 1 | Status: SHIPPED | OUTPATIENT
Start: 2021-04-27 | End: 2021-08-03

## 2021-04-27 NOTE — OP PT TREATMENT LOG
ORTHO PT FLOWSHEET    Exercises Current Session Time   MODALITIES- HEAT/ICE TOTAL TIME FOR SESSION yes   Heat To LB prone over 2 pillows   Ice/Vasocompression    MODALITIES - E-STIM TOTAL TIME FOR SESSION    E-stim/H-Wave    MODALITIES - US TOTAL TIME FOR SESSION    US    Iontophoresis    MODALITIES - TRACTION TOTAL TIME FOR SESSION    Mechanical Traction    THER ACT TOTAL TIME FOR SESSION    Bed mobility    Transfer training    Body Mechanics/Work Training    Pt. education Pt. Education on current condition, POC, and prognosis.  Pt. Provided with initial HEP stretches (see media)   THER EX TOTAL TIME FOR SESSION 52 min   CARDIOVASCULAR     Nu Step    Stationary Bike no x 10 min    Elliptical    Treadmill    UBE Yes 5 min retro L 1   STRENGTHENING    Supine ther-ex  PPT  Bridge  Clamshells  Hip extension (prone, standing)  Squatting  LTR with p-ball  Bridge with feet on p-ball  Bird dog over p-ball     With G TB around knees (bridge with hip abduction) 2 x10 5s hold-no   GTB x 20 each-no  standing 1x10-yes      10x10s-Yes  5s 2x10-yes  x10- yes   Prone quad stretch 3x30s-yes   Seated ther-ex    Standing ther-ex    STRETCHING    LE stretching:  GSS  Hip flexor with strap  Hamstring stretch  Piriformis stretch 1/2 pigeon  ppu  natacha  Open book stretch   At slant board 3 x 30s-yes  4x20s -yes  4x20s -yes  4x20-30s- yes  yes  3 min-yes  5 x 10 s each- yes   Long sitting HS/GSS Yes 1 x30s   Stand ITB Yes 1x30s   q-ped cat/cow [id number]   Thoracic stretch w/ p-ball standing 10x10s-yes   Other stretching Tennis ball release L QL and L/s paraspinal in sidelying and supine-NO   REPEATED MOVEMENTS    NEURO RE-ED TOTAL TIME FOR SESSION Not performed   COORDINATION    POSTURAL RE-ED       PRE-GAIT ACTIVITIES     BALANCE TRAINING     Sitting balance    Standing balance    MANUAL TOTAL TIME FOR SESSION    Stretching LAD B 5 x 30 sec-no   Instructed patient in self lumbar traction with foam roller 5 x 30s   Mobilization      Massage:  Lumbosacral decompression, Piriformis release, Glute release    Taping

## 2021-04-27 NOTE — PROGRESS NOTES
PT DAILY NOTE FOR OUTPATIENT THERAPY    Patient: Sharon Morales   MRN: [id number]  : 1970 51 y.o.  Referring Physician: Neetu Jang CRNP  Date of Visit: 2021    Certification Dates: 21 through 21    Diagnosis:   1. Left sided sciatica        Chief Complaints:       Precautions:   Existing Precautions/Restrictions: no known precautions/restrictions     TODAY'S VISIT    History/Vitals/Pain/Encounter Info - 21 1133        Injury History/Precautions/Daily Required Info    Onset of Illness/Injury or Date of Surgery  21     Referring Physician  Neetu Jang     Existing Precautions/Restrictions  no known precautions/restrictions     Pertinent History of Current Functional Problem  Pt. complaints of L sided LBP and radiculopathy onset 2021.  Pt. reports symptoms are insidious in nature, and are worsening since onset.  Pt. had a telehealth appt 1.5 months ago and was prescribed a steroid taper pack, which initially provided relief, however symptoms returned after medication use was ended.  Pt. reports difficulty getting out of bed, intermittent tingling and constant pain in the L side of the low back and L posterior thigh to the knee.  Pt. now uses 600 mg ibuprofen PRN for pain.  Pt. reports symptoms are better with movement, worse first thing in the morning.  Pt. swims and walks for exercise.  Pt. works from home.  Pt. 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     OP Specialty  Orthopedics     Document Type  daily treatment     Patient/Family/Caregiver Comments/Observations  Pt. reports she biked at the gym today and has been cutting back on HEP to 3 x per week with good results.     Start Time  1105     Stop Time  1205     Time Calculation (min)  60 min     Patient reported fall since last visit  No        Pain Assessment    Currently in pain  Yes      Preferred Pain Scale  number (Numeric Rating Pain Scale)     Pain Side/Orientation  left     Pain: Body location  Leg     Pain Rating (0-10): Pre Activity  4     Pain Rating (0-10): Post Activity  0        Pain Intervention    Intervention   HP,TE     Post Intervention Comments  feel good         Daily Treatment Assessment and Plan - 04/27/21 6257        Daily Treatment Assessment and Plan    Progress toward goals  Slower than expected     Daily Outcome Summary  Continued with HP in prone x 8 min with MONALISA x 5 min.PPU x 10 and flexibility exs and core stab exs as noted on log. Added longsit HSS/GSS, bird dog over p-ball and UBE. Deferred manual LAD as pt. reprots feeling good at end of TE     Plan and Recommendations  Next visit resume LAD prn and advance core stab as able with extension bias.               Today's Treatment:    ORTHO PT FLOWSHEET    Exercises Current Session Time   MODALITIES- HEAT/ICE TOTAL TIME FOR SESSION yes   Heat To LB prone over 2 pillows   Ice/Vasocompression    MODALITIES - E-STIM TOTAL TIME FOR SESSION    E-stim/H-Wave    MODALITIES - US TOTAL TIME FOR SESSION    US    Iontophoresis    MODALITIES - TRACTION TOTAL TIME FOR SESSION    Mechanical Traction    THER ACT TOTAL TIME FOR SESSION    Bed mobility    Transfer training    Body Mechanics/Work Training    Pt. education Pt. Education on current condition, POC, and prognosis.  Pt. Provided with initial HEP stretches (see media)   THER EX TOTAL TIME FOR SESSION 52 min   CARDIOVASCULAR     Nu Step    Stationary Bike no x 10 min    Elliptical    Treadmill    UBE Yes 5 min retro L 1   STRENGTHENING    Supine ther-ex  PPT  Bridge  Clamshells  Hip extension (prone, standing)  Squatting  LTR with p-ball  Bridge with feet on p-ball  Bird dog over p-ball     With G TB around knees (bridge with hip abduction) 2 x10 5s hold-no   GTB x 20 each-no  standing 1x10-yes      10x10s-Yes  5s 2x10-yes  x10- yes   Prone quad stretch 3x30s-yes   Seated ther-ex     Standing ther-ex    STRETCHING    LE stretching:  GSS  Hip flexor with strap  Hamstring stretch  Piriformis stretch 1/2 pigeon  ppu  natacha  Open book stretch   At slant board 3 x 30s-yes  4x20s -yes  4x20s -yes  4x20-30s- yes  yes  3 min-yes  5 x 10 s each- yes   Long sitting HS/GSS Yes 1 x30s   Stand ITB Yes 1x30s   q-ped cat/cow [id number]   Thoracic stretch w/ p-ball standing 10x10s-yes   Other stretching Tennis ball release L QL and L/s paraspinal in sidelying and supine-NO   REPEATED MOVEMENTS    NEURO RE-ED TOTAL TIME FOR SESSION Not performed   COORDINATION    POSTURAL RE-ED       PRE-GAIT ACTIVITIES     BALANCE TRAINING     Sitting balance    Standing balance    MANUAL TOTAL TIME FOR SESSION    Stretching LAD B 5 x 30 sec-no   Instructed patient in self lumbar traction with foam roller 5 x 30s   Mobilization     Massage:  Lumbosacral decompression, Piriformis release, Glute release    Taping

## 2021-04-28 ENCOUNTER — TELEPHONE (OUTPATIENT)
Dept: PRIMARY CARE | Facility: CLINIC | Age: 51
End: 2021-04-28

## 2021-04-28 NOTE — TELEPHONE ENCOUNTER
Transferred to [place] to [facility] for clarification on directions for premarin.

## 2021-04-30 ENCOUNTER — HOSPITAL ENCOUNTER (OUTPATIENT)
Dept: PHYSICAL THERAPY | Age: 51
Setting detail: THERAPIES SERIES
Discharge: HOME | End: 2021-04-30
Attending: NURSE PRACTITIONER
Payer: COMMERCIAL

## 2021-04-30 DIAGNOSIS — M54.32 LEFT SIDED SCIATICA: Primary | ICD-10-CM

## 2021-04-30 PROCEDURE — 97110 THERAPEUTIC EXERCISES: CPT | Mod: GP,CQ

## 2021-04-30 NOTE — PROGRESS NOTES
PT DAILY NOTE FOR OUTPATIENT THERAPY    Patient: Sharon Morales   MRN: [id number]  : 1970 51 y.o.  Referring Physician: Neetu Jang CRNP  Date of Visit: 2021    Certification Dates: 21 through 21    Diagnosis:   1. Left sided sciatica        Chief Complaints:       Precautions:   Existing Precautions/Restrictions: no known precautions/restrictions     TODAY'S VISIT    History/Vitals/Pain/Encounter Info - 21 1154        Injury History/Precautions/Daily Required Info    Onset of Illness/Injury or Date of Surgery  21     Referring Physician  Neetu Jang     Existing Precautions/Restrictions  no known precautions/restrictions     Pertinent History of Current Functional Problem  Pt. complaints of L sided LBP and radiculopathy onset 2021.  Pt. reports symptoms are insidious in nature, and are worsening since onset.  Pt. had a telehealth appt 1.5 months ago and was prescribed a steroid taper pack, which initially provided relief, however symptoms returned after medication use was ended.  Pt. reports difficulty getting out of bed, intermittent tingling and constant pain in the L side of the low back and L posterior thigh to the knee.  Pt. now uses 600 mg ibuprofen PRN for pain.  Pt. reports symptoms are better with movement, worse first thing in the morning.  Pt. swims and walks for exercise.  Pt. works from home.  Pt. 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     OP Specialty  Orthopedics     Document Type  daily treatment     Patient/Family/Caregiver Comments/Observations  Pt. reprots she swam this morning and has noticed positive response with decreasing days of HEP.     Start Time  1100     Stop Time  1201     Time Calculation (min)  61 min     Patient reported fall since last visit  No        Pain Assessment    Currently in pain  Yes     Preferred  Pain Scale  number (Numeric Rating Pain Scale)     Pain: Body location  Back     Pain Rating (0-10): Pre Activity  4     Pain Rating (0-10): Post Activity  2        Pain Intervention    Intervention   HP, TE     Post Intervention Comments  feels better         Daily Treatment Assessment and Plan - 04/30/21 1204        Daily Treatment Assessment and Plan    Progress toward goals  Slower than expected     Daily Outcome Summary  Discussed at length with patient that limitations with B Knee ROM could be contributing to hip, back and ankle pain. Pt. has decreased HEP for strengthening to 3x/week with good results. Pt. to consider adding pilates 1-2x per week to improve core stab. Active warm up today on bike with HP at LB and review of ROM and strength exs for LE, LB and core as noted on activity log.  Added rows x 3 with pt. seated on P-ball to incorporated core strength with upper body. Pt. reports 50% decrease in symptoms at end of session.     Plan and Recommendations  Continue LAD and continue to advance core stb with LE and UE strength training.             Today's Treatment:    ORTHO PT FLOWSHEET    Exercises Current Session Time   MODALITIES- HEAT/ICE TOTAL TIME FOR SESSION N/A   Heat To LB on bike   Ice/Vasocompression    MODALITIES - E-STIM TOTAL TIME FOR SESSION    E-stim/H-Wave    MODALITIES - US TOTAL TIME FOR SESSION    US    Iontophoresis    MODALITIES - TRACTION TOTAL TIME FOR SESSION    Mechanical Traction    THER ACT TOTAL TIME FOR SESSION    Bed mobility    Transfer training    Body Mechanics/Work Training    Pt. education Pt. Education on current condition, POC, and prognosis.  Pt. Provided with initial HEP stretches (see media)   THER EX TOTAL TIME FOR SESSION 62 min   CARDIOVASCULAR     Nu Step    Stationary Bike yes x 10 min with HP   Elliptical    Treadmill    UBE NO- 5 min retro L 1   STRENGTHENING    Supine ther-ex  PPT  Bridge  Clamshells  Hip extension (prone, standing)  Squatting  LTR with  p-ball  Bridge with feet on p-ball  Bird dog over p-ball     With G TB around knees (bridge with hip abduction) 2 x10 5s hold-no   GTB x 20 each-no  Over p-ball 1x10-yes      10x10s-Yes  5s 2x10-yes  x10- yes   Prone quad stretch 3x30s-yes   Seated ther-ex  Rows On p-ball  High  Mid  Low       Blue tube  x10  x10  x10   Standing ther-ex    STRETCHING    LE stretching:  GSS  Hip flexor with strap  Hamstring stretch  Piriformis stretch 1/2 pigeon  ppu  natacha  Open book stretch   At [employer] board 3 x 30s-yes  4x20s -yes  4x20s -yes  4x20-30s- yes  no  3 min-yes  5 x 10 s each- no   Long sitting HS/GSS Yes 1 x30s   Stand ITB Yes 1x30s   q-ped cat/cow [id number]   Thoracic stretch w/ p-ball standing 10x10s-yes   Other stretching Tennis ball release L QL and L/s paraspinal in sidelying and supine-NO   REPEATED MOVEMENTS    NEURO RE-ED TOTAL TIME FOR SESSION Not performed   COORDINATION    POSTURAL RE-ED       PRE-GAIT ACTIVITIES     BALANCE TRAINING     Sitting balance    Standing balance    MANUAL TOTAL TIME FOR SESSION    Stretching LAD B 5 x 30 sec-no   Instructed patient in self lumbar traction with foam roller 5 x 30s   Mobilization     Massage:  Lumbosacral decompression, Piriformis release, Glute release    Taping

## 2021-04-30 NOTE — OP PT TREATMENT LOG
ORTHO PT FLOWSHEET    Exercises Current Session Time   MODALITIES- HEAT/ICE TOTAL TIME FOR SESSION N/A   Heat To LB on bike   Ice/Vasocompression    MODALITIES - E-STIM TOTAL TIME FOR SESSION    E-stim/H-Wave    MODALITIES - US TOTAL TIME FOR SESSION    US    Iontophoresis    MODALITIES - TRACTION TOTAL TIME FOR SESSION    Mechanical Traction    THER ACT TOTAL TIME FOR SESSION    Bed mobility    Transfer training    Body Mechanics/Work Training    Pt. education Pt. Education on current condition, POC, and prognosis.  Pt. Provided with initial HEP stretches (see media)   THER EX TOTAL TIME FOR SESSION 62 min   CARDIOVASCULAR     Nu Step    Stationary Bike yes x 10 min with HP   Elliptical    Treadmill    UBE NO- 5 min retro L 1   STRENGTHENING    Supine ther-ex  PPT  Bridge  Clamshells  Hip extension (prone, standing)  Squatting  LTR with p-ball  Bridge with feet on p-ball  Bird dog over p-ball     With G TB around knees (bridge with hip abduction) 2 x10 5s hold-no   GTB x 20 each-no  Over p-ball 1x10-yes      10x10s-Yes  5s 2x10-yes  x10- yes   Prone quad stretch 3x30s-yes   Seated ther-ex  Rows On p-ball  High  Mid  Low       Blue tube  x10  x10  x10   Standing ther-ex    STRETCHING    LE stretching:  GSS  Hip flexor with strap  Hamstring stretch  Piriformis stretch 1/2 pigeon  ppu  natacha  Open book stretch   At slant board 3 x 30s-yes  4x20s -yes  4x20s -yes  4x20-30s- yes  no  3 min-yes  5 x 10 s each- no   Long sitting HS/GSS Yes 1 x30s   Stand ITB Yes 1x30s   q-ped cat/cow [id number]   Thoracic stretch w/ p-ball standing 10x10s-yes   Other stretching Tennis ball release L QL and L/s paraspinal in sidelying and supine-NO   REPEATED MOVEMENTS    NEURO RE-ED TOTAL TIME FOR SESSION Not performed   COORDINATION    POSTURAL RE-ED       PRE-GAIT ACTIVITIES     BALANCE TRAINING     Sitting balance    Standing balance    MANUAL TOTAL TIME FOR SESSION    Stretching LAD B 5 x 30 sec-no   Instructed patient in self lumbar  traction with foam roller 5 x 30s   Mobilization     Massage:  Lumbosacral decompression, Piriformis release, Glute release    Taping

## 2021-05-04 ENCOUNTER — HOSPITAL ENCOUNTER (OUTPATIENT)
Dept: PHYSICAL THERAPY | Age: 51
Setting detail: THERAPIES SERIES
Discharge: HOME | End: 2021-05-04
Attending: NURSE PRACTITIONER
Payer: COMMERCIAL

## 2021-05-04 DIAGNOSIS — M54.32 LEFT SIDED SCIATICA: Primary | ICD-10-CM

## 2021-05-04 PROCEDURE — 97110 THERAPEUTIC EXERCISES: CPT | Mod: GP,CQ

## 2021-05-04 NOTE — PROGRESS NOTES
PT DAILY NOTE FOR OUTPATIENT THERAPY    Patient: Sharon Morales   MRN: [id number]  : 1970 51 y.o.  Referring Physician: Neetu Jang CRNP  Date of Visit: 2021    Certification Dates: 21 through 21    Diagnosis:   1. Left sided sciatica        Chief Complaints:       Precautions:   Existing Precautions/Restrictions: no known precautions/restrictions     TODAY'S VISIT    History/Vitals/Pain/Encounter Info - 21 1120        Injury History/Precautions/Daily Required Info    Onset of Illness/Injury or Date of Surgery  21     Referring Physician  Neetu Jang     Existing Precautions/Restrictions  no known precautions/restrictions     Pertinent History of Current Functional Problem  Pt. complaints of L sided LBP and radiculopathy onset 2021.  Pt. reports symptoms are insidious in nature, and are worsening since onset.  Pt. had a telehealth appt 1.5 months ago and was prescribed a steroid taper pack, which initially provided relief, however symptoms returned after medication use was ended.  Pt. reports difficulty getting out of bed, intermittent tingling and constant pain in the L side of the low back and L posterior thigh to the knee.  Pt. now uses 600 mg ibuprofen PRN for pain.  Pt. reports symptoms are better with movement, worse first thing in the morning.  Pt. swims and walks for exercise.  Pt. works from home.  Pt. 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     OP Specialty  Orthopedics     Document Type  daily treatment     Patient/Family/Caregiver Comments/Observations  Pt. reports her LB is feeling a lot better rates pain at best 2/10 and worst 8/10 on on average 2-4/10.     Start Time  1100     Stop Time  1200     Time Calculation (min)  60 min     Patient reported fall since last visit  No        Pain Assessment    Currently in pain  Yes      Preferred Pain Scale  number (Numeric Rating Pain Scale)     Pain: Body location  Back     Pain Rating (0-10): Pre Activity  2     Pain Rating (0-10): Post Activity  2        Pain Intervention    Intervention   TE     Post Intervention Comments  no change         Daily Treatment Assessment and Plan - 05/04/21 1420        Daily Treatment Assessment and Plan    Progress toward goals  Slower than expected     Daily Outcome Summary  Pt. reports she woke up with pain at 4 am and took prescription ibuprofen. Measured progress toward STG today and pt. has met all STG.     Plan and Recommendations  Continue POC to meet LTG and continue core and LE strength and LB/LE ROM.               Today's Treatment:    ORTHO PT FLOWSHEET    Exercises Current Session Time   MODALITIES- HEAT/ICE TOTAL TIME FOR SESSION N/A   Heat To LB on bike   Ice/Vasocompression    MODALITIES - E-STIM TOTAL TIME FOR SESSION    E-stim/H-Wave    MODALITIES - US TOTAL TIME FOR SESSION    US    Iontophoresis    MODALITIES - TRACTION TOTAL TIME FOR SESSION    Mechanical Traction    THER ACT TOTAL TIME FOR SESSION 10 min   ROM karey and Oswestry Knee flex R 88 L 85 lumb ROM WNL Oswestry 13/50   Bed mobility    Transfer training    Body Mechanics/Work Training    Pt. education Pt. Education on current condition, POC, and prognosis.  Pt. Provided with initial HEP stretches (see media)   THER EX TOTAL TIME FOR SESSION 50 min   CARDIOVASCULAR     Nu Step    Stationary Bike NO x 10 min with HP   Elliptical    Treadmill    UBE NO- 5 min retro L 1   STRENGTHENING    Supine ther-ex  PPT  Bridge  Clamshells  Hip extension (prone, standing)  Squatting  LTR with p-ball  Bridge with feet on p-ball  Bird dog over p-ball     With G TB around knees (bridge with hip abduction) 2 x10 5s hold-no   GTB x 20 each-no  At edge of mat 2x10-yes      10x10s-Yes  5s 2x10-yes  x10- yes   Prone quad stretch 3x30s-yes   Seated ther-ex  Rows On p-ball  High  Mid  Low     NO  Blue  tube  x10  x10  x10   Standing ther-ex    STRETCHING    LE stretching:  GSS  Squats on slant board  Hip flexor with strap  Hamstring stretch  Piriformis stretch 1/2 pigeon  ppu  natacha  Open book stretch   At slant board 3 x 30s-yes  Incline/decline x10  4x20s -yes  4x20s -yes  4x20-30s- yes  10x10s yes  3 min-yes  5 x 10 s each- yes   Long sitting HS/GSS Yes 1 x30s   Stand ITB Yes 1x30s   q-ped cat/cow 10x10s-yes   Thoracic stretch w/ p-ball standing 10x10s-yes   Other stretching Tennis ball release L QL and L/s paraspinal in sidelying and supine-NO   REPEATED MOVEMENTS    NEURO RE-ED TOTAL TIME FOR SESSION Not performed   COORDINATION    POSTURAL RE-ED       PRE-GAIT ACTIVITIES     BALANCE TRAINING     Sitting balance    Standing balance    MANUAL TOTAL TIME FOR SESSION    Stretching LAD B 5 x 30 sec-no   Instructed patient in self lumbar traction with foam roller 5 x 30s   Mobilization     Massage:  Lumbosacral decompression, Piriformis release, Glute release    Taping

## 2021-05-04 NOTE — OP PT TREATMENT LOG
ORTHO PT FLOWSHEET    Exercises Current Session Time   MODALITIES- HEAT/ICE TOTAL TIME FOR SESSION N/A   Heat To LB on bike   Ice/Vasocompression    MODALITIES - E-STIM TOTAL TIME FOR SESSION    E-stim/H-Wave    MODALITIES - US TOTAL TIME FOR SESSION    US    Iontophoresis    MODALITIES - TRACTION TOTAL TIME FOR SESSION    Mechanical Traction    THER ACT TOTAL TIME FOR SESSION 10 min   ROM karey and Oswestry Knee flex R 88 L 85 lumb ROM WNL Oswestry 13/50   Bed mobility    Transfer training    Body Mechanics/Work Training    Pt. education Pt. Education on current condition, POC, and prognosis.  Pt. Provided with initial HEP stretches (see media)   THER EX TOTAL TIME FOR SESSION 50 min   CARDIOVASCULAR     Nu Step    Stationary Bike NO x 10 min with HP   Elliptical    Treadmill    UBE NO- 5 min retro L 1   STRENGTHENING    Supine ther-ex  PPT  Bridge  Clamshells  Hip extension (prone, standing)  Squatting  LTR with p-ball  Bridge with feet on p-ball  Bird dog over p-ball     With G TB around knees (bridge with hip abduction) 2 x10 5s hold-no   GTB x 20 each-no  At edge of mat 2x10-yes      10x10s-Yes  5s 2x10-yes  x10- yes   Prone quad stretch 3x30s-yes   Seated ther-ex  Rows On p-ball  High  Mid  Low     NO  Blue tube  x10  x10  x10   Standing ther-ex    STRETCHING    LE stretching:  GSS  Squats on slant board  Hip flexor with strap  Hamstring stretch  Piriformis stretch 1/2 pigeon  ppu  natacha  Open book stretch   At slant board 3 x 30s-yes  Incline/decline x10  4x20s -yes  4x20s -yes  4x20-30s- yes  10x10s yes  3 min-yes  5 x 10 s each- yes   Long sitting HS/GSS Yes 1 x30s   Stand ITB Yes 1x30s   q-ped cat/cow 10x10s-yes   Thoracic stretch w/ p-ball standing 10x10s-yes   Other stretching Tennis ball release L QL and L/s paraspinal in sidelying and supine-NO   REPEATED MOVEMENTS    NEURO RE-ED TOTAL TIME FOR SESSION Not performed   COORDINATION    POSTURAL RE-ED       PRE-GAIT ACTIVITIES     BALANCE TRAINING      Sitting balance    Standing balance    MANUAL TOTAL TIME FOR SESSION    Stretching LAD B 5 x 30 sec-no   Instructed patient in self lumbar traction with foam roller 5 x 30s   Mobilization     Massage:  Lumbosacral decompression, Piriformis release, Glute release    Taping

## 2021-05-07 ENCOUNTER — HOSPITAL ENCOUNTER (OUTPATIENT)
Dept: PHYSICAL THERAPY | Age: 51
Setting detail: THERAPIES SERIES
Discharge: HOME | End: 2021-05-07
Attending: NURSE PRACTITIONER
Payer: COMMERCIAL

## 2021-05-07 DIAGNOSIS — M54.32 LEFT SIDED SCIATICA: Primary | ICD-10-CM

## 2021-05-07 PROCEDURE — 97110 THERAPEUTIC EXERCISES: CPT | Mod: GP,CQ

## 2021-05-07 NOTE — OP PT TREATMENT LOG
ORTHO PT FLOWSHEET    Exercises Current Session Time   MODALITIES- HEAT/ICE TOTAL TIME FOR SESSION N/A   Heat To LB on bike   Ice/Vasocompression    MODALITIES - E-STIM TOTAL TIME FOR SESSION    E-stim/H-Wave    MODALITIES - US TOTAL TIME FOR SESSION    US    Iontophoresis    MODALITIES - TRACTION TOTAL TIME FOR SESSION    Mechanical Traction    THER ACT TOTAL TIME FOR SESSION    ROM karey and Oswestry Knee flex R 88 L 85 lumb ROM WNL Oswestry 13/50   Bed mobility    Transfer training    Body Mechanics/Work Training    Pt. education Pt. Education on current condition, POC, and prognosis.  Pt. Provided with initial HEP stretches (see media)   THER EX TOTAL TIME FOR SESSION 60 min   CARDIOVASCULAR     Nu Step    Stationary Bike  x 10 min    Elliptical    Treadmill    UBE NO- 5 min retro L 1   STRENGTHENING    Supine ther-ex  PPT  Bridge  Clamshells  Hip extension (prone, standing)  Squatting  LTR with p-ball  Bridge with feet on p-ball  Triple threat  Bird dog over p-ball     With G TB around knees (bridge with hip abduction) 2 x10 5s hold-no   GTB x 20 each-no  Over p-ball2x10-yes    Yes x10  10x10s-Yes  5s 2x10-yes  x10- yes  x10 yes   Prone quad stretch 3x30s-yes     Rows   High  Mid w/ SLS  Low w/ SLS    Planks modified  NO  GTB  no  2x10-yes  2x10-yes    5 x 20s-yes   Standing ther-ex    STRETCHING    LE stretching:  GSS  Squats on slant board  Hip flexor with strap  Hamstring stretch  Piriformis stretch 1/2 pigeon  ppu  natacha  Open book stretch   At slant board 3 x 30s-yes  Incline/decline x10-no  4x20s -no  4x20s -yes  4x20-30s- yes  10x10s yes  3 min-yes  5 x 10 s each-no   Long sitting HS/GSS Yes 1 x30s   Stand ITB no 1x30s   q-ped cat/cow [id number]   Thoracic stretch w/ p-ball standing 10x10s-yes   Other stretching Tennis ball release L QL and L/s paraspinal in sidelying and supine-NO   REPEATED MOVEMENTS    NEURO RE-ED TOTAL TIME FOR SESSION Not performed   COORDINATION    POSTURAL RE-ED       PRE-GAIT  ACTIVITIES     BALANCE TRAINING     Sitting balance    Standing balance    MANUAL TOTAL TIME FOR SESSION    Stretching LAD B 5 x 30 sec-no   Instructed patient in self lumbar traction with foam roller 5 x 30s   Mobilization     Massage:  Lumbosacral decompression, Piriformis release, Glute release    Taping

## 2021-05-07 NOTE — PROGRESS NOTES
"PT DAILY NOTE FOR OUTPATIENT THERAPY    Patient: Sharon Morales   MRN: [id number]  : 1970 51 y.o.  Referring Physician: Neetu Jang CRNP  Date of Visit: 2021    Certification Dates: 21 through 21    Diagnosis:   1. Left sided sciatica        Chief Complaints:       Precautions:   Existing Precautions/Restrictions: no known precautions/restrictions     TODAY'S VISIT    History/Vitals/Pain/Encounter Info - 21 1019        Injury History/Precautions/Daily Required Info    Onset of Illness/Injury or Date of Surgery  21     Referring Physician  Neetu Jang     Existing Precautions/Restrictions  no known precautions/restrictions     Pertinent History of Current Functional Problem  Pt. complaints of L sided LBP and radiculopathy onset 2021.  Pt. reports symptoms are insidious in nature, and are worsening since onset.  Pt. had a telehealth appt 1.5 months ago and was prescribed a steroid taper pack, which initially provided relief, however symptoms returned after medication use was ended.  Pt. reports difficulty getting out of bed, intermittent tingling and constant pain in the L side of the low back and L posterior thigh to the knee.  Pt. now uses 600 mg ibuprofen PRN for pain.  Pt. reports symptoms are better with movement, worse first thing in the morning.  Pt. swims and walks for exercise.  Pt. works from home.  Pt. 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     OP Specialty  Orthopedics     Document Type  daily treatment     Patient/Family/Caregiver Comments/Observations  Pt. reports that she had increased LBP and \"shooting\" pain into back L leg at pain level of 8/10.     Start Time  1000     Stop Time  1100     Time Calculation (min)  60 min     Patient reported fall since last visit  No        Pain Assessment    Currently in pain  Yes     Preferred " Pain Scale  number (Numeric Rating Pain Scale)     Pain: Body location  Back;Knee     Pain Rating (0-10): Pre Activity  4     Pain Rating (0-10): Post Activity  2        Pain Intervention    Intervention   TE     Post Intervention Comments  better         Daily Treatment Assessment and Plan - 05/07/21 1026        Daily Treatment Assessment and Plan    Progress toward goals  Slower than expected     Daily Outcome Summary  Pt. reports she is coming to PT session directly from swimming and is feeling better after swim since exacerbated symptoms yesterday. Review of ROM and core stab exs. Added planks (from knees), SLS w/ UE TB, squats and reviewed squats and step downs for HEP. Pt. purchased a slant board and will use for HEP.     Plan and Recommendations  Continue toward LTG and contniue to educate patient on I management of symptoms.               Today's Treatment:    ORTHO PT FLOWSHEET    Exercises Current Session Time   MODALITIES- HEAT/ICE TOTAL TIME FOR SESSION N/A   Heat To LB on bike   Ice/Vasocompression    MODALITIES - E-STIM TOTAL TIME FOR SESSION    E-stim/H-Wave    MODALITIES - US TOTAL TIME FOR SESSION    US    Iontophoresis    MODALITIES - TRACTION TOTAL TIME FOR SESSION    Mechanical Traction    THER ACT TOTAL TIME FOR SESSION    ROM karey and Oswestry Knee flex R 88 L 85 lumb ROM WNL Oswestry 13/50   Bed mobility    Transfer training    Body Mechanics/Work Training    Pt. education Pt. Education on current condition, POC, and prognosis.  Pt. Provided with initial HEP stretches (see media)   THER EX TOTAL TIME FOR SESSION 60 min   CARDIOVASCULAR     Nu Step    Stationary Bike  x 10 min    Elliptical    Treadmill    UBE NO- 5 min retro L 1   STRENGTHENING    Supine ther-ex  PPT  Bridge  Clamshells  Hip extension (prone, standing)  Squatting  LTR with p-ball  Bridge with feet on p-ball  Triple threat  Bird dog over p-ball     With G TB around knees (bridge with hip abduction) 2 x10 5s hold-no   GTB x 20  each-no  Over p-ball2x10-yes    Yes x10  10x10s-Yes  5s 2x10-yes  x10- yes  x10 yes   Prone quad stretch 3x30s-yes     Rows   High  Mid w/ SLS  Low w/ SLS    Planks modified  NO  GTB  no  2x10-yes  2x10-yes    5 x 20s-yes   Standing ther-ex    STRETCHING    LE stretching:  GSS  Squats on slant board  Hip flexor with strap  Hamstring stretch  Piriformis stretch 1/2 pigeon  ppu  natacha  Open book stretch   At slant board 3 x 30s-yes  Incline/decline x10-no  4x20s -no  4x20s -yes  4x20-30s- yes  10x10s yes  3 min-yes  5 x 10 s each-no   Long sitting HS/GSS Yes 1 x30s   Stand ITB no 1x30s   q-ped cat/cow [id number]   Thoracic stretch w/ p-ball standing 10x10s-yes   Other stretching Tennis ball release L QL and L/s paraspinal in sidelying and supine-NO   REPEATED MOVEMENTS    NEURO RE-ED TOTAL TIME FOR SESSION Not performed   COORDINATION    POSTURAL RE-ED       PRE-GAIT ACTIVITIES     BALANCE TRAINING     Sitting balance    Standing balance    MANUAL TOTAL TIME FOR SESSION    Stretching LAD B 5 x 30 sec-no   Instructed patient in self lumbar traction with foam roller 5 x 30s   Mobilization     Massage:  Lumbosacral decompression, Piriformis release, Glute release    Taping

## 2021-05-11 ENCOUNTER — HOSPITAL ENCOUNTER (OUTPATIENT)
Dept: PHYSICAL THERAPY | Age: 51
Setting detail: THERAPIES SERIES
Discharge: HOME | End: 2021-05-11
Attending: NURSE PRACTITIONER
Payer: COMMERCIAL

## 2021-05-11 DIAGNOSIS — M54.32 LEFT SIDED SCIATICA: Primary | ICD-10-CM

## 2021-05-11 PROCEDURE — 97110 THERAPEUTIC EXERCISES: CPT | Mod: GP,CQ

## 2021-05-11 NOTE — PROGRESS NOTES
PT DAILY NOTE FOR OUTPATIENT THERAPY    Patient: Sharon Morales   MRN: [id number]  : 1970 51 y.o.  Referring Physician: Neetu Jang CRNP  Date of Visit: 2021    Certification Dates: 21 through 21    Diagnosis:   1. Left sided sciatica        Chief Complaints:       Precautions:   Existing Precautions/Restrictions: no known precautions/restrictions     TODAY'S VISIT    History/Vitals/Pain/Encounter Info - 21 1120        Injury History/Precautions/Daily Required Info    Onset of Illness/Injury or Date of Surgery  21     Referring Physician  Neetu Jang     Existing Precautions/Restrictions  no known precautions/restrictions     Pertinent History of Current Functional Problem  Pt. complaints of L sided LBP and radiculopathy onset 2021.  Pt. reports symptoms are insidious in nature, and are worsening since onset.  Pt. had a telehealth appt 1.5 months ago and was prescribed a steroid taper pack, which initially provided relief, however symptoms returned after medication use was ended.  Pt. reports difficulty getting out of bed, intermittent tingling and constant pain in the L side of the low back and L posterior thigh to the knee.  Pt. now uses 600 mg ibuprofen PRN for pain.  Pt. reports symptoms are better with movement, worse first thing in the morning.  Pt. swims and walks for exercise.  Pt. works from home.  Pt. 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     OP Specialty  Orthopedics     Document Type  daily treatment     Patient/Family/Caregiver Comments/Observations  Pt. states her R knee is bothering her a lot today. Went to gym prior to PT for an hour.     Start Time  1100     Stop Time  1205     Time Calculation (min)  65 min     Patient reported fall since last visit  No        Pain Assessment    Currently in pain  Yes     Preferred Pain  Scale  number (Numeric Rating Pain Scale)     Pain: Body location  Back     Pain Rating (0-10): Pre Activity  3     Pain Rating (0-10): Post Activity  2        Pain Intervention    Intervention   TE     Post Intervention Comments  better         Daily Treatment Assessment and Plan - 05/11/21 1542        Daily Treatment Assessment and Plan    Progress toward goals  Slower than expected     Daily Outcome Summary  Pt. reports she is starting to have less symptoms into LE with more LBP. Continued with stretches on mat, core stab exs with p-ball and TB. Added side walking in small squat to challenge core stability. Pt. reports she is doing well with HEP.     Plan and Recommendations  Continue toward LTG and contniue to educate patient on I management of symptoms.               Today's Treatment:    ORTHO PT FLOWSHEET    Exercises Current Session Time   MODALITIES- HEAT/ICE TOTAL TIME FOR SESSION N/A   Heat To LB on bike   Ice/Vasocompression    MODALITIES - E-STIM TOTAL TIME FOR SESSION    E-stim/H-Wave    MODALITIES - US TOTAL TIME FOR SESSION    US    Iontophoresis    MODALITIES - TRACTION TOTAL TIME FOR SESSION    Mechanical Traction    THER ACT TOTAL TIME FOR SESSION    ROM karey and Oswestry Knee flex R 88 L 85 lumb ROM WNL Oswestry 13/50   Bed mobility    Transfer training    Body Mechanics/Work Training    Pt. education Pt. Education on current condition, POC, and prognosis.  Pt. Provided with initial HEP stretches (see media)   THER EX TOTAL TIME FOR SESSION 65 min   CARDIOVASCULAR     Nu Step    Stationary Bike  x 10 min -NO   Elliptical    Treadmill    UBE NO- 5 min retro L 1   STRENGTHENING    Supine ther-ex  PPT  Bridge  Clamshells  Hip extension (prone, standing)  Squatting  LTR with p-ball  Bridge with feet on p-ball  Triple threat  Bird dog over p-ball  B leg lowering from 90 w/ ball sq     With G TB around knees (bridge with hip abduction) 2 x10 5s hold-no   GTB x 20 each-no  Over [id number]    no  x10  5x10s-Yes  5s 2x10-yes  x10- yes  2x10 yes  10x10s-yes   Prone quad stretch 3x30s-yes     Rows   Mid horiz abd  Mid w/ SLS  Low w/ SLS    Planks modified  NO  GTB  2x10-yes  2x10-yes  2x10-yes    5 x 20s-yes   Standing ther-ex    STRETCHING    LE stretching:  GSS  Squats on slant board  Hip flexor with strap  Hamstring stretch  Piriformis stretch 1/2 pigeon  ppu  natacha  Open book stretch   At slant board 3 x 30s-yes  Incline/decline x10-no  4x20s -no  4x20s -yes  4x20-30s- yes  10x10s yes  3 min-yes  5 x 10 s each-no   Long sitting HS/GSS Yes 1 x30s   Stand ITB no 1x30s   q-ped cat/cow [id number]   Thoracic stretch w/ p-ball standing [id number]   Other stretching Tennis ball release L QL and L/s paraspinal in sidelying and supine-NO   REPEATED MOVEMENTS    NEURO RE-ED TOTAL TIME FOR SESSION Not performed   COORDINATION    POSTURAL RE-ED       PRE-GAIT ACTIVITIES     BALANCE TRAINING     Sitting balance    Standing balance    MANUAL TOTAL TIME FOR SESSION Billed with TE   Stretching LAD B 5 x 30 sec-yes  Instructed patient in self lumbar traction with foam roller 5 x 30s-NO   Mobilization     Massage:  Lumbosacral decompression, Piriformis release, Glute release    Taping

## 2021-05-11 NOTE — OP PT TREATMENT LOG
ORTHO PT FLOWSHEET    Exercises Current Session Time   MODALITIES- HEAT/ICE TOTAL TIME FOR SESSION N/A   Heat To LB on bike   Ice/Vasocompression    MODALITIES - E-STIM TOTAL TIME FOR SESSION    E-stim/H-Wave    MODALITIES - US TOTAL TIME FOR SESSION    US    Iontophoresis    MODALITIES - TRACTION TOTAL TIME FOR SESSION    Mechanical Traction    THER ACT TOTAL TIME FOR SESSION    ROM karey and Oswestry Knee flex R 88 L 85 lumb ROM WNL Oswestry 13/50   Bed mobility    Transfer training    Body Mechanics/Work Training    Pt. education Pt. Education on current condition, POC, and prognosis.  Pt. Provided with initial HEP stretches (see media)   THER EX TOTAL TIME FOR SESSION 65 min   CARDIOVASCULAR     Nu Step    Stationary Bike  x 10 min -NO   Elliptical    Treadmill    UBE NO- 5 min retro L 1   STRENGTHENING    Supine ther-ex  PPT  Bridge  Clamshells  Hip extension (prone, standing)  Squatting  LTR with p-ball  Bridge with feet on p-ball  Triple threat  Bird dog over p-ball  B leg lowering from 90 w/ ball sq     With G TB around knees (bridge with hip abduction) 2 x10 5s hold-no   GTB x 20 each-no  Over [id number]    no x10  5x10s-Yes  5s 2x10-yes  x10- yes  2x10 yes  10x10s-yes   Prone quad stretch 3x30s-yes     Rows   Mid horiz abd  Mid w/ SLS  Low w/ SLS    Planks modified  NO  GTB  2x10-yes  2x10-yes  2x10-yes    5 x 20s-yes   Standing ther-ex    STRETCHING    LE stretching:  GSS  Squats on slant board  Hip flexor with strap  Hamstring stretch  Piriformis stretch 1/2 pigeon  ppu  natacha  Open book stretch   At slant board 3 x 30s-yes  Incline/decline x10-no  4x20s -no  4x20s -yes  4x20-30s- yes  10x10s yes  3 min-yes  5 x 10 s each-no   Long sitting HS/GSS Yes 1 x30s   Stand ITB no 1x30s   q-ped cat/cow [id number]   Thoracic stretch w/ p-ball standing [id number]   Other stretching Tennis ball release L QL and L/s paraspinal in sidelying and supine-NO   REPEATED MOVEMENTS    NEURO RE-ED TOTAL TIME FOR SESSION Not  performed   COORDINATION    POSTURAL RE-ED       PRE-GAIT ACTIVITIES     BALANCE TRAINING     Sitting balance    Standing balance    MANUAL TOTAL TIME FOR SESSION Billed with TE   Stretching LAD B 5 x 30 sec-yes  Instructed patient in self lumbar traction with foam roller 5 x 30s-NO   Mobilization     Massage:  Lumbosacral decompression, Piriformis release, Glute release    Taping

## 2021-05-11 NOTE — ADDENDUM NOTE
Encounter addended by: Noris Paniagua PTA on: 5/11/2021 4:24 PM   Actions taken: Clinical Note Signed

## 2021-05-14 ENCOUNTER — HOSPITAL ENCOUNTER (OUTPATIENT)
Dept: PHYSICAL THERAPY | Age: 51
Setting detail: THERAPIES SERIES
Discharge: HOME | End: 2021-05-14
Attending: NURSE PRACTITIONER
Payer: COMMERCIAL

## 2021-05-14 DIAGNOSIS — M54.32 LEFT SIDED SCIATICA: Primary | ICD-10-CM

## 2021-05-14 PROCEDURE — 97140 MANUAL THERAPY 1/> REGIONS: CPT | Mod: GP,CQ

## 2021-05-14 PROCEDURE — 97110 THERAPEUTIC EXERCISES: CPT | Mod: GP,CQ

## 2021-05-14 NOTE — PROGRESS NOTES
PT DAILY NOTE FOR OUTPATIENT THERAPY    Patient: Sharon Morales   MRN: [id number]  : 1970 51 y.o.  Referring Physician: Neetu Jang CRNP  Date of Visit: 2021    Certification Dates: 21 through 21    Diagnosis:   1. Left sided sciatica        Chief Complaints:       Precautions:   Existing Precautions/Restrictions: no known precautions/restrictions     TODAY'S VISIT    History/Vitals/Pain/Encounter Info - 21 1150        Injury History/Precautions/Daily Required Info    Onset of Illness/Injury or Date of Surgery  21     Referring Physician  Neetu Jang     Existing Precautions/Restrictions  no known precautions/restrictions     Pertinent History of Current Functional Problem  Pt. complaints of L sided LBP and radiculopathy onset 2021.  Pt. reports symptoms are insidious in nature, and are worsening since onset.  Pt. had a telehealth appt 1.5 months ago and was prescribed a steroid taper pack, which initially provided relief, however symptoms returned after medication use was ended.  Pt. reports difficulty getting out of bed, intermittent tingling and constant pain in the L side of the low back and L posterior thigh to the knee.  Pt. now uses 600 mg ibuprofen PRN for pain.  Pt. reports symptoms are better with movement, worse first thing in the morning.  Pt. swims and walks for exercise.  Pt. works from home.  Pt. 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     OP Specialty  Orthopedics     Document Type  daily treatment     Patient/Family/Caregiver Comments/Observations  Pt. reports some LBP over past few days but is having less radicular symptoms into LLE.     Start Time  1100     Stop Time  1200     Time Calculation (min)  60 min     Patient reported fall since last visit  No        Pain Assessment    Currently in pain  Yes     Preferred Pain  Scale  number (Numeric Rating Pain Scale)     Pain Side/Orientation  bilateral     Pain: Body location  Back     Pain Rating (0-10): Pre Activity  4     Pain Rating (0-10): Post Activity  2        Pain Intervention    Intervention   TE, HP, STM     Post Intervention Comments  better         Daily Treatment Assessment and Plan - 05/14/21 1158        Daily Treatment Assessment and Plan    Progress toward goals  Slower than expected     Daily Outcome Summary  Warm up on RB today x 10 min with HP on LB. Review of LB and LE stretches and core and LE strengthening exs aas noted on log. Massage to lumbo-sacral area with pt in prone lying over pillow x 15 min. Pt. reports LB feels better at end of session pain level 2/10.     Plan and Recommendations  Continue toward LTG and contniue to educate patient on I management of symptoms.               Today's Treatment:    ORTHO PT FLOWSHEET    Exercises Current Session Time   MODALITIES- HEAT/ICE TOTAL TIME FOR SESSION N/A   Heat To LB on bike   Ice/Vasocompression    MODALITIES - E-STIM TOTAL TIME FOR SESSION    E-stim/H-Wave    MODALITIES - US TOTAL TIME FOR SESSION    US    Iontophoresis    MODALITIES - TRACTION TOTAL TIME FOR SESSION    Mechanical Traction    THER ACT TOTAL TIME FOR SESSION    ROM karey and Oswestry Knee flex R 88 L 85 lumb ROM WNL Oswestry 13/50   Bed mobility    Transfer training    Body Mechanics/Work Training    Pt. education Pt. Education on current condition, POC, and prognosis.  Pt. Provided with initial HEP stretches (see media)   THER EX TOTAL TIME FOR SESSION 45 min   CARDIOVASCULAR     Nu Step    Stationary Bike  x 10 min yes with HP   Elliptical    Treadmill    UBE NO- 5 min retro L 1   STRENGTHENING    Supine ther-ex  PPT  Bridge  Clamshells  Hip extension (prone, standing)  Squatting  LTR with p-ball  Bridge with feet on p-ball  Triple threat  Bird dog over p-ball  B leg lowering from 90 w/ ball sq     With G TB around knees (bridge with hip  abduction) 2 x10 5s hold-no   GTB x 20 each-no  Over p-ball2x10-yes    no x10  5x10s-Yes  5s 2x10-no  x10- no  2x10 no  [id number]   Prone quad stretch 3x30s-yes     Rows   Mid horiz abd  Mid w/ SLS  Low w/ SLS    Planks modified  NO  GTB  2x10-no  2x10-no  2x10-no    5 x 20s-no   P-ball I,Y,T 2x10    Standing ther-ex    STRETCHING    LE stretching:  GSS  Squats on slant board  Hip flexor with strap  Hamstring stretch  Piriformis stretch 1/2 pigeon  ppu  natacha  Open book stretch   At slant board 3 x 30s-yes  Incline/decline x10-no  4x20s -yes  4x20s -yes  4x20-30s- no  10x10s yes  3 min-yes  5 x 10 s each-yes   Long sitting HS/GSS no 1 x30s   Stand ITB no 1x30s   q-ped cat/cow 10x10s-yes   Thoracic stretch w/ p-ball standing 10x10s-yes   Other stretching Tennis ball release L QL and L/s paraspinal in sidelying and supine-NO   REPEATED MOVEMENTS    NEURO RE-ED TOTAL TIME FOR SESSION Not performed   COORDINATION    POSTURAL RE-ED       PRE-GAIT ACTIVITIES     BALANCE TRAINING     Sitting balance    Standing balance    MANUAL TOTAL TIME FOR SESSION 15 min   Stretching LAD B 5 x 30 sec-no  Instructed patient in self lumbar traction with foam roller 5 x 30s-NO   Mobilization     Massage:  Lumbosacral decompression, Piriformis release, Glute release L-S area pt. Prone over pillow-YES   Taping

## 2021-05-14 NOTE — OP PT TREATMENT LOG
ORTHO PT FLOWSHEET    Exercises Current Session Time   MODALITIES- HEAT/ICE TOTAL TIME FOR SESSION N/A   Heat To LB on bike   Ice/Vasocompression    MODALITIES - E-STIM TOTAL TIME FOR SESSION    E-stim/H-Wave    MODALITIES - US TOTAL TIME FOR SESSION    US    Iontophoresis    MODALITIES - TRACTION TOTAL TIME FOR SESSION    Mechanical Traction    THER ACT TOTAL TIME FOR SESSION    ROM karey and Oswestry Knee flex R 88 L 85 lumb ROM WNL Oswestry 13/50   Bed mobility    Transfer training    Body Mechanics/Work Training    Pt. education Pt. Education on current condition, POC, and prognosis.  Pt. Provided with initial HEP stretches (see media)   THER EX TOTAL TIME FOR SESSION 45 min   CARDIOVASCULAR     Nu Step    Stationary Bike  x 10 min yes with HP   Elliptical    Treadmill    UBE NO- 5 min retro L 1   STRENGTHENING    Supine ther-ex  PPT  Bridge  Clamshells  Hip extension (prone, standing)  Squatting  LTR with p-ball  Bridge with feet on p-ball  Triple threat  Bird dog over p-ball  B leg lowering from 90 w/ ball sq     With G TB around knees (bridge with hip abduction) 2 x10 5s hold-no   GTB x 20 each-no  Over p-ball2x10-yes    no x10  5x10s-Yes  5s 2x10-no  x10- no  2x10 no  [id number]   Prone quad stretch 3x30s-yes     Rows   Mid horiz abd  Mid w/ SLS  Low w/ SLS    Planks modified  NO  GTB  2x10-no  2x10-no  2x10-no    5 x 20s-no   P-ball I,Y,T 2x10    Standing ther-ex    STRETCHING    LE stretching:  GSS  Squats on slant board  Hip flexor with strap  Hamstring stretch  Piriformis stretch 1/2 pigeon  ppu  natacha  Open book stretch   At slant board 3 x 30s-yes  Incline/decline x10-no  4x20s -yes  4x20s -yes  4x20-30s- no  10x10s yes  3 min-yes  5 x 10 s each-yes   Long sitting HS/GSS no 1 x30s   Stand ITB no 1x30s   q-ped cat/cow 10x10s-yes   Thoracic stretch w/ p-ball standing 10x10s-yes   Other stretching Tennis ball release L QL and L/s paraspinal in sidelying and supine-NO   REPEATED MOVEMENTS    NEURO RE-ED TOTAL  TIME FOR SESSION Not performed   COORDINATION    POSTURAL RE-ED       PRE-GAIT ACTIVITIES     BALANCE TRAINING     Sitting balance    Standing balance    MANUAL TOTAL TIME FOR SESSION 15 min   Stretching LAD B 5 x 30 sec-no  Instructed patient in self lumbar traction with foam roller 5 x 30s-NO   Mobilization     Massage:  Lumbosacral decompression, Piriformis release, Glute release L-S area pt. Prone over pillow-YES   Taping

## 2021-05-16 ENCOUNTER — IMMUNIZATION (OUTPATIENT)
Dept: IMMUNIZATION | Facility: CLINIC | Age: 51
End: 2021-05-16

## 2021-05-18 ENCOUNTER — HOSPITAL ENCOUNTER (OUTPATIENT)
Dept: PHYSICAL THERAPY | Age: 51
Setting detail: THERAPIES SERIES
Discharge: HOME | End: 2021-05-18
Attending: NURSE PRACTITIONER
Payer: COMMERCIAL

## 2021-05-18 DIAGNOSIS — M54.32 LEFT SIDED SCIATICA: Primary | ICD-10-CM

## 2021-05-18 PROCEDURE — 97110 THERAPEUTIC EXERCISES: CPT | Mod: GP,CQ

## 2021-05-18 NOTE — OP PT TREATMENT LOG
ORTHO PT FLOWSHEET    Exercises Current Session Time   MODALITIES- HEAT/ICE TOTAL TIME FOR SESSION N/A   Heat To LB on bike   Ice/Vasocompression    MODALITIES - E-STIM TOTAL TIME FOR SESSION    E-stim/H-Wave    MODALITIES - US TOTAL TIME FOR SESSION    US    Iontophoresis    MODALITIES - TRACTION TOTAL TIME FOR SESSION    Mechanical Traction    THER ACT TOTAL TIME FOR SESSION    ROM karey and Oswestry Knee flex R 88 L 85 lumb ROM WNL Oswestry 13/50   Bed mobility    Transfer training    Body Mechanics/Work Training    Pt. education Pt. Education on current condition, POC, and prognosis.  Pt. Provided with initial HEP stretches (see media)   THER EX TOTAL TIME FOR SESSION 55 min   CARDIOVASCULAR     Nu Step    Stationary Bike  x 10 min no   Elliptical    Treadmill    UBE NO- 5 min retro L 1   STRENGTHENING    Supine ther-ex  PPT  Bridge  Clamshells  Hip extension (prone, standing)  Squatting  LTR with p-ball  Bridge with feet on p-ball  Triple threat  Bird dog over p-ball  B leg lowering from 90 w/ ball sq     With G TB around knees (bridge with hip abduction) 2 x10 5s hold-no   GTB x 20 each-no  Over [id number]    no x10  5x10s-Yes  5s 2x10-yes  x10- yes  2x10 yes  20x10s-yes   Prone quad stretch 3x30s-yes     Rows   Mid horiz abd  Mid w/ SLS  Low w/ SLS  pallof press    Planks modified  NO  GTB  2x10-no  2x10-yes  2x10-yes  x10 each- yes blue theratube    5 x 20s-yes   P-ball I,Y,T 2x10 -yes   Standing ther-ex    STRETCHING    LE stretching:  GSS  Squats on slant board  Hip flexor with strap  Hamstring stretch  Piriformis stretch 1/2 pigeon  ppu  natacha  Open book stretch   At slant board 3 x 30s-yes  Incline/decline x10-no  4x20s -yes  4x20s -yes  4x20-30s- yes  10x10s yes  3 min-yes  5 x 10 s each-yes   Long sitting HS/GSS yes 1 x30s   Stand ITB no 1x30s   q-ped cat/cow 10x10s-yes   Thoracic stretch w/ p-ball standing 10x10s-yes   Thoracic rotation X 10 each-yes   Other stretching Tennis ball release L QL and L/s  paraspinal in sidelying and supine-NO   REPEATED MOVEMENTS    NEURO RE-ED TOTAL TIME FOR SESSION Not performed   COORDINATION    POSTURAL RE-ED       PRE-GAIT ACTIVITIES     BALANCE TRAINING     Sitting balance    Standing balance    MANUAL TOTAL TIME FOR SESSION    Stretching LAD B 5 x 30 sec-no  Instructed patient in self lumbar traction with foam roller 5 x 30s-NO   Mobilization     Massage:  Lumbosacral decompression, Piriformis release, Glute release L-S area pt. Prone over pillow-YES   Taping

## 2021-05-18 NOTE — PROGRESS NOTES
PT DAILY NOTE FOR OUTPATIENT THERAPY    Patient: Sharon Morales   MRN: [id number]  : 1970 51 y.o.  Referring Physician: Neetu Jang CRNP  Date of Visit: 2021    Certification Dates: 21 through 21    Diagnosis:   1. Left sided sciatica        Chief Complaints:       Precautions:   Existing Precautions/Restrictions: no known precautions/restrictions     TODAY'S VISIT    History/Vitals/Pain/Encounter Info - 21 1121        Injury History/Precautions/Daily Required Info    Onset of Illness/Injury or Date of Surgery  21     Referring Physician  Neetu Jang     Existing Precautions/Restrictions  no known precautions/restrictions     Pertinent History of Current Functional Problem  Pt. complaints of L sided LBP and radiculopathy onset 2021.  Pt. reports symptoms are insidious in nature, and are worsening since onset.  Pt. had a telehealth appt 1.5 months ago and was prescribed a steroid taper pack, which initially provided relief, however symptoms returned after medication use was ended.  Pt. reports difficulty getting out of bed, intermittent tingling and constant pain in the L side of the low back and L posterior thigh to the knee.  Pt. now uses 600 mg ibuprofen PRN for pain.  Pt. reports symptoms are better with movement, worse first thing in the morning.  Pt. swims and walks for exercise.  Pt. works from home.  Pt. 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     OP Specialty  Orthopedics     Document Type  daily treatment     Patient/Family/Caregiver Comments/Observations  Reports back and L LE symptoms are mild 2/10 states she did a lot of walking over weekend. Still having constant R knee pain.     Start Time  1105     Stop Time  1200     Time Calculation (min)  55 min     Patient reported fall since last visit  No        Pain Assessment     Currently in pain  Yes     Preferred Pain Scale  number (Numeric Rating Pain Scale)     Pain Side/Orientation  left     Pain: Body location  Leg;Back     Pain Rating (0-10): Pre Activity  2     Pain Rating (0-10): Post Activity  2        Pain Intervention    Intervention   TE, STM     Post Intervention Comments  no worse         Daily Treatment Assessment and Plan - 05/18/21 1440        Daily Treatment Assessment and Plan    Progress toward goals  Slower than expected     Daily Outcome Summary  Pt. reports she was at the gym this morning and did cardio and upper body weight training. Initiated session with extension stretches and then reviewed p-ball exercises and stretches. With Core stab exs increased reps to 20x for B leg lower. Added q-ped thread needle stretch and thoracic rotation. Pt. reports no increased LBP at end of session.     Plan and Recommendations  Continue toward LTG and contniue to educate patient on I management of symptoms. Pt. to see a clinician on thursday to have scar tissue from knee surgeries lasered.               Today's Treatment:    ORTHO PT FLOWSHEET    Exercises Current Session Time   MODALITIES- HEAT/ICE TOTAL TIME FOR SESSION N/A   Heat To LB on bike   Ice/Vasocompression    MODALITIES - E-STIM TOTAL TIME FOR SESSION    E-stim/H-Wave    MODALITIES - US TOTAL TIME FOR SESSION    US    Iontophoresis    MODALITIES - TRACTION TOTAL TIME FOR SESSION    Mechanical Traction    THER ACT TOTAL TIME FOR SESSION    ROM karey and Oswestry Knee flex R 88 L 85 lumb ROM WNL Oswestry 13/50   Bed mobility    Transfer training    Body Mechanics/Work Training    Pt. education Pt. Education on current condition, POC, and prognosis.  Pt. Provided with initial HEP stretches (see media)   THER EX TOTAL TIME FOR SESSION 55 min   CARDIOVASCULAR     Nu Step    Stationary Bike  x 10 min no   Elliptical    Treadmill    UBE NO- 5 min retro L 1   STRENGTHENING    Supine ther-ex  PPT  Bridge  Clamshells  Hip  extension (prone, standing)  Squatting  LTR with p-ball  Bridge with feet on p-ball  Triple threat  Bird dog over p-ball  B leg lowering from 90 w/ ball sq     With G TB around knees (bridge with hip abduction) 2 x10 5s hold-no   GTB x 20 each-no  Over [id number]    no x10  5x10s-Yes  5s 2x10-yes  x10- yes  2x10 yes  20x10s-yes   Prone quad stretch 3x30s-yes     Rows   Mid horiz abd  Mid w/ SLS  Low w/ SLS  pallof press    Planks modified  NO  GTB  2x10-no  2x10-yes  2x10-yes  x10 each- yes blue theratube    5 x 20s-yes   P-ball I,Y,T 2x10 -yes   Standing ther-ex    STRETCHING    LE stretching:  GSS  Squats on slant board  Hip flexor with strap  Hamstring stretch  Piriformis stretch 1/2 pigeon  ppu  natacha  Open book stretch   At slant board 3 x 30s-yes  Incline/decline x10-no  4x20s -yes  4x20s -yes  4x20-30s- yes  10x10s yes  3 min-yes  5 x 10 s each-yes   Long sitting HS/GSS yes 1 x30s   Stand ITB no 1x30s   q-ped cat/cow 10x10s-yes   Thoracic stretch w/ p-ball standing 10x10s-yes   Thoracic rotation X 10 each-yes   Other stretching Tennis ball release L QL and L/s paraspinal in sidelying and supine-NO   REPEATED MOVEMENTS    NEURO RE-ED TOTAL TIME FOR SESSION Not performed   COORDINATION    POSTURAL RE-ED       PRE-GAIT ACTIVITIES     BALANCE TRAINING     Sitting balance    Standing balance    MANUAL TOTAL TIME FOR SESSION    Stretching LAD B 5 x 30 sec-no  Instructed patient in self lumbar traction with foam roller 5 x 30s-NO   Mobilization     Massage:  Lumbosacral decompression, Piriformis release, Glute release L-S area pt. Prone over pillow-YES   Taping

## 2021-05-20 ENCOUNTER — OFFICE VISIT (OUTPATIENT)
Dept: PHYSICAL THERAPY | Facility: CLINIC | Age: 51
End: 2021-05-20
Payer: COMMERCIAL

## 2021-05-20 DIAGNOSIS — M76.892 ADHESIVE CAPSULITIS OF LEFT KNEE: Primary | ICD-10-CM

## 2021-05-20 DIAGNOSIS — M76.891 ADHESIVE CAPSULITIS OF RIGHT KNEE: ICD-10-CM

## 2021-05-20 PROCEDURE — 97140 MANUAL THERAPY 1/> REGIONS: CPT | Performed by: PHYSICAL THERAPIST

## 2021-05-20 PROCEDURE — 97161 PT EVAL LOW COMPLEX 20 MIN: CPT | Performed by: PHYSICAL THERAPIST

## 2021-05-20 RX ORDER — LEVOTHYROXINE SODIUM 0.1 MG/1
100 TABLET ORAL DAILY
COMMUNITY

## 2021-05-20 NOTE — PROGRESS NOTES
PT Evaluation     Today's date: 2021  Patient name: Froylan Flores  : 1970  MRN: [id number]  Referring provider: Leonel Escalante PT  Dx:   Encounter Diagnosis     ICD-10-CM    1  Adhesive capsulitis of left knee  M76 892    2  Adhesive capsulitis of right knee  M76 891                   Assessment  Assessment details: Froylan Flores is a 46 y o  female presenting to outpatient physical therapy at [place] [address] with complaints of B knee pain  She presents with decreased range of motion, limited flexibility, decreased tolerance to activity and decreased functional mobility due to B knee adhesive capsulitis  She has long hx of B knee pain with multiple surgeries and manipulations  She would benefit from skilled PT services in order to address these deficits and reach maximum level of function  Impairments: abnormal gait, abnormal or restricted ROM, activity intolerance, impaired balance, impaired physical strength, lacks appropriate home exercise program and pain with function  Barriers to therapy: None  Understanding of Dx/Px/POC: excellent   Prognosis: good    Goals  ST  Independent with HEP in 2 weeks  2  Increase ROM B knee flex PROM by 5 degrees in 2 weeks     LT  Achieve FOTO score of 65/100 in 4 weeks   2    Able to ambulate stairs normallly in 4 weeks      Plan  Patient would benefit from: skilled PT and PT eval  Planned modality interventions: cryotherapy, TENS and thermotherapy: hydrocollator packs  Other planned modality interventions: laser  Planned therapy interventions: ADL retraining, balance/weight bearing training, flexibility, functional ROM exercises, home exercise program, joint mobilization, manual therapy, neuromuscular re-education, strengthening, stretching, therapeutic activities and therapeutic exercise  Frequency: 2x week  Duration in weeks: 4  Plan of Care beginning date: 2021  Plan of Care expiration date: 2021  Treatment plan discussed with: patient        Subjective Evaluation    History of Present Illness  Mechanism of injury: Pt reports having B knee pain for many years  Had thigh surgery as an infant  In 2018 had B TKA with 2 manipulations in 2018 and 2018 for both without success  Had revision in 2019 of R knee and 2019 for L  Works as a market researcher full time  Swims 3-4x/wk, but has very poor ROM that limits her on steps  Was able to walk up to 3 miles prior to stopping exercise during COVID and now can only do up to 2 miles  Also has LB issues due to the way she ambulates  Recurrent probem    Quality of life: good    Pain  Current pain ratin  At best pain ratin  At worst pain ratin  Quality: tight and dull ache  Aggravating factors: stair climbing and walking  Progression: worsening    Social Support  Steps to enter house: yes  Stairs in house: yes   Lives in: multiple-level home    Employment status: working    Diagnostic Tests  No diagnostic tests performed  Treatments  No previous or current treatments  Patient Goals  Patient goals for therapy: decreased pain, increased motion, independence with ADLs/IADLs and return to sport/leisure activities          Objective     Observations   Left Knee   Negative for effusion  Right Knee   Negative for effusion  Additional Observation Details  Multiple healed scars B knees       Neurological Testing     Sensation     Knee   Left Knee   Intact: light touch    Right Knee   Intact: light touch     Active Range of Motion   Left Knee   Flexion: 86 degrees   Extension: -5 degrees   Extensor la degrees     Right Knee   Flexion: 84 degrees   Extension: 0 degrees   Extensor la degrees     Passive Range of Motion   Left Knee   Flexion: 88 degrees   Extension: -4 degrees     Right Knee   Flexion: 85 degrees   Extension: 0 degrees     Mobility   Tibiofemoral Mobility:   Left knee Hypomobile in the anterior and posterior tibiofemoral tendon(s)  Right knee Hypomobile in the anterior and posterior tibiofemoral tendon(s)  Strength/Myotome Testing     Left Knee   Normal strength    Right Knee   Normal strength    Ambulation     Comments   Slightly limited L knee ext with IC         Flowsheet Rows      Most Recent Value   PT/OT G-Codes   Current Score  52   Projected Score  65             POC EXPIRES On:  6/19/21  PRECAUTIONS:  None  CO-MORBIDITES:  B TKA  PERSONAL FACTORS:  None      Manuals HEP 5/20           Laser B knees  9'           Jt mobs B knees  3'           PROM B knees flex, L knee ext  8'                        Neuro Re-Ed                                                                                                Ther Ex    Seated knee flex stretch sitting in chair 5/20                                                                                                       Ther Activity                              Gait Training                              Modalities

## 2021-05-21 ENCOUNTER — HOSPITAL ENCOUNTER (OUTPATIENT)
Dept: PHYSICAL THERAPY | Age: 51
Setting detail: THERAPIES SERIES
Discharge: HOME | End: 2021-05-21
Attending: NURSE PRACTITIONER
Payer: COMMERCIAL

## 2021-05-21 DIAGNOSIS — M54.32 LEFT SIDED SCIATICA: Primary | ICD-10-CM

## 2021-05-21 PROCEDURE — 97110 THERAPEUTIC EXERCISES: CPT | Mod: GP,CQ

## 2021-05-21 NOTE — OP PT TREATMENT LOG
ORTHO PT FLOWSHEET    Exercises Current Session Time   MODALITIES- HEAT/ICE TOTAL TIME FOR SESSION N/A   Heat To LB on bike   Ice/Vasocompression    MODALITIES - E-STIM TOTAL TIME FOR SESSION    E-stim/H-Wave    MODALITIES - US TOTAL TIME FOR SESSION    US    Iontophoresis    MODALITIES - TRACTION TOTAL TIME FOR SESSION    Mechanical Traction    THER ACT TOTAL TIME FOR SESSION    ROM karey and Oswestry Knee flex R 88 L 85 lumb ROM WNL Oswestry 13/50   Bed mobility    Transfer training    Body Mechanics/Work Training    Pt. education Pt. Education on current condition, POC, and prognosis.  Pt. Provided with initial HEP stretches (see media)   THER EX TOTAL TIME FOR SESSION 60 min   CARDIOVASCULAR     Nu Step    Stationary Bike  x 10 min no   Elliptical    Treadmill    UBE 10  min retro L 2- yes   STRENGTHENING    Supine ther-ex  PPT  Bridge  Clamshells  Hip extension (prone, standing)  Squatting  LTR with p-ball  Bridge with feet on p-ball  Triple threat  Bird dog over p-ball  Bird dog q-ped  B leg lowering from 90 w/ ball sq     With G TB around knees (bridge with hip abduction) 2 x10 5s hold-no   GTB x 20 each-no  Over EOM 1x15 1.5# ankle weights - YES    no x10  5x10s-Yes  5s 2x10-yes  x10- yes  x10 1# UE/1.5# LE- yes  x10 1# UE/1.5# LE- yes  20x10s-yes   Prone quad stretch 3x30s-yes     Rows   Mid horiz abd  Mid w/ SLS  Low w/ SLS  pallof press    Planks modified  NO  GTB  2x10-no  2x10-no  2x10-no  x10 each- yes blue theratube    5 x 30s-yes   Seals + LE 10x10s- YES   P-ball I,Y,T 2x10 -no   Standing ther-ex    STRETCHING    LE stretching:  GSS  Squats on slant board  Hip flexor with strap  Hamstring stretch  Piriformis stretch 1/2 pigeon  ppu  natacha  Open book stretch   At slant board 3 x 30s-yes  Incline/decline x10-no  4x20s -yes  4x20s -yes  4x20-30s- yes  10x10s -no  3 min-no  5 x 10 s each-no   Long sitting HS/GSS yes 1 x30s   Stand ITB no 1x30s   q-ped cat/cow [id number]   Thoracic stretch w/ p-ball standing  [id number]   Thoracic rotation X 10 each-no   Other stretching Tennis ball release L QL and L/s paraspinal in sidelying and supine-NO   REPEATED MOVEMENTS    NEURO RE-ED TOTAL TIME FOR SESSION Not performed   COORDINATION    POSTURAL RE-ED       PRE-GAIT ACTIVITIES     BALANCE TRAINING     Sitting balance    Standing balance    MANUAL TOTAL TIME FOR SESSION bolled with TE   Stretching LAD B 5 x 30 sec-yes  Instructed patient in self lumbar traction with foam roller 5 x 30s-NO   Mobilization     Massage:  Lumbosacral decompression, Piriformis release, Glute release L-S area pt. Prone over pillow-YES   Taping

## 2021-05-21 NOTE — PROGRESS NOTES
PT DAILY NOTE FOR OUTPATIENT THERAPY    Patient: Sharon Morales   MRN: [id number]  : 1970 51 y.o.  Referring Physician: Neetu Jang CRNP  Date of Visit: 2021    Certification Dates: 21 through 21    Diagnosis:   1. Left sided sciatica        Chief Complaints:       Precautions:   Existing Precautions/Restrictions: no known precautions/restrictions     TODAY'S VISIT    History/Vitals/Pain/Encounter Info - 21 1123        Injury History/Precautions/Daily Required Info    Onset of Illness/Injury or Date of Surgery  21     Referring Physician  Neetu aJng     Existing Precautions/Restrictions  no known precautions/restrictions     Pertinent History of Current Functional Problem  Pt. complaints of L sided LBP and radiculopathy onset 2021.  Pt. reports symptoms are insidious in nature, and are worsening since onset.  Pt. had a telehealth appt 1.5 months ago and was prescribed a steroid taper pack, which initially provided relief, however symptoms returned after medication use was ended.  Pt. reports difficulty getting out of bed, intermittent tingling and constant pain in the L side of the low back and L posterior thigh to the knee.  Pt. now uses 600 mg ibuprofen PRN for pain.  Pt. reports symptoms are better with movement, worse first thing in the morning.  Pt. swims and walks for exercise.  Pt. works from home.  Pt. 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     Document Type  daily treatment     Patient/Family/Caregiver Comments/Observations  Pt. reports overall improvement in symptoms with regard to LB and LLE. Pt. reports she had a laser treatment for scar tissue to B knees yesterday and will have 3 more sessions in effort to improve knee ROM.     Start Time  1100     Stop Time  1200     Time Calculation (min)  60 min     Patient reported fall  since last visit  No        Pain Assessment    Currently in pain  Yes     Preferred Pain Scale  number (Numeric Rating Pain Scale)     Pain Side/Orientation  left     Pain Rating (0-10): Pre Activity  2     Pain Rating (0-10): Post Activity  2        Pain Intervention    Intervention   TE, LAD     Post Intervention Comments  no worse         Daily Treatment Assessment and Plan - 05/21/21 1123        Daily Treatment Assessment and Plan    Progress toward goals  Slower than expected     Daily Outcome Summary  Pt. reports she goes to gym and walks 5-7 days per week. States that she still has LLE pain but that it doesn't usually bother her until 4 am states at night she is able to sit and read or watch TV without having to change position due to pain.  Continued with active warm up x 10 min on UBE. Review of ROM and core stab. exs., added weight to bird dog and increased time with planks. B LAD 5 x 30 sec on/off.     Plan and Recommendations  Assess for progress check next week.             Today's Treatment:    ORTHO PT FLOWSHEET    Exercises Current Session Time   MODALITIES- HEAT/ICE TOTAL TIME FOR SESSION N/A   Heat To LB on bike   Ice/Vasocompression    MODALITIES - E-STIM TOTAL TIME FOR SESSION    E-stim/H-Wave    MODALITIES - US TOTAL TIME FOR SESSION    US    Iontophoresis    MODALITIES - TRACTION TOTAL TIME FOR SESSION    Mechanical Traction    THER ACT TOTAL TIME FOR SESSION    ROM karey and Oswestry Knee flex R 88 L 85 lumb ROM WNL Oswestry 13/50   Bed mobility    Transfer training    Body Mechanics/Work Training    Pt. education Pt. Education on current condition, POC, and prognosis.  Pt. Provided with initial HEP stretches (see media)   THER EX TOTAL TIME FOR SESSION 60 min   CARDIOVASCULAR     Nu Step    Stationary Bike  x 10 min no   Elliptical    Treadmill    UBE 10  min retro L 2- yes   STRENGTHENING    Supine ther-ex  PPT  Bridge  Clamshells  Hip extension (prone, standing)  Squatting  LTR with  p-ball  Bridge with feet on p-ball  Triple threat  Bird dog over p-ball  Bird dog q-ped  B leg lowering from 90 w/ ball sq     With G TB around knees (bridge with hip abduction) 2 x10 5s hold-no   GTB x 20 each-no  Over EOM 1x15 1.5# ankle weights - YES    no x10  5x10s-Yes  5s 2x10-yes  x10- yes  x10 1# UE/1.5# LE- yes  x10 1# UE/1.5# LE- yes  20x10s-yes   Prone quad stretch 3x30s-yes     Rows   Mid horiz abd  Mid w/ SLS  Low w/ SLS  pallof press    Planks modified  NO  GTB  2x10-no  2x10-no  2x10-no  x10 each- yes blue theratube    5 x 30s-yes   Seals + LE 10x10s- YES   P-ball I,Y,T 2x10 -no   Standing ther-ex    STRETCHING    LE stretching:  GSS  Squats on slant board  Hip flexor with strap  Hamstring stretch  Piriformis stretch 1/2 pigeon  ppu  natacha  Open book stretch   At slant board 3 x 30s-yes  Incline/decline x10-no  4x20s -yes  4x20s -yes  4x20-30s- yes  10x10s -no  3 min-no  5 x 10 s each-no   Long sitting HS/GSS yes 1 x30s   Stand ITB no 1x30s   q-ped cat/cow [id number]   Thoracic stretch w/ p-ball standing [id number]   Thoracic rotation X 10 each-no   Other stretching Tennis ball release L QL and L/s paraspinal in sidelying and supine-NO   REPEATED MOVEMENTS    NEURO RE-ED TOTAL TIME FOR SESSION Not performed   COORDINATION    POSTURAL RE-ED       PRE-GAIT ACTIVITIES     BALANCE TRAINING     Sitting balance    Standing balance    MANUAL TOTAL TIME FOR SESSION bolled with TE   Stretching LAD B 5 x 30 sec-yes  Instructed patient in self lumbar traction with foam roller 5 x 30s-NO   Mobilization     Massage:  Lumbosacral decompression, Piriformis release, Glute release L-S area pt. Prone over pillow-YES   Taping

## 2021-05-24 ENCOUNTER — OFFICE VISIT (OUTPATIENT)
Dept: PHYSICAL THERAPY | Facility: CLINIC | Age: 51
End: 2021-05-24
Payer: COMMERCIAL

## 2021-05-24 DIAGNOSIS — M76.892 ADHESIVE CAPSULITIS OF LEFT KNEE: Primary | ICD-10-CM

## 2021-05-24 DIAGNOSIS — M76.891 ADHESIVE CAPSULITIS OF RIGHT KNEE: ICD-10-CM

## 2021-05-24 PROCEDURE — 97140 MANUAL THERAPY 1/> REGIONS: CPT | Performed by: PHYSICAL THERAPIST

## 2021-05-24 NOTE — PROGRESS NOTES
Daily Note     Today's date: 2021  Patient name: Veronika Salazar  : 1970  MRN: [id number]  Referring provider: Tae Vigil PT  Dx:   Encounter Diagnosis     ICD-10-CM    1  Adhesive capsulitis of left knee  M76 892    2  Adhesive capsulitis of right knee  M76 891                   Subjective:  Pt reports being able to swim without knee pain this morning and was able to walk without pain at all until the very end this weekend  Objective:  See treatment diary below      Assessment:  Pt presented to outpatient physical therapy at [place] [address] with complaints of B knee pain  She presented with decreased range of motion, limited flexibility, decreased tolerance to activity and decreased functional mobility due to B knee adhesive capsulitis  She has long hx of B knee pain with multiple surgeries and manipulations  She will continue to ould benefit from skilled PT services in order to address these deficits and reach maximum level of function  Pt was able to achieve full L knee ext for the first time since surgery today and min increases in B knee flex  Plan:  Continue to increase B knee ROM           POC EXPIRES On:  21  PRECAUTIONS:  None  CO-MORBIDITES:  B TKA  PERSONAL FACTORS:  None      Manuals HEP           Laser B knees  9' 9'          Jt mobs B knees  3' 3'          PROM B knees flex, L knee ext  8' 8'          Patellar mobs B   3'          Neuro Re-Ed                                                                                                Ther Ex    Seated knee flex stretch sitting in chair                                                                                                        Ther Activity                              Gait Training                              Modalities

## 2021-05-26 ENCOUNTER — HOSPITAL ENCOUNTER (OUTPATIENT)
Dept: PHYSICAL THERAPY | Age: 51
Setting detail: THERAPIES SERIES
Discharge: HOME | End: 2021-05-26
Attending: NURSE PRACTITIONER
Payer: COMMERCIAL

## 2021-05-26 DIAGNOSIS — M54.32 LEFT SIDED SCIATICA: Primary | ICD-10-CM

## 2021-05-26 PROCEDURE — 97110 THERAPEUTIC EXERCISES: CPT | Mod: GP | Performed by: PHYSICAL THERAPIST

## 2021-05-26 PROCEDURE — 97530 THERAPEUTIC ACTIVITIES: CPT | Mod: GP | Performed by: PHYSICAL THERAPIST

## 2021-05-26 NOTE — OP PT TREATMENT LOG
ORTHO PT FLOWSHEET    Exercises Current Session Time   MODALITIES- HEAT/ICE TOTAL TIME FOR SESSION N/A   Heat To LB on bike   Ice/Vasocompression    MODALITIES - E-STIM TOTAL TIME FOR SESSION    E-stim/H-Wave    MODALITIES - US TOTAL TIME FOR SESSION    US    Iontophoresis    MODALITIES - TRACTION TOTAL TIME FOR SESSION    Mechanical Traction    THER ACT TOTAL TIME FOR SESSION 8-22 minutes   ROM karey and Oswestry Knee flex R 88 L 85 lumb ROM WNL Oswestry 13/50   Bed mobility    Transfer training    Body Mechanics/Work Training    Pt. education Pt. Education on current condition, POC, and prognosis.  Pt. Provided with initial HEP stretches (see media)   THER EX TOTAL TIME FOR SESSION 37-52  min   CARDIOVASCULAR     Nu Step    Stationary Bike No   Elliptical    Treadmill    UBE No   STRENGTHENING    Supine ther-ex  PPT  Bridge  Clamshells  Hip extension and abduction standing  Squatting  Wall squat  LTR with p-ball  Bridge with feet on p-ball  Triple threat  Bird dog over p-ball  Bird dog q-ped  B leg lowering from 90 w/ ball sq     With G TB around knees (bridge with hip abduction) 2 x10 5s hold-no  GTB x 20 each-no  R TB around knees  Yes 2 x10  [id number]  x15- yes  5s 2x10-no  x10- yes  x10 yes  x10 yes  20x10s-yes   Prone quad stretch [id number]     Rows   Mid horiz abd  Mid w/ SLS  Low w/ SLS  pallof press    Planks modified  NO  GTB  2x10-no  2x10-no  2x10-no  x10 each- yes blue theratube    5 x 15s-yes   Seals + LE 10x10s- no   P-ball I,Y,T 2x10 -no   Standing ther-ex    STRETCHING    LE stretching:  GSS  Squats on slant board  Hip flexor with strap  Hamstring stretch  Piriformis stretch 1/2 pigeon  ppu  natacha  Open book stretch   At slant board 3 x 30s-yes  Incline/decline x10-no  4x20s -yes  4x20s -yes  4x20-30s- yes  10x10s -no  3 min-no  5 x 10 s each-no   Long sitting HS/GSS no 1 x30s   Stand ITB no 1x30s   q-ped cat/cow [id number]   Thoracic stretch w/ p-ball standing [id number]   Thoracic rotation X 10 each-no    Other stretching Tennis ball release L QL and L/s paraspinal in sidelying and supine-NO   REPEATED MOVEMENTS    NEURO RE-ED TOTAL TIME FOR SESSION Not performed   COORDINATION    POSTURAL RE-ED       PRE-GAIT ACTIVITIES     BALANCE TRAINING     Sitting balance    Standing balance    MANUAL TOTAL TIME FOR SESSION Not performed   Stretching LAD B 5 x 30 sec-yes  Instructed patient in self lumbar traction with foam roller 5 x 30s-NO   Mobilization     Massage:  Lumbosacral decompression, Piriformis release, Glute release L-S area pt. Prone over pillow-YES   Taping

## 2021-05-26 NOTE — PROGRESS NOTES
PT DISCHARGE NOTE FOR OUTPATIENT THERAPY    Patient: Sharon Morales   MRN: [id number]  : 1970 51 y.o.  Referring Physician: Neetu Jang CRNP  Date of Visit: 2021      Certification Dates: 21 through 21    Total Visit Count: 16    Chief Complaints:  Chief Complaint   Patient presents with   • Difficulty Walking     More than 2.5 miles   • Pain     More of an ache       Precautions:  Existing Precautions/Restrictions: no known precautions/restrictions     TODAY'S VISIT:    General Information - 21 1309        Session Details    Document Type  discharge evaluation     Mode of Treatment  individual therapy;physical therapy     Patient/Family/Caregiver Comments/Observations  Since IE, pt. has had 16 sessions, and reports 88% improvements with symptoms.  Pt. reports improvements sciatic pain down the LLE, ability to walk about 2.5 miles, and sleep with little to no pain in the low back or LLE.  Pt. has had 2 laser treatment to remove scar tissue in B knees.  Pt. reports noticeable improvements in pain and ability to extend both legs with less pain.  Pt. has been compliant with HEP, and notes ability to I address residual symptoms at home.  Pt. notes she is ready for discharge today.     OP Specialty  Orthopedics        Time Calculation    Start Time  1300     Stop Time  1356     Time Calculation (min)  56 min        General Information    Onset of Illness/Injury or Date of Surgery  21     Referring Physician  Neetu Jang     Pertinent History of Current Functional Problem  Pt. complaints of L sided LBP and radiculopathy onset 2021.  Pt. reports symptoms are insidious in nature, and are worsening since onset.  Pt. had a telehealth appt 1.5 months ago and was prescribed a steroid taper pack, which initially provided relief, however symptoms returned after medication use was ended.  Pt. reports difficulty getting out of bed, intermittent tingling and constant pain in the  L side of the low back and L posterior thigh to the knee.  Pt. now uses 600 mg ibuprofen PRN for pain.  Pt. reports symptoms are better with movement, worse first thing in the morning.  Pt. swims and walks for exercise.  Pt. works from home.  Pt. 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     Existing Precautions/Restrictions  no known precautions/restrictions         Daily Falls Screen - 05/26/21 1314        Daily Falls Assessment    Patient reported fall since last visit  No         Pain/Vitals - 05/26/21 1308        Pain Assessment    Currently in pain  No/Denies        Pain Intervention    Intervention   NA     Post Intervention Comments  See assessment         PT - 05/26/21 1314        Physical Therapy    Physical Therapy  Ortho        PT Plan    Frequency of treatment  2 times/week     PT Duration  6 weeks     PT Cert From  04/02/21     PT Cert To  07/01/21     Date PT POC was sent to provider  04/02/21     Signed PT Plan of Care received?   Yes         Daily Treatment Assessment and Plan - 05/26/21 1400        Daily Treatment Assessment and Plan    Progress toward goals  Progressing     Daily Outcome Summary  Pt. had a total of 16 sessions of physical therapy that focused on improving lumbopelvic and BLE mobility, strengthening, and stability.  Pt. demonstrated improved CHACHA, with 16% change since IE.Pt. demonstrated improved lumbar AROM, hips strength, and core stability.  As a result, pt. is able to perform usual tasks at home, exercise, and walk to little to no pain.  PT reviewed and updated pt.'s discharge HEP, and provided handout. PT. also discussed how to progress exercises per pt. tolerance.  Pt. met all but 2 goals, and therefore is discharged to an independent plan. Pt. denies pain post session.     Plan and Recommendations  Pt. discharged to an independent HEP.           OBJECTIVE  MEASUREMENTS/DATA:    Special Tests    Special Tests - 05/26/21 1315        Outcome measures tracking    Outcome measure used:  CHACHA: 12%         ROM   Range of Motion - 05/26/21 1300        RIGHT: Lower Extremity AROM Assessment    Knee Flexion Deficit  75     Knee Extension Deficit  0        LEFT: Lower Extremity AROM Assessment    Knee Flexion Deficit  75     Knee Extension Deficit  10        Lumbar AROM    Lumbar Flexion  --    WFL can touch toes without pain    Lumbar Extension  --    50% limit no pain    Lumbar Left Rotation  --    WNL    Lumbar Right Rotation  --    WNL, no pain        MMT   Manual Muscle Tests - 05/26/21 1322        RIGHT: Lower Extremity Manual Muscle Test Assessment    Hip Extension gross movement  (4+/5) good plus     Hip Abduction gross movement  (4+/5) good plus        LEFT: Lower Extremity Manual Muscle Test Assessment    Hip Flexion gross movement  (5/5) normal     Hip Extension gross movement  (4+/5) good plus     Hip Abduction gross movement  (4+/5) good plus        Additional Manual Muscle Tests    Additional MMT  TrA: 4+/5           Today's Treatment:    ORTHO PT FLOWSHEET    Exercises Current Session Time   MODALITIES- HEAT/ICE TOTAL TIME FOR SESSION N/A   Heat To LB on bike   Ice/Vasocompression    MODALITIES - E-STIM TOTAL TIME FOR SESSION    E-stim/H-Wave    MODALITIES - US TOTAL TIME FOR SESSION    US    Iontophoresis    MODALITIES - TRACTION TOTAL TIME FOR SESSION    Mechanical Traction    THER ACT TOTAL TIME FOR SESSION 8-22 minutes   ROM karey and Oswestry Knee flex R 88 L 85 lumb ROM WNL Oswestry 13/50   Bed mobility    Transfer training    Body Mechanics/Work Training    Pt. education Pt. Education on current condition, POC, and prognosis.  Pt. Provided with initial HEP stretches (see media)   THER EX TOTAL TIME FOR SESSION 37-52  min   CARDIOVASCULAR     Nu Step    Stationary Bike No   Elliptical    Treadmill    UBE No   STRENGTHENING    Supine  ther-ex  PPT  Bridge  Clamshells  Hip extension and abduction standing  Squatting  Wall squat  LTR with p-ball  Bridge with feet on p-ball  Triple threat  Bird dog over p-ball  Bird dog q-ped  B leg lowering from 90 w/ ball sq     With G TB around knees (bridge with hip abduction) 2 x10 5s hold-no  GTB x 20 each-no  R TB around knees  Yes 2 x10  [id number]  x15- yes  5s 2x10-no  x10- yes  x10 yes  x10 yes  20x10s-yes   Prone quad stretch [id number]     Rows   Mid horiz abd  Mid w/ SLS  Low w/ SLS  pallof press    Planks modified  NO  GTB  2x10-no  2x10-no  2x10-no  x10 each- yes blue theratube    5 x 15s-yes   Seals + LE 10x10s- no   P-ball I,Y,T 2x10 -no   Standing ther-ex    STRETCHING    LE stretching:  GSS  Squats on slant board  Hip flexor with strap  Hamstring stretch  Piriformis stretch 1/2 pigeon  ppu  natacha  Open book stretch   At slant board 3 x 30s-yes  Incline/decline x10-no  4x20s -yes  4x20s -yes  4x20-30s- yes  10x10s -no  3 min-no  5 x 10 s each-no   Long sitting HS/GSS no 1 x30s   Stand ITB no 1x30s   q-ped cat/cow [id number]   Thoracic stretch w/ p-ball standing [id number]   Thoracic rotation X 10 each-no   Other stretching Tennis ball release L QL and L/s paraspinal in sidelying and supine-NO   REPEATED MOVEMENTS    NEURO RE-ED TOTAL TIME FOR SESSION Not performed   COORDINATION    POSTURAL RE-ED       PRE-GAIT ACTIVITIES     BALANCE TRAINING     Sitting balance    Standing balance    MANUAL TOTAL TIME FOR SESSION Not performed   Stretching LAD B 5 x 30 sec-yes  Instructed patient in self lumbar traction with foam roller 5 x 30s-NO   Mobilization     Massage:  Lumbosacral decompression, Piriformis release, Glute release L-S area pt. Prone over pillow-YES   Taping         Goals Addressed                    This Visit's Progress    •  COMPLETED: To be able to get out of bed with less pain (pt-stated)         STG  1. Patient will be able to perform bed mobility with 4/10 pain or less in 4 weeks. MET (0/10  pain, no difficulty)  2. Patient will improve CHACHA score by 10% in 4 weeks. (MET)  3. Patient will be able to tolerate functional tasks (bending and lifting) with 4/10 pain or less in 4 weeks. (MET)    LT. Patient will be able to perform transfers without pain in 6 weeks. (MET)  2. Patient will improve CHACHA score by 20% in 6 weeks. (16% change at D/C)  3. Patient will be able to tolerate functional tasks without pain or ROM restrictions in 8 weeks. (ONGOING)             Yes    Learner name:  Sharon    Relationship: Patient    Learning Barriers:  Learning barriers: No Barriers    Preferred Language: English     Needed: No    Learning New Concepts: Listening, Reading, Demonstration and Pictures/Video    Education Provided: yes      CO-LEARNER ASSESSMENT:    Completed: No          Clinical Impression: Pt. had a total of 16 sessions of physical therapy that focused on improving lumbopelvic and BLE mobility, strengthening, and stability. Pt. demonstrated improved CHACHA, with 16% change since IE.Pt. demonstrated improved lumbar AROM, hips strength, and core stability. As a result, pt. is able to perform usual tasks at home, exercise, and walk to little to no pain. PT reviewed and updated pt.'s discharge HEP, and provided handout. PT. also discussed how to progress exercises per pt. tolerance. Pt. met all but 2 goals, and therefore is discharged to an independent plan. Pt. denies pain post session.    Discharge information for [facility]:    Reason for D/C: Goals met  Hospitalization during treatment: No  Increased independence: [place] in HEP, Increased functional activity, Increased functional independence  Increased production assessment: Return to household activities, Return to participation in hobbies/leisure pursuits  Interrupted for medical reason: No

## 2021-05-28 ENCOUNTER — OFFICE VISIT (OUTPATIENT)
Dept: PHYSICAL THERAPY | Facility: CLINIC | Age: 51
End: 2021-05-28
Payer: COMMERCIAL

## 2021-05-28 DIAGNOSIS — M76.891 ADHESIVE CAPSULITIS OF RIGHT KNEE: ICD-10-CM

## 2021-05-28 DIAGNOSIS — M76.892 ADHESIVE CAPSULITIS OF LEFT KNEE: Primary | ICD-10-CM

## 2021-05-28 PROCEDURE — 97140 MANUAL THERAPY 1/> REGIONS: CPT | Performed by: PHYSICAL THERAPIST

## 2021-05-28 NOTE — PROGRESS NOTES
Daily Note     Today's date: 2021  Patient name: Dilcia Abraham  : 1970  MRN: [id number]  Referring provider: Chastity Love, KALANI  Dx:   Encounter Diagnosis     ICD-10-CM    1  Adhesive capsulitis of left knee  M76 892    2  Adhesive capsulitis of right knee  M76 891                   Subjective:  Pt reports being able to swim without knee pain  Able to walk down steps with much less R knee pain this week  Min more R knee flexion too  Objective:  See treatment diary      Assessment:  Pt presented to outpatient physical therapy at [place] [address] with complaints of B knee pain  She presented with decreased range of motion, limited flexibility, decreased tolerance to activity and decreased functional mobility due to B knee adhesive capsulitis  She has long hx of B knee pain with multiple surgeries and manipulations  She will continue to benefit from skilled PT services in order to address these deficits and reach maximum level of function  Pt continues to achieve full L knee ext and min more R knee flex this week  Able to swim and descend steps more functionally with less pain  Plan:  Continue to increase B knee ROM  PT 2x/wk x 1 more week          POC EXPIRES On:  21  PRECAUTIONS:  None  CO-MORBIDITES:  B TKA  PERSONAL FACTORS:  None      Manuals HEP          Laser B knees  9' 9' 9'         Jt mobs B knees  3' 3' 3'         PROM B knees flex, L knee ext  8' 8' 8'         Patellar mobs B   3' 3'         Neuro Re-Ed                                                                                                Ther Ex    Seated knee flex stretch sitting in chair                                                                                                        Ther Activity                              Gait Training                              Modalities

## 2021-06-01 ENCOUNTER — OFFICE VISIT (OUTPATIENT)
Dept: PHYSICAL THERAPY | Facility: CLINIC | Age: 51
End: 2021-06-01
Payer: COMMERCIAL

## 2021-06-01 DIAGNOSIS — M76.892 ADHESIVE CAPSULITIS OF LEFT KNEE: Primary | ICD-10-CM

## 2021-06-01 DIAGNOSIS — M76.891 ADHESIVE CAPSULITIS OF RIGHT KNEE: ICD-10-CM

## 2021-06-01 PROCEDURE — 97140 MANUAL THERAPY 1/> REGIONS: CPT | Performed by: PHYSICAL THERAPIST

## 2021-06-01 NOTE — PROGRESS NOTES
Daily Note     Today's date: 2021  Patient name: Linda Noyola  : 1970  MRN: [id number]  Referring provider: Lendell Castleman, PT  Dx:   Encounter Diagnosis     ICD-10-CM    1  Adhesive capsulitis of left knee  M76 892    2  Adhesive capsulitis of right knee  M76 891                   Subjective:  Pt reports being able to swim without knee pain  Able to walk down steps with much less R knee pain this week  PT is helping to increase ROM  Objective:  See treatment diary      Assessment:  Pt presented to outpatient physical therapy at [place] [address] with complaints of B knee pain  She presented with decreased range of motion, limited flexibility, decreased tolerance to activity and decreased functional mobility due to B knee adhesive capsulitis  She has long hx of B knee pain with multiple surgeries and manipulations  She will continue to benefit from skilled PT services in order to address these deficits and reach maximum level of function  Pt continues to achieve full L knee ext and min more R knee flex after every session  Able to swim and descend steps more functionally with less pain  Plan:  Continue to increase B knee ROM  PT 2x/wk x 2 more weeks          POC EXPIRES On:  21  PRECAUTIONS:  None  CO-MORBIDITES:  B TKA  PERSONAL FACTORS:  None      Manuals HEP         Laser B knees  9' 9' 9' 9'        Jt mobs B knees  3' 3' 3' 3'        PROM B knees flex, L knee ext  8' 8' 8' 8'        Patellar mobs B   3' 3' 3'        Neuro Re-Ed                                                                                                Ther Ex    Seated knee flex stretch sitting in chair                                                                                                        Ther Activity                              Gait Training                              Modalities

## 2021-06-03 ENCOUNTER — OFFICE VISIT (OUTPATIENT)
Dept: PHYSICAL THERAPY | Facility: CLINIC | Age: 51
End: 2021-06-03
Payer: COMMERCIAL

## 2021-06-03 DIAGNOSIS — M76.892 ADHESIVE CAPSULITIS OF LEFT KNEE: Primary | ICD-10-CM

## 2021-06-03 DIAGNOSIS — M76.891 ADHESIVE CAPSULITIS OF RIGHT KNEE: ICD-10-CM

## 2021-06-03 PROCEDURE — 97140 MANUAL THERAPY 1/> REGIONS: CPT | Performed by: PHYSICAL THERAPIST

## 2021-06-03 NOTE — PROGRESS NOTES
Daily Note     Today's date: 6/3/2021  Patient name: Agus Wayne  : 1970  MRN: [id number]  Referring provider: Rama Ball PT  Dx:   Encounter Diagnosis     ICD-10-CM    1  Adhesive capsulitis of left knee  M76 892    2  Adhesive capsulitis of right knee  M76 891                   Subjective:  Pt reports continued ability to swim without knee pain  Able to walk down steps with much less R knee pain too  PT is helping to increase ROM  Objective:  See treatment diary      Assessment:  Pt presented to outpatient physical therapy at [place] [address] with complaints of B knee pain  She presented with decreased range of motion, limited flexibility, decreased tolerance to activity and decreased functional mobility due to B knee adhesive capsulitis  She has long hx of B knee pain with multiple surgeries and manipulations  She will continue to benefit from skilled PT services in order to address these deficits and reach maximum level of function  Pt continues to achieve full L knee ext and min more R knee flex after every session  Able to swim and descend steps more functionally with less pain  Plan:  Continue to increase B knee ROM  PT 2 more sessions prior to D/C on 21           POC EXPIRES On:  21  PRECAUTIONS:  None  CO-MORBIDITES:  B TKA  PERSONAL FACTORS:  None      Manuals HEP 5/20 5/24 5/28 6/1 6/3       Laser B knees  9' 9' 9' 9' 9'       Jt mobs B knees  3' 3' 3' 3' 3'       PROM B knees flex, L knee ext  8' 8' 8' 8' 8'       Patellar mobs B   3' 3' 3' 3'       Neuro Re-Ed                                                                                                Ther Ex    Seated knee flex stretch sitting in chair                                                                                                        Ther Activity                              Gait Training                              Modalities

## 2021-06-08 ENCOUNTER — OFFICE VISIT (OUTPATIENT)
Dept: PHYSICAL THERAPY | Facility: CLINIC | Age: 51
End: 2021-06-08
Payer: COMMERCIAL

## 2021-06-08 DIAGNOSIS — M76.891 ADHESIVE CAPSULITIS OF RIGHT KNEE: ICD-10-CM

## 2021-06-08 DIAGNOSIS — M76.892 ADHESIVE CAPSULITIS OF LEFT KNEE: Primary | ICD-10-CM

## 2021-06-08 PROCEDURE — 97140 MANUAL THERAPY 1/> REGIONS: CPT | Performed by: PHYSICAL THERAPIST

## 2021-06-08 NOTE — PROGRESS NOTES
Daily Note     Today's date: 2021  Patient name: Vicky Rob  : 1970  MRN: [id number]  Referring provider: Rick Oliveros, PT  Dx:   Encounter Diagnosis     ICD-10-CM    1  Adhesive capsulitis of left knee  M76 892    2  Adhesive capsulitis of right knee  M76 891                   Subjective:  Pt reports continued improvement with PT  Able to walk down steps with much less R knee pain too  PT is helping to increase ROM  Objective:  See treatment diary      Assessment:  Pt presented to outpatient physical therapy at [place] [address] with complaints of B knee pain  She presented with decreased range of motion, limited flexibility, decreased tolerance to activity and decreased functional mobility due to B knee adhesive capsulitis  She has long hx of B knee pain with multiple surgeries and manipulations  She will continue to benefit from skilled PT services in order to address these deficits and reach maximum level of function  Pt continues to achieve full L knee ext and min more R knee flex after every session  Able to swim and descend steps more functionally with less pain  Plan:  Continue to increase B knee ROM  Possible D/C on 21           POC EXPIRES On:  21  PRECAUTIONS:  None  CO-MORBIDITES:  B TKA  PERSONAL FACTORS:  None      Manuals HEP 5/20 5/24 5/28 6/1 6/3 6/8      Laser B knees  9' 9' 9' 9' 9' 9'      Jt mobs B knees  3' 3' 3' 3' 3' 3'      PROM B knees flex, L knee ext  8' 8' 8' 8' 8' 8'      Patellar mobs B   3' 3' 3' 3' 3'      Neuro Re-Ed                                                                                                Ther Ex    Seated knee flex stretch sitting in chair                                                                                                        Ther Activity                              Gait Training                              Modalities

## 2021-06-09 DIAGNOSIS — D72.819 LEUKOPENIA, UNSPECIFIED TYPE: Primary | ICD-10-CM

## 2021-06-09 DIAGNOSIS — D64.9 ANEMIA, UNSPECIFIED TYPE: ICD-10-CM

## 2021-06-09 DIAGNOSIS — E03.9 HYPOTHYROIDISM, UNSPECIFIED TYPE: ICD-10-CM

## 2021-06-09 NOTE — TELEPHONE ENCOUNTER
Patient is going tomorrow at 8am [facility] to get repeat labs completed. On last labs it stated to have the patient get repeat thyroid levels and blood counts. Did not know if you wanted anymore labs completed for just cbc and tsh, t4?

## 2021-06-11 LAB
BASOPHILS # BLD AUTO: 20 CELLS/UL (ref 0–200)
BASOPHILS NFR BLD AUTO: 0.8 %
EOSINOPHIL # BLD AUTO: 150 CELLS/UL (ref 15–500)
EOSINOPHIL NFR BLD AUTO: 6 %
ERYTHROCYTE [DISTWIDTH] IN BLOOD BY AUTOMATED COUNT: 12.8 % (ref 11–15)
HCT VFR BLD AUTO: 43.5 % (ref 35–45)
HGB BLD-MCNC: 14.1 G/DL (ref 11.7–15.5)
LYMPHOCYTES # BLD AUTO: 985 CELLS/UL (ref 850–3900)
LYMPHOCYTES NFR BLD AUTO: 39.4 %
MCH RBC QN AUTO: 31.1 PG (ref 27–33)
MCHC RBC AUTO-ENTMCNC: 32.4 G/DL (ref 32–36)
MCV RBC AUTO: 96 FL (ref 80–100)
MONOCYTES # BLD AUTO: 290 CELLS/UL (ref 200–950)
MONOCYTES NFR BLD AUTO: 11.6 %
NEUTROPHILS # BLD AUTO: 1055 CELLS/UL (ref 1500–7800)
NEUTROPHILS NFR BLD AUTO: 42.2 %
PLATELET # BLD AUTO: 227 THOUSAND/UL (ref 140–400)
PMV BLD REES-ECKER: 10.8 FL (ref 7.5–12.5)
RBC # BLD AUTO: 4.53 MILLION/UL (ref 3.8–5.1)
T4 FREE SERPL-MCNC: 1.6 NG/DL (ref 0.8–1.8)
TSH SERPL-ACNC: 0.2 MIU/L
WBC # BLD AUTO: 2.5 THOUSAND/UL (ref 3.8–10.8)

## 2021-06-13 ENCOUNTER — TELEPHONE (OUTPATIENT)
Dept: PRIMARY CARE | Facility: CLINIC | Age: 51
End: 2021-06-13

## 2021-06-13 DIAGNOSIS — D72.9 ABNORMAL WHITE BLOOD CELL (WBC) COUNT: ICD-10-CM

## 2021-06-13 DIAGNOSIS — E03.9 HYPOTHYROIDISM, UNSPECIFIED TYPE: Primary | ICD-10-CM

## 2021-06-13 RX ORDER — LEVOTHYROXINE SODIUM 100 UG/1
TABLET ORAL
Qty: 90 TABLET | Refills: 0 | Status: SHIPPED | OUTPATIENT
Start: 2021-06-13 | End: 2021-09-07

## 2021-06-17 ENCOUNTER — OFFICE VISIT (OUTPATIENT)
Dept: PHYSICAL THERAPY | Facility: CLINIC | Age: 51
End: 2021-06-17
Payer: COMMERCIAL

## 2021-06-17 DIAGNOSIS — M76.891 ADHESIVE CAPSULITIS OF RIGHT KNEE: ICD-10-CM

## 2021-06-17 DIAGNOSIS — M76.892 ADHESIVE CAPSULITIS OF LEFT KNEE: Primary | ICD-10-CM

## 2021-06-17 PROCEDURE — 97140 MANUAL THERAPY 1/> REGIONS: CPT | Performed by: PHYSICAL THERAPIST

## 2021-06-17 NOTE — PROGRESS NOTES
PT Discharge    Today's date: 2021  Patient name: Thierry Yost  : 1970  MRN: [id number]  Referring provider: Dusty Gonzalez, PT  Dx:   Encounter Diagnosis     ICD-10-CM    1  Adhesive capsulitis of left knee  M76 892    2  Adhesive capsulitis of right knee  M76 891                   Assessment  Assessment details: Thierry Yost is a 46 y o  female who presented to outpatient physical therapy at [place] [address] with complaints of B knee pain  She presented with decreased range of motion, limited flexibility, decreased tolerance to activity and decreased functional mobility due to B knee adhesive capsulitis  She has long hx of B knee pain with multiple surgeries and manipulations  She has met her goals and has more knee ROM without any pain  She can even descend steps normally now  She is ready for D/C to HEP at this time  Barriers to therapy: None  Understanding of Dx/Px/POC: excellent   Prognosis: good    Goals  ST  Increase ROM B knee flex PROM - Met  1  Independent with HEP in 2 weeksy 5 degrees in 2 weeks - Met    LT  Achieve FOTO score of 65/100 in 4 weeks - Met  2  Able to ambulate stairs normallly in 4 weeks - Met      Plan  Other planned modality interventions: laser  Treatment plan discussed with: patient        Subjective Evaluation    History of Present Illness  Mechanism of injury: Pt reports having B knee pain for many years  Had thigh surgery as an infant  In 2018 had B TKA with 2 manipulations in 2018 and 2018 for both without success  Had revision in 2019 of R knee and 2019 for L  Works as a market researcher full time  Swims 3-4x/wk, but her poor ROM limited her on steps, but no longer  No longer having LBP due to the way she ambulates             Recurrent probem    Quality of life: good    Pain  Current pain ratin  At best pain ratin  At worst pain ratin  Progression: improved    Social Support  Steps to enter house: yes  Stairs in house: yes   Lives in: multiple-level home    Employment status: working    Diagnostic Tests  No diagnostic tests performed  Treatments  Current treatment: physical therapy        Objective     Observations   Left Knee   Negative for effusion  Right Knee   Negative for effusion  Additional Observation Details  Multiple healed scars B knees  Neurological Testing     Sensation     Knee   Left Knee   Intact: light touch    Right Knee   Intact: light touch     Active Range of Motion   Left Knee   Flexion: 95 degrees   Extension: 0 degrees   Extensor la degrees     Right Knee   Flexion: 93 degrees   Extension: 0 degrees   Extensor la degrees     Passive Range of Motion   Left Knee   Flexion: 96 degrees   Extension: 0 degrees     Right Knee   Flexion: 94 degrees   Extension: 0 degrees     Mobility   Tibiofemoral Mobility:   Left knee Hypomobile in the anterior and posterior tibiofemoral tendon(s)  Right knee Hypomobile in the anterior and posterior tibiofemoral tendon(s)       Strength/Myotome Testing     Left Knee   Normal strength    Right Knee   Normal strength    Ambulation     Comments          POC EXPIRES On:  21  PRECAUTIONS:  None  CO-MORBIDITES:  B TKA  PERSONAL FACTORS:  None      Manuals HEP 5/20 5/24 5/28 6/1 6/3 6/8 6/17     Laser B knees  9' 9' 9' 9' 9' 9' 9'     Jt mobs B knees  3' 3' 3' 3' 3' 3' 3'     PROM B knees flex, L knee ext  8' 8' 8' 8' 8' 8' 8'     Patellar mobs B   3' 3' 3' 3' 3' 3'     Neuro Re-Ed                                                                                                Ther Ex    Seated knee flex stretch sitting in chair                                                                                                        Ther Activity                              Gait Training                              Modalities

## 2021-06-30 ENCOUNTER — TELEPHONE (OUTPATIENT)
Dept: PRIMARY CARE | Facility: CLINIC | Age: 51
End: 2021-06-30

## 2021-06-30 RX ORDER — POLYMYXIN B SULFATE AND TRIMETHOPRIM 1; 10000 MG/ML; [USP'U]/ML
1 SOLUTION OPHTHALMIC
Qty: 10 ML | Refills: 0 | Status: SHIPPED | OUTPATIENT
Start: 2021-06-30 | End: 2021-07-07

## 2021-06-30 NOTE — TELEPHONE ENCOUNTER
Please let her know called in abx eye drops to use along with warm compresses and not better will need OV

## 2021-06-30 NOTE — TELEPHONE ENCOUNTER
Patient called and wants to know if she can be called in something for pink eye. Patient has 4 kids and woke up this am with pink eye symptoms, red, itching, irritated with minimal crusting. Offered patient appointment to be seen today, but unable to come in and leaving for the beach tomorrow until Tuesday.

## 2021-07-08 RX ORDER — CONJUGATED ESTROGENS 0.62 MG/G
0.5 CREAM VAGINAL DAILY
Qty: 30 G | Refills: 5 | Status: SHIPPED | OUTPATIENT
Start: 2021-07-08 | End: 2021-08-03

## 2021-08-03 ENCOUNTER — OFFICE VISIT (OUTPATIENT)
Dept: PRIMARY CARE | Facility: CLINIC | Age: 51
End: 2021-08-03
Payer: COMMERCIAL

## 2021-08-03 VITALS
HEART RATE: 78 BPM | BODY MASS INDEX: 25.5 KG/M2 | TEMPERATURE: 98.5 F | WEIGHT: 118.2 LBS | DIASTOLIC BLOOD PRESSURE: 70 MMHG | RESPIRATION RATE: 14 BRPM | SYSTOLIC BLOOD PRESSURE: 100 MMHG | OXYGEN SATURATION: 98 % | HEIGHT: 57 IN

## 2021-08-03 DIAGNOSIS — H10.33 ACUTE CONJUNCTIVITIS OF BOTH EYES, UNSPECIFIED ACUTE CONJUNCTIVITIS TYPE: Primary | ICD-10-CM

## 2021-08-03 PROCEDURE — 3008F BODY MASS INDEX DOCD: CPT | Performed by: NURSE PRACTITIONER

## 2021-08-03 PROCEDURE — 99213 OFFICE O/P EST LOW 20 MIN: CPT | Performed by: NURSE PRACTITIONER

## 2021-08-03 RX ORDER — ESTRADIOL 0.1 MG/G
CREAM VAGINAL
COMMUNITY
Start: 2021-07-21

## 2021-08-03 RX ORDER — OLOPATADINE HYDROCHLORIDE 2 MG/ML
1 SOLUTION/ DROPS OPHTHALMIC DAILY
Qty: 5 ML | Refills: 1 | Status: SHIPPED | OUTPATIENT
Start: 2021-08-03 | End: 2022-09-06 | Stop reason: ENTERED-IN-ERROR

## 2021-08-03 RX ORDER — CIPROFLOXACIN HYDROCHLORIDE 3 MG/ML
2 SOLUTION/ DROPS OPHTHALMIC
Qty: 5 ML | Refills: 0 | Status: SHIPPED | OUTPATIENT
Start: 2021-08-03 | End: 2021-09-03 | Stop reason: ENTERED-IN-ERROR

## 2021-08-03 ASSESSMENT — ENCOUNTER SYMPTOMS
SINUS PAIN: 0
MYALGIAS: 0
COUGH: 0
HEMATOLOGIC/LYMPHATIC NEGATIVE: 1
CHEST TIGHTNESS: 0
SHORTNESS OF BREATH: 0
EYE REDNESS: 1
CONFUSION: 0
NEUROLOGICAL NEGATIVE: 1
CHILLS: 0
ALLERGIC/IMMUNOLOGIC NEGATIVE: 1
SORE THROAT: 0
WOUND: 0
ABDOMINAL PAIN: 0
DIAPHORESIS: 0
AGITATION: 0
EYE DISCHARGE: 1
EYE ITCHING: 1
NAUSEA: 0
FATIGUE: 0
PALPITATIONS: 0
ENDOCRINE NEGATIVE: 1
DIARRHEA: 0
FEVER: 0
SINUS PRESSURE: 0

## 2021-08-03 NOTE — PROGRESS NOTES
"      Subjective      Patient ID: Sharon Morales is a 51 y.o. female.  1970      Pt here for c/o right eye itching, redness, and mild discharge with waxing and waning x 1-2 months.  Pt has been using Trimethoprim antibiotic drops for bacterial conjunctivitis and reports her eyes improve then after a few days the redness returns.  Pt admits to swimming 3x per week in indoor chlorinated pool.  Pt admits to hx of using allergy eye drops.  Pt admits to contact w/ nephew who was tx for bacterial conjunctivitis in the beginning of July. Pt reports she believes it is bacterial conjuctivitis.       The following have been reviewed and updated as appropriate in this visit:  Allergies  Meds  Problems       Review of Systems   Constitutional: Negative for chills, diaphoresis, fatigue and fever.   HENT: Negative for congestion, ear pain, sinus pressure, sinus pain, sneezing and sore throat.    Eyes: Positive for discharge, redness and itching.   Respiratory: Negative for cough, chest tightness and shortness of breath.    Cardiovascular: Negative for chest pain and palpitations.   Gastrointestinal: Negative for abdominal pain, diarrhea and nausea.   Endocrine: Negative.    Genitourinary: Negative.    Musculoskeletal: Negative for myalgias.   Skin: Negative for rash and wound.   Allergic/Immunologic: Negative.    Neurological: Negative.    Hematological: Negative.    Psychiatric/Behavioral: Negative for agitation, behavioral problems, confusion, self-injury and suicidal ideas.       Objective     Vitals:    08/03/21 1430   BP: 100/70   BP Location: Left upper arm   Patient Position: Sitting   Pulse: 78   Resp: 14   Temp: 36.9 °C (98.5 °F)   TempSrc: Oral   SpO2: 98%   Weight: 53.6 kg (118 lb 3.2 oz)   Height: 1.448 m (4' 9\")     Body mass index is 25.58 kg/m².    Physical Exam  Constitutional:       General: She is not in acute distress.     Appearance: Normal appearance. She is not ill-appearing or toxic-appearing.   HENT: "      Head: Normocephalic.      Right Ear: Hearing, tympanic membrane, ear canal and external ear normal.      Left Ear: Hearing, tympanic membrane, ear canal and external ear normal.      Nose: Nose normal.      Mouth/Throat:      Pharynx: Uvula midline. No posterior oropharyngeal erythema.   Eyes:      General: Lids are normal.         Right eye: No discharge.         Left eye: No discharge.      Extraocular Movements: Extraocular movements intact.      Conjunctiva/sclera:      Right eye: Right conjunctiva is injected. No exudate.     Left eye: Left conjunctiva is injected. No exudate.     Pupils: Pupils are equal, round, and reactive to light.   Neck:      Trachea: Trachea normal.   Cardiovascular:      Rate and Rhythm: Regular rhythm.      Heart sounds: S1 normal and S2 normal.   Pulmonary:      Effort: Pulmonary effort is normal.      Breath sounds: Normal breath sounds.   Abdominal:      General: Bowel sounds are normal.      Tenderness: There is no abdominal tenderness.   Musculoskeletal:      Cervical back: Normal range of motion.   Skin:     General: Skin is warm and dry.   Neurological:      Mental Status: She is alert and oriented to person, place, and time.   Psychiatric:         Speech: Speech normal.         Behavior: Behavior normal. Behavior is cooperative.         Thought Content: Thought content normal.         Judgment: Judgment normal.         Assessment/Plan   Diagnoses and all orders for this visit:    Acute conjunctivitis of both eyes, unspecified acute conjunctivitis type (Primary)  -     ciprofloxacin (CILOXAN) 0.3 % ophthalmic solution; Administer 2 drops into both eyes every 4 (four) hours while awake for 7 days.  -     olopatadine (PATADAY) 0.2 % ophthalmic solution; Administer 1 drop into both eyes daily.

## 2021-08-03 NOTE — PATIENT INSTRUCTIONS
Please use Cipro eye drops in both eyes and do not swim until eyes are recovered.  If antibiotic treatment does not resolve issue, consider using allergy eye drops.  Use new eye goggles when you return to the pool.    Thank you for choosing [facility].

## 2021-09-03 ENCOUNTER — OFFICE VISIT (OUTPATIENT)
Dept: PRIMARY CARE | Facility: CLINIC | Age: 51
End: 2021-09-03
Payer: COMMERCIAL

## 2021-09-03 VITALS
OXYGEN SATURATION: 99 % | TEMPERATURE: 98 F | DIASTOLIC BLOOD PRESSURE: 80 MMHG | HEART RATE: 76 BPM | RESPIRATION RATE: 16 BRPM | SYSTOLIC BLOOD PRESSURE: 106 MMHG | BODY MASS INDEX: 24.77 KG/M2 | HEIGHT: 58 IN | WEIGHT: 118 LBS

## 2021-09-03 DIAGNOSIS — E03.9 HYPOTHYROIDISM, UNSPECIFIED TYPE: ICD-10-CM

## 2021-09-03 DIAGNOSIS — Z00.00 ROUTINE PHYSICAL EXAMINATION: Primary | ICD-10-CM

## 2021-09-03 DIAGNOSIS — D72.819 LEUKOPENIA, UNSPECIFIED TYPE: ICD-10-CM

## 2021-09-03 PROBLEM — H04.123 DRY EYES: Status: ACTIVE | Noted: 2021-09-03

## 2021-09-03 PROBLEM — M54.32 LEFT SIDED SCIATICA: Status: RESOLVED | Noted: 2021-04-02 | Resolved: 2021-09-03

## 2021-09-03 PROBLEM — E53.8 VITAMIN B12 DEFICIENCY: Status: RESOLVED | Noted: 2019-01-29 | Resolved: 2021-09-03

## 2021-09-03 PROBLEM — Z91.89 AT RISK FOR VENOUS THROMBOEMBOLISM (VTE): Status: RESOLVED | Noted: 2018-12-18 | Resolved: 2021-09-03

## 2021-09-03 PROCEDURE — 3008F BODY MASS INDEX DOCD: CPT | Performed by: FAMILY MEDICINE

## 2021-09-03 PROCEDURE — 99396 PREV VISIT EST AGE 40-64: CPT | Performed by: FAMILY MEDICINE

## 2021-09-03 ASSESSMENT — ENCOUNTER SYMPTOMS
CHILLS: 0
DYSURIA: 0
BLOOD IN STOOL: 0
NERVOUS/ANXIOUS: 0
WHEEZING: 0
FREQUENCY: 0
PALPITATIONS: 0
FEVER: 0
SORE THROAT: 0
COUGH: 0
HEADACHES: 0
RHINORRHEA: 0
ARTHRALGIAS: 0
ABDOMINAL PAIN: 0
HEMATURIA: 0
CONSTIPATION: 0
DIZZINESS: 0
FATIGUE: 0
SHORTNESS OF BREATH: 0
SLEEP DISTURBANCE: 0
MYALGIAS: 0

## 2021-09-03 ASSESSMENT — PATIENT HEALTH QUESTIONNAIRE - PHQ9: SUM OF ALL RESPONSES TO PHQ9 QUESTIONS 1 & 2: 0

## 2021-10-20 LAB
ALBUMIN SERPL-MCNC: 4.5 G/DL (ref 3.6–5.1)
ALBUMIN/GLOB SERPL: 1.9 (CALC) (ref 1–2.5)
ALP SERPL-CCNC: 82 U/L (ref 37–153)
ALT SERPL-CCNC: 14 U/L (ref 6–29)
AST SERPL-CCNC: 22 U/L (ref 10–35)
BASOPHILS # BLD AUTO: 30 CELLS/UL (ref 0–200)
BASOPHILS NFR BLD AUTO: 1.1 %
BILIRUB SERPL-MCNC: 0.6 MG/DL (ref 0.2–1.2)
BUN SERPL-MCNC: 15 MG/DL (ref 7–25)
BUN/CREAT SERPL: NORMAL (CALC) (ref 6–22)
CALCIUM SERPL-MCNC: 9.6 MG/DL (ref 8.6–10.4)
CHLORIDE SERPL-SCNC: 103 MMOL/L (ref 98–110)
CHOLEST SERPL-MCNC: 195 MG/DL
CHOLEST/HDLC SERPL: 2.6 (CALC)
CO2 SERPL-SCNC: 31 MMOL/L (ref 20–32)
CREAT SERPL-MCNC: 0.54 MG/DL (ref 0.5–1.05)
EOSINOPHIL # BLD AUTO: 178 CELLS/UL (ref 15–500)
EOSINOPHIL NFR BLD AUTO: 6.6 %
ERYTHROCYTE [DISTWIDTH] IN BLOOD BY AUTOMATED COUNT: 12.4 % (ref 11–15)
GLOBULIN SER CALC-MCNC: 2.4 G/DL (CALC) (ref 1.9–3.7)
GLUCOSE SERPL-MCNC: 77 MG/DL (ref 65–99)
HCT VFR BLD AUTO: 43.1 % (ref 35–45)
HDLC SERPL-MCNC: 75 MG/DL
HGB BLD-MCNC: 13.7 G/DL (ref 11.7–15.5)
LDLC SERPL CALC-MCNC: 103 MG/DL (CALC)
LYMPHOCYTES # BLD AUTO: 994 CELLS/UL (ref 850–3900)
LYMPHOCYTES NFR BLD AUTO: 36.8 %
MCH RBC QN AUTO: 30 PG (ref 27–33)
MCHC RBC AUTO-ENTMCNC: 31.8 G/DL (ref 32–36)
MCV RBC AUTO: 94.3 FL (ref 80–100)
MONOCYTES # BLD AUTO: 308 CELLS/UL (ref 200–950)
MONOCYTES NFR BLD AUTO: 11.4 %
NEUTROPHILS # BLD AUTO: 1191 CELLS/UL (ref 1500–7800)
NEUTROPHILS NFR BLD AUTO: 44.1 %
NONHDLC SERPL-MCNC: 120 MG/DL (CALC)
PLATELET # BLD AUTO: 248 THOUSAND/UL (ref 140–400)
PMV BLD REES-ECKER: 10.5 FL (ref 7.5–12.5)
POTASSIUM SERPL-SCNC: 4.6 MMOL/L (ref 3.5–5.3)
PROT SERPL-MCNC: 6.9 G/DL (ref 6.1–8.1)
QUEST EGFR AFRICAN AMERICAN: 125 ML/MIN/1.73M2
QUEST EGFR NON-AFR. AMERICAN: 108 ML/MIN/1.73M2
RBC # BLD AUTO: 4.57 MILLION/UL (ref 3.8–5.1)
SODIUM SERPL-SCNC: 141 MMOL/L (ref 135–146)
T4 FREE SERPL-MCNC: 1.5 NG/DL (ref 0.8–1.8)
TRIGL SERPL-MCNC: 84 MG/DL
TSH SERPL-ACNC: 0.91 MIU/L
WBC # BLD AUTO: 2.7 THOUSAND/UL (ref 3.8–10.8)

## 2021-11-18 ENCOUNTER — HOSPITAL ENCOUNTER (OUTPATIENT)
Dept: RADIOLOGY | Age: 51
Discharge: HOME | End: 2021-11-18
Attending: OBSTETRICS & GYNECOLOGY
Payer: COMMERCIAL

## 2021-11-18 ENCOUNTER — TRANSCRIBE ORDERS (OUTPATIENT)
Dept: REGISTRATION | Age: 51
End: 2021-11-18
Payer: COMMERCIAL

## 2021-11-18 DIAGNOSIS — Z12.31 ENCOUNTER FOR SCREENING MAMMOGRAM FOR MALIGNANT NEOPLASM OF BREAST: ICD-10-CM

## 2021-11-18 DIAGNOSIS — Z12.31 ENCOUNTER FOR SCREENING MAMMOGRAM FOR MALIGNANT NEOPLASM OF BREAST: Primary | ICD-10-CM

## 2021-11-18 PROCEDURE — 77067 SCR MAMMO BI INCL CAD: CPT

## 2021-11-29 RX ORDER — LEVOTHYROXINE SODIUM 100 UG/1
TABLET ORAL
Qty: 90 TABLET | Refills: 0 | Status: SHIPPED | OUTPATIENT
Start: 2021-11-29 | End: 2022-03-31 | Stop reason: SDUPTHER

## 2022-01-18 NOTE — PROGRESS NOTES
PT DAILY NOTE FOR OUTPATIENT THERAPY    Patient: Sharon Morales   MRN: [id number]  : 1970 51 y.o.  Referring Physician: Neetu Jang CRNP  Date of Visit: 2021    Certification Dates: 21 through 21    Diagnosis:   1. Left sided sciatica        Chief Complaints:       Precautions:   Existing Precautions/Restrictions: no known precautions/restrictions     TODAY'S VISIT    History/Vitals/Pain/Encounter Info - 21 1110        Injury History/Precautions/Daily Required Info    Onset of Illness/Injury or Date of Surgery  21     Referring Physician  Neetu Jang     Existing Precautions/Restrictions  no known precautions/restrictions     Pertinent History of Current Functional Problem  Pt. complaints of L sided LBP and radiculopathy onset 2021.  Pt. reports symptoms are insidious in nature, and are worsening since onset.  Pt. had a telehealth appt 1.5 months ago and was prescribed a steroid taper pack, which initially provided relief, however symptoms returned after medication use was ended.  Pt. reports difficulty getting out of bed, intermittent tingling and constant pain in the L side of the low back and L posterior thigh to the knee.  Pt. now uses 600 mg ibuprofen PRN for pain.  Pt. reports symptoms are better with movement, worse first thing in the morning.  Pt. swims and walks for exercise.  Pt. works from home.  Pt. 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     OP Specialty  Orthopedics     Document Type  daily treatment     Patient/Family/Caregiver Comments/Observations  Pt. reports continued pain in LB and L LE but not as bad as alast week.     Start Time  1100     Stop Time  1157     Time Calculation (min)  57 min     Patient reported fall since last visit  No        Pain Assessment    Currently in pain  Yes     Preferred Pain Scale  number  Numeric Rating Pain Scale)     Pain: Body location  Back;Leg     Pain Rating (0-10): Pre Activity  5     Pain Rating (0-10): Post Activity  5        Pain Intervention    Intervention   HP, TE, LAD     Post Intervention Comments  LAD feels good but no change in SPL         Daily Treatment Assessment and Plan - 04/13/21 1331        Daily Treatment Assessment and Plan    Progress toward goals  Slower than expected     Daily Outcome Summary  Continued with HP x 10 min to LB in prone over pillows position. Review of back and LE stretches and core stab exs, LAD B LE 5 x 30 sec with 30 sec rest. Pt. reports that the LAD feels really good but does not have overall change in symptoms. HEP updated.     Plan and Recommendations  Continue to advance toward goals as able without increasing pain.               Today's Treatment:    ORTHO PT FLOWSHEET    Exercises Current Session Time   MODALITIES- HEAT/ICE TOTAL TIME FOR SESSION 8-22 Minutes   Heat To LB prone over 2 pillows   Ice/Vasocompression    MODALITIES - E-STIM TOTAL TIME FOR SESSION Not performed   E-stim/H-Wave    MODALITIES - US TOTAL TIME FOR SESSION Not performed   US    Iontophoresis    MODALITIES - TRACTION TOTAL TIME FOR SESSION Not performed   Mechanical Traction    THER ACT TOTAL TIME FOR SESSION Not performed   Bed mobility    Transfer training    Body Mechanics/Work Training    Pt. education Pt. Education on current condition, POC, and prognosis.  Pt. Provided with initial HEP stretches (see media)   THER EX TOTAL TIME FOR SESSION 38-52 Minutes   CARDIOVASCULAR     Nu Step    Stationary Bike    Elliptical    Treadmill    UBE    STRENGTHENING    Supine ther-ex  PPT  Bridge  Clamshells  Hip extension (prone, standing)  Squatting  LTR with p-ball  Bridge with feet on p-ball       With G TB around knees (bridge with hip abduction) 2 x10 5s hold          10x10s  5s 2x10   Seated ther-ex    Standing ther-ex    STRETCHING    LE stretching:  Hip flexor with  strap  Hamstring stretch  Piriformis stretch 1/2 pigeon  ppu  natacha  Open book stretch       4x20-30s    3 min  5 x 10 s each   Stand GSS    Stand ITB    q-ped cat/cow 10x10s   Thoracic stretch w/ p-ball standing 10x10s   Other stretching Tennis ball release L QL and L/s paraspinal in sidelying and supine-NO   REPEATED MOVEMENTS    NEURO RE-ED TOTAL TIME FOR SESSION Not performed   COORDINATION    POSTURAL RE-ED       PRE-GAIT ACTIVITIES     BALANCE TRAINING     Sitting balance    Standing balance    MANUAL TOTAL TIME FOR SESSION 5 min (billed with TE)   Stretching LAD B 5 x 30 sec   Mobilization     Massage:  Lumbosacral decompression, Piriformis release, Glute release    Taping

## 2022-01-21 ENCOUNTER — TELEPHONE (OUTPATIENT)
Dept: PRIMARY CARE | Facility: CLINIC | Age: 52
End: 2022-01-21
Payer: COMMERCIAL

## 2022-01-21 NOTE — TELEPHONE ENCOUNTER
Patient would like to know when the last time she's gotten the hep B shot for her employer. Patient would also like to know if she is due for another shot if its a series

## 2022-01-21 NOTE — TELEPHONE ENCOUNTER
Spoke with Sharon, letting her know that she did have the Hep B vaccine. Provided her with dates 2/1/95, 3/1/95, and 9/1/95

## 2022-03-31 RX ORDER — LEVOTHYROXINE SODIUM 100 UG/1
100 TABLET ORAL
Qty: 90 TABLET | Refills: 1 | Status: SHIPPED | OUTPATIENT
Start: 2022-03-31 | End: 2022-09-16

## 2022-03-31 NOTE — TELEPHONE ENCOUNTER
Do you have enough medication for the next 5 days?: 3 days     Did you request this medication through your pharmacy or our patient portal in the last day or two?  No     Name of medication requested: levothyroxine 100 mcg tablet  Medication Strength: 100mcg  Mediation Directions:   Quantity Requested (example 30/90): 90  Number of refills requested:       [id number] - [place] - [address]  [facility] [address]  Phone: [phone] Fax: [phone]    Additional Comments:    Next Encounter with this provider: 9/6/2022

## 2022-06-15 NOTE — PROGRESS NOTES
Sharon Morales is a 48 y.o. female,   :  1970  REFERRING PHYSICIAN:   Dr. Wilson  PRIMARY CARE PROVIDER:  Natalia Wilson, DO    Encounter Diagnoses   Name Primary?   • Leukopenia, unspecified type Yes   • Vitamin B12 deficiency      Patient Active Problem List   Diagnosis   • Hypothyroidism   • Knee stiffness, left   • At risk for venous thromboembolism (VTE)   • Pain management   • Constipation   • Leukopenia   • Acute blood loss anemia   • Vitamin B12 deficiency       History of Present Illness  Sharon Morales is seen today at the request of Ntaalia Wilson, DO for   Encounter Diagnoses   Name Primary?   • Leukopenia, unspecified type Yes   • Vitamin B12 deficiency    .   Records were reviewed.  She presents today because of leukopenia.  She says her dad has leukopenia without any adverse events associated with it.  She says no one really knows why he has it.  She says she has had a low white count for a while she is not really sure why.  She recently had bilateral knee replacement.  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  She also was told as a child that she has rheumatoid arthritis.  She says all the paperwork her mother ever feels out on her says rheumatoid arthritis so she assumes she has rheumatoid arthritis.  Her knees have always hurt her.  She does not have any pain in her hands or in her back.  She denies any redness or swelling of her joints.  She also notes that she was told she was hypothyroid and now takes Synthroid.  She is not really sure why that is the case.  Otherwise she thinks she is pretty healthy.  She has not have any history of recurrent infections.  Review of her laboratory studies over the past 2 years reveal normal lecture lites and liver function tests.  Her hemoglobin and platelet count have been normal.  Her white count in 2016 was normal but she says that was the time where she was undergoing a knee surgery.  Her counts then dropped and were in the 2.6-4.4  area.  In December her white count was normal at 6.8 but she was having surgery.  Appears that whenever she undergoes procedure her count is normal.  Her neutrophil count from 2000 17/18 was adequate.        Review of Systems   Constitutional: Negative for appetite change, chills, diaphoresis, fatigue, fever and unexpected weight change.   HENT:  Negative.    Eyes: Negative.    Respiratory: Negative.    Cardiovascular: Negative.    Endocrine: Negative.    Genitourinary: Negative.     Musculoskeletal: Positive for arthralgias and gait problem.   Skin: Negative.    Neurological: Positive for gait problem.        Due to recent knee surgery.   Hematological: Negative.    Psychiatric/Behavioral: Negative.    All other systems reviewed and are negative.    Review of Systems:  Nursing assessment reviewed. Pertinent positive and negative symptoms noted in HPI, all others negative.    Temp:  [36.5 °C (97.7 °F)] 36.5 °C (97.7 °F)  Heart Rate:  [103] 103  Resp:  [18] 18  BP: (127)/(54) 127/54  BP (!) 127/54   Pulse (!) 103   Temp 36.5 °C (97.7 °F)   Resp 18   Wt 54.3 kg (119 lb 12.8 oz)   BMI 25.04 kg/m²   Physical Exam   Constitutional: She is oriented to person, place, and time. She appears well-developed and well-nourished.   HENT:   Head: Normocephalic and atraumatic.   Nose: Nose normal.   Eyes: EOM are normal.   Neck: Neck supple.   Cardiovascular: Normal rate, regular rhythm, normal heart sounds and intact distal pulses.    Pulmonary/Chest: Effort normal and breath sounds normal. No respiratory distress. She has no wheezes. She has no rales. She exhibits no tenderness.   Abdominal: Soft. Bowel sounds are normal. She exhibits no distension and no mass. There is no tenderness.   Musculoskeletal: She exhibits no edema.   Lymphadenopathy:     She has no cervical adenopathy.   Neurological: She is alert and oriented to person, place, and time.   Skin: Skin is warm and dry.   Psychiatric: She has a normal mood and  affect.   Nursing note and vitals reviewed.      Past Medical History:   Diagnosis Date   • Arthritis     Knees -sp B/L TKR   • At risk for venous thromboembolism (VTE) 2018   • Constipation 2018   • Hypothyroidism    • Kidney stones    • Last menstrual period (LMP) > 10 days ago 10/10/2018   • Leukopenia 2018   • Motion sickness    • Pain management 2018   • PONV (postoperative nausea and vomiting)    • Seasonal allergies    • Snores        Past Surgical History:   Procedure Laterality Date   • BUNIONECTOMY Bilateral    •  SECTION      x 3   • JOINT REPLACEMENT Bilateral    • REVISION TOTAL KNEE ARTHROPLASTY Right 2018       OB History    Para Term  AB Living   5 4           SAB TAB Ectopic Multiple Live Births                  # Outcome Date GA Lbr Chucky/2nd Weight Sex Delivery Anes PTL Lv   5             4 Para            3 Para            2 Para            1 Para                   Social History   Substance Use Topics   • Smoking status: Never Smoker   • Smokeless tobacco: Never Used   • Alcohol use 0.6 oz/week     1 Glasses of wine per week      Comment: occas       Family History   Problem Relation Age of Onset   • Hypertension Mother    • COPD Mother    • Lymphoma Mother    • Skin cancer Father    • Stomach cancer Other          Allergies  Avocado    Medications  Current Outpatient Prescriptions   Medication Sig Dispense Refill   • levothyroxine (SYNTHROID) 112 mcg tablet TAKE 1 TABLET BY MOUTH EVERY DAY 90 tablet 3     No current facility-administered medications for this visit.           Laboratory    Recent Results (from the past 672 hour(s))   CBC and differential    Collection Time: 19  9:08 AM   Result Value Ref Range    White Blood Cell Count 2.6 (L) 3.8 - 10.8 Thousand/uL    Red Blood Cell Count 4.46 3.80 - 5.10 Million/uL    Hemoglobin 12.9 11.7 - 15.5 g/dL    HEMATOCRIT 40.4 35.0 - 45.0 %    Mcv 90.6 80.0 - 100.0 fL    Mch 28.9 27.0 - 33.0 pg     Mchc 31.9 (L) 32.0 - 36.0 g/dL    Rdw 15.4 (H) 11.0 - 15.0 %    Platelet Count 226 140 - 400 Thousand/uL    Mpv 10.5 7.5 - 12.5 fL    Absolute Neutrophils 991 (L) 1,500 - 7,800 cells/uL    Absolute Lymphocytes 946 850 - 3,900 cells/uL    Absolute Monocytes 403 200 - 950 cells/uL    Absolute Eosinophils 211 15 - 500 cells/uL    Absolute Basophils 49 0 - 200 cells/uL    Neutrophils 38.1 %    Lymphocytes 36.4 %    Monocytes 15.5 %    Eosinophils 8.1 %    Basophils 1.9 %   AMB EXTERNAL COLONOSCOPY RESULT    Collection Time: 01/25/19 12:00 AM   Result Value Ref Range    HM Colonoscopy 3 yrs                .    Assessment and Plan  Leukopenia  She has a history of leukopenia without any recurrent infections.  She notes that her father has a low white count too and thus it may be familial.  However she also reports that she has been told that she has rheumatoid arthritis all of her life.  Thus there is a possibility of this being autoimmune disorder.  In an autoimmune phenomena, the count typically normalizes during times of stress or infection.  Her count has normalized during her surgical procedures.  At this time there is not a clear-cut etiology of her leukopenia, however she is asymptomatic from it and there is not been the development of any symptoms over the past year or 2 since she is had it.  I am going to perform a laboratory evaluation including a CBC,B12, folate, SPEP, serum light chains, rheumatoid factor, and YUMIKO.  I will contact her by phone.  If these counts are normal we can continue to follow her counts and monitor them.  If they worsen or she develops symptomatology then we would pursue further evaluation.  The alternative would be to perform a bone marrow aspirate and biopsy to confirm that there is not a primary bone marrow disorder.  At this time I would favor following her with her surveillance.  This was discussed with her.  She was given a prescription to get her blood work.  I will contact her  by phone.      Lianna Raphael MD              Detail Level: Zone

## 2022-09-06 ENCOUNTER — OFFICE VISIT (OUTPATIENT)
Dept: PRIMARY CARE | Facility: CLINIC | Age: 52
End: 2022-09-06
Payer: COMMERCIAL

## 2022-09-06 VITALS
HEIGHT: 58 IN | HEART RATE: 85 BPM | DIASTOLIC BLOOD PRESSURE: 70 MMHG | OXYGEN SATURATION: 98 % | BODY MASS INDEX: 26.24 KG/M2 | TEMPERATURE: 98 F | RESPIRATION RATE: 16 BRPM | SYSTOLIC BLOOD PRESSURE: 110 MMHG | WEIGHT: 125 LBS

## 2022-09-06 DIAGNOSIS — E03.9 HYPOTHYROIDISM, UNSPECIFIED TYPE: ICD-10-CM

## 2022-09-06 DIAGNOSIS — Z00.00 ROUTINE PHYSICAL EXAMINATION: Primary | ICD-10-CM

## 2022-09-06 DIAGNOSIS — D72.819 LEUKOPENIA, UNSPECIFIED TYPE: ICD-10-CM

## 2022-09-06 PROCEDURE — 99396 PREV VISIT EST AGE 40-64: CPT | Performed by: FAMILY MEDICINE

## 2022-09-06 PROCEDURE — 3008F BODY MASS INDEX DOCD: CPT | Performed by: FAMILY MEDICINE

## 2022-09-06 RX ORDER — VALACYCLOVIR HYDROCHLORIDE 1 G/1
TABLET, FILM COATED ORAL AS NEEDED
COMMUNITY
Start: 2022-08-01

## 2022-09-06 ASSESSMENT — ENCOUNTER SYMPTOMS
COUGH: 0
SORE THROAT: 0
DIZZINESS: 0
CONSTIPATION: 0
CHILLS: 0
SLEEP DISTURBANCE: 0
WHEEZING: 0
ABDOMINAL PAIN: 0
BLOOD IN STOOL: 0
FREQUENCY: 0
DYSURIA: 0
NERVOUS/ANXIOUS: 0
FATIGUE: 0
PALPITATIONS: 0
HEADACHES: 0
FEVER: 0
MYALGIAS: 0
SHORTNESS OF BREATH: 0
ARTHRALGIAS: 0
HEMATURIA: 0
RHINORRHEA: 0

## 2022-09-06 ASSESSMENT — PATIENT HEALTH QUESTIONNAIRE - PHQ9: SUM OF ALL RESPONSES TO PHQ9 QUESTIONS 1 & 2: 0

## 2022-09-06 NOTE — PROGRESS NOTES
Subjective      Patient ID: Sharon Morales is a 52 y.o. female.    She is here for physical. Pt has been doing well overall. She is trying to follow well balanced meals. Stays well hydrated. UTD with gyn care. She is doing cardio swimming 3x wk for 1hr and walking/weights/pilates occasionally. Average 6-7 hrs of sleep a night. Normal BMs and urination. Postmenopausal. UTD with gyn care. UTD with mammo. UTD with eye and dental exam. UTD with immunizations. UTD with colonoscopy in 2021 and due in 5yrs. Due for fasting labs. She was getting eye infections during the COVID and was given polytrim and was ok but then has been getting the eye infections again with R eye with white part of eye redness and irritation and doesn't correlate with the chlorine.       The following have been reviewed and updated as appropriate in this visit:   Tobacco  Allergies  Meds  Problems  Med Hx  Surg Hx  Fam Hx         Review of Systems   Constitutional: Negative for chills, fatigue and fever.   HENT: Negative for ear discharge, ear pain, postnasal drip, rhinorrhea and sore throat.         B/L eye redness/irritation R>L   Respiratory: Negative for cough, shortness of breath and wheezing.    Cardiovascular: Negative for chest pain and palpitations.   Gastrointestinal: Negative for abdominal pain, blood in stool and constipation.   Genitourinary: Negative for dysuria, frequency and hematuria.   Musculoskeletal: Negative for arthralgias and myalgias.   Skin: Negative for rash.   Neurological: Negative for dizziness and headaches.   Psychiatric/Behavioral: Negative for sleep disturbance and suicidal ideas. The patient is not nervous/anxious.        Current Outpatient Medications   Medication Sig Dispense Refill    estradioL (ESTRACE) 0.01 % (0.1 mg/gram) vaginal cream USE VAGINALLY AS DIRECTED TWICE DAILY FOR 2 WEEKS, THEN TWICE WEEKLY THEREAFTER      Lactobac no.41/Bifidobact no.7 (PROBIOTIC-10 ORAL) Take by mouth.      levothyroxine  (SYNTHROID) 100 mcg tablet Take 1 tablet (100 mcg total) by mouth once daily. 90 tablet 1    valACYclovir (VALTREX) 1 gram tablet TAKE 2 TABLETS NOW, THEN REPEAT IN 12 HOURS      XIIDRA 5 % dropperette dropperette        No current facility-administered medications for this visit.     Past Medical History:   Diagnosis Date    Arthritis     Knees -sp B/L TKR    At risk for venous thromboembolism (VTE) 2018    COVID-19 2022    Hypothyroidism     Kidney stones     Leukopenia 2018    Motion sickness     PONV (postoperative nausea and vomiting)     Seasonal allergies     Snores      Family History   Problem Relation Age of Onset    Hypertension Biological Mother     COPD Biological Mother     Melanoma Biological Mother     Skin cancer Biological Father     Stomach cancer Other      Past Surgical History:   Procedure Laterality Date    BUNIONECTOMY Bilateral      SECTION      x 3    COLONOSCOPY  2021    every 5yrs    JOINT REPLACEMENT Bilateral     NASAL SINUS SURGERY  2021    REVISION TOTAL KNEE ARTHROPLASTY Right 2018     Social History     Socioeconomic History    Marital status:      Spouse name: Not on file    Number of children: 4    Years of education: Not on file    Highest education level: Not on file   Occupational History    Occupation: Social Workers   Tobacco Use    Smoking status: Never Smoker    Smokeless tobacco: Never Used   Vaping Use    Vaping Use: Never used   Substance and Sexual Activity    Alcohol use: Yes     Alcohol/week: 1.0 standard drink     Types: 1 Glasses of wine per week     Comment: occas    Drug use: No    Sexual activity: Yes     Partners: Male   Other Topics Concern    Not on file   Social History Narrative    Not on file     Social Determinants of Health     Financial Resource Strain: Not on file   Food Insecurity: Not on file   Transportation Needs: Not on file   Physical Activity: Not on file   Stress: Not on  "file   Social Connections: Not on file   Intimate Partner Violence: Not on file   Housing Stability: Not on file     Allergies   Allergen Reactions    Avocado      Other reaction(s): vomiting       Objective   Visit Vitals  /70 (BP Location: Left upper arm, Patient Position: Sitting)   Pulse 85   Temp 36.7 °C (98 °F) (Temporal)   Resp 16   Ht 1.473 m (4' 10\")   Wt 56.7 kg (125 lb) Comment: without shoes   LMP 12/03/2019   SpO2 98%   BMI 26.13 kg/m²     Physical Exam  Vitals and nursing note reviewed.   Constitutional:       Appearance: Normal appearance.   HENT:      Right Ear: Tympanic membrane normal. There is no impacted cerumen.      Left Ear: Tympanic membrane normal. There is no impacted cerumen.      Nose: Nose normal. No rhinorrhea.      Mouth/Throat:      Mouth: Mucous membranes are moist.      Pharynx: Oropharynx is clear. No posterior oropharyngeal erythema.   Eyes:      Comments: B/L mild red eyes   Cardiovascular:      Rate and Rhythm: Normal rate and regular rhythm.      Heart sounds: No murmur heard.  Pulmonary:      Effort: Pulmonary effort is normal.      Breath sounds: Normal breath sounds. No wheezing.   Abdominal:      General: Bowel sounds are normal.      Palpations: Abdomen is soft.      Tenderness: There is no abdominal tenderness.   Musculoskeletal:         General: Normal range of motion.      Cervical back: Normal range of motion and neck supple.   Skin:     General: Skin is warm.      Findings: No rash.   Neurological:      General: No focal deficit present.      Mental Status: She is alert and oriented to person, place, and time.      Cranial Nerves: No cranial nerve deficit.   Psychiatric:         Mood and Affect: Mood normal.         Behavior: Behavior normal.         Assessment/Plan   Problem List Items Addressed This Visit        Endocrine/Metabolic    Hypothyroidism    Relevant Orders    TSH    T4, free       Hematologic    Leukopenia      Other Visit Diagnoses     Routine " physical examination    -  Primary    Relevant Orders    Comprehensive metabolic panel    Lipid panel    CBC and differential      She is doing pretty good overall. We discussed diet and exercise. Will check updated fasting labs. UTD with eye and dental exam but asked her to go back to eye dr for recurrent red eyes and not responding to pataday and she will keep me updated! UTD with gyn care/mammo. UTD with colonoscopy. UTD with immunizations.     Natalia Wilson, DO  9/6/2022

## 2022-09-16 RX ORDER — LEVOTHYROXINE SODIUM 100 UG/1
TABLET ORAL
Qty: 90 TABLET | Refills: 1 | Status: SHIPPED | OUTPATIENT
Start: 2022-09-16 | End: 2023-03-06

## 2022-09-22 LAB
ALBUMIN SERPL-MCNC: 4.4 G/DL (ref 3.6–5.1)
ALBUMIN/GLOB SERPL: 1.7 (CALC) (ref 1–2.5)
ALP SERPL-CCNC: 88 U/L (ref 37–153)
ALT SERPL-CCNC: 11 U/L (ref 6–29)
AST SERPL-CCNC: 18 U/L (ref 10–35)
BASOPHILS # BLD AUTO: 31 CELLS/UL (ref 0–200)
BASOPHILS NFR BLD AUTO: 1 %
BILIRUB SERPL-MCNC: 0.4 MG/DL (ref 0.2–1.2)
BUN SERPL-MCNC: 16 MG/DL (ref 7–25)
BUN/CREAT SERPL: NORMAL (CALC) (ref 6–22)
CALCIUM SERPL-MCNC: 9.4 MG/DL (ref 8.6–10.4)
CHLORIDE SERPL-SCNC: 105 MMOL/L (ref 98–110)
CHOLEST SERPL-MCNC: 181 MG/DL
CHOLEST/HDLC SERPL: 2.6 (CALC)
CO2 SERPL-SCNC: 32 MMOL/L (ref 20–32)
CREAT SERPL-MCNC: 0.57 MG/DL (ref 0.5–1.03)
EGFRCR SERPLBLD CKD-EPI 2021: 108 ML/MIN/1.73M2
EOSINOPHIL # BLD AUTO: 140 CELLS/UL (ref 15–500)
EOSINOPHIL NFR BLD AUTO: 4.5 %
ERYTHROCYTE [DISTWIDTH] IN BLOOD BY AUTOMATED COUNT: 12.8 % (ref 11–15)
GLOBULIN SER CALC-MCNC: 2.6 G/DL (CALC) (ref 1.9–3.7)
GLUCOSE SERPL-MCNC: 83 MG/DL (ref 65–99)
HCT VFR BLD AUTO: 41.9 % (ref 35–45)
HDLC SERPL-MCNC: 69 MG/DL
HGB BLD-MCNC: 13.8 G/DL (ref 11.7–15.5)
LDLC SERPL CALC-MCNC: 96 MG/DL (CALC)
LYMPHOCYTES # BLD AUTO: 1200 CELLS/UL (ref 850–3900)
LYMPHOCYTES NFR BLD AUTO: 38.7 %
MCH RBC QN AUTO: 30.7 PG (ref 27–33)
MCHC RBC AUTO-ENTMCNC: 32.9 G/DL (ref 32–36)
MCV RBC AUTO: 93.1 FL (ref 80–100)
MONOCYTES # BLD AUTO: 388 CELLS/UL (ref 200–950)
MONOCYTES NFR BLD AUTO: 12.5 %
NEUTROPHILS # BLD AUTO: 1342 CELLS/UL (ref 1500–7800)
NEUTROPHILS NFR BLD AUTO: 43.3 %
NONHDLC SERPL-MCNC: 112 MG/DL (CALC)
PLATELET # BLD AUTO: 272 THOUSAND/UL (ref 140–400)
PMV BLD REES-ECKER: 10.3 FL (ref 7.5–12.5)
POTASSIUM SERPL-SCNC: 4.1 MMOL/L (ref 3.5–5.3)
PROT SERPL-MCNC: 7 G/DL (ref 6.1–8.1)
RBC # BLD AUTO: 4.5 MILLION/UL (ref 3.8–5.1)
SODIUM SERPL-SCNC: 141 MMOL/L (ref 135–146)
T4 FREE SERPL-MCNC: 1.4 NG/DL (ref 0.8–1.8)
TRIGL SERPL-MCNC: 74 MG/DL
TSH SERPL-ACNC: 0.69 MIU/L
WBC # BLD AUTO: 3.1 THOUSAND/UL (ref 3.8–10.8)

## 2022-11-15 ENCOUNTER — TRANSCRIBE ORDERS (OUTPATIENT)
Dept: SCHEDULING | Age: 52
End: 2022-11-15

## 2022-11-15 DIAGNOSIS — Z12.31 ENCOUNTER FOR SCREENING MAMMOGRAM FOR MALIGNANT NEOPLASM OF BREAST: Primary | ICD-10-CM

## 2022-12-22 ENCOUNTER — HOSPITAL ENCOUNTER (OUTPATIENT)
Dept: RADIOLOGY | Age: 52
Discharge: HOME | End: 2022-12-22
Attending: OBSTETRICS & GYNECOLOGY
Payer: COMMERCIAL

## 2022-12-22 DIAGNOSIS — Z12.31 ENCOUNTER FOR SCREENING MAMMOGRAM FOR MALIGNANT NEOPLASM OF BREAST: ICD-10-CM

## 2022-12-22 PROCEDURE — 77063 BREAST TOMOSYNTHESIS BI: CPT

## 2023-01-10 ENCOUNTER — HOSPITAL ENCOUNTER (OUTPATIENT)
Dept: RADIOLOGY | Age: 53
Discharge: HOME | End: 2023-01-10
Attending: RADIOLOGY
Payer: COMMERCIAL

## 2023-01-10 ENCOUNTER — PATIENT OUTREACH (OUTPATIENT)
Dept: RADIOLOGY | Facility: HOSPITAL | Age: 53
End: 2023-01-10
Payer: COMMERCIAL

## 2023-01-10 DIAGNOSIS — R92.8 ABNORMAL MAMMOGRAM: ICD-10-CM

## 2023-01-10 PROCEDURE — 76642 ULTRASOUND BREAST LIMITED: CPT | Mod: LT

## 2023-01-10 PROCEDURE — 77065 DX MAMMO INCL CAD UNI: CPT | Mod: LT

## 2023-01-11 ENCOUNTER — TRANSCRIBE ORDERS (OUTPATIENT)
Dept: RADIOLOGY | Facility: HOSPITAL | Age: 53
End: 2023-01-11

## 2023-01-11 DIAGNOSIS — R92.8 OTHER ABNORMAL AND INCONCLUSIVE FINDINGS ON DIAGNOSTIC IMAGING OF BREAST: Primary | ICD-10-CM

## 2023-01-25 ENCOUNTER — OFFICE VISIT (OUTPATIENT)
Dept: SURGERY | Facility: CLINIC | Age: 53
End: 2023-01-25
Payer: COMMERCIAL

## 2023-01-25 ENCOUNTER — HOSPITAL ENCOUNTER (OUTPATIENT)
Dept: RADIOLOGY | Facility: HOSPITAL | Age: 53
Discharge: HOME | End: 2023-01-25
Attending: SURGERY
Payer: COMMERCIAL

## 2023-01-25 VITALS
BODY MASS INDEX: 24.81 KG/M2 | WEIGHT: 118.2 LBS | HEIGHT: 58 IN | DIASTOLIC BLOOD PRESSURE: 99 MMHG | HEART RATE: 104 BPM | OXYGEN SATURATION: 99 % | TEMPERATURE: 98.1 F | SYSTOLIC BLOOD PRESSURE: 104 MMHG

## 2023-01-25 DIAGNOSIS — R92.8 ABNORMAL FINDINGS ON DIAGNOSTIC IMAGING OF BREAST: ICD-10-CM

## 2023-01-25 DIAGNOSIS — R92.8 ABNORMAL FINDINGS ON DIAGNOSTIC IMAGING OF BREAST: Primary | ICD-10-CM

## 2023-01-25 PROCEDURE — 25000000 HC PHARMACY GENERAL: Performed by: SURGERY

## 2023-01-25 PROCEDURE — 88305 TISSUE EXAM BY PATHOLOGIST: CPT | Performed by: SURGERY

## 2023-01-25 PROCEDURE — 3008F BODY MASS INDEX DOCD: CPT | Performed by: SURGERY

## 2023-01-25 PROCEDURE — BH41ZZZ ULTRASONOGRAPHY OF LEFT BREAST: ICD-10-PCS | Performed by: RADIOLOGY

## 2023-01-25 PROCEDURE — 77065 DX MAMMO INCL CAD UNI: CPT | Mod: LT

## 2023-01-25 PROCEDURE — 99204 OFFICE O/P NEW MOD 45 MIN: CPT | Performed by: SURGERY

## 2023-01-25 PROCEDURE — 27200000 US GUIDED BREAST BIOPSY LEFT

## 2023-01-25 PROCEDURE — 0HBU3ZX EXCISION OF LEFT BREAST, PERCUTANEOUS APPROACH, DIAGNOSTIC: ICD-10-PCS | Performed by: RADIOLOGY

## 2023-01-25 RX ORDER — LIDOCAINE HYDROCHLORIDE 10 MG/ML
3 INJECTION, SOLUTION EPIDURAL; INFILTRATION; INTRACAUDAL; PERINEURAL ONCE
Status: COMPLETED | OUTPATIENT
Start: 2023-01-25 | End: 2023-01-25

## 2023-01-25 RX ORDER — LIDOCAINE HYDROCHLORIDE AND EPINEPHRINE 10; 10 UG/ML; MG/ML
20 INJECTION, SOLUTION INFILTRATION; PERINEURAL ONCE
Status: COMPLETED | OUTPATIENT
Start: 2023-01-25 | End: 2023-01-25

## 2023-01-25 RX ADMIN — LIDOCAINE HYDROCHLORIDE 1 ML: 10 INJECTION, SOLUTION EPIDURAL; INFILTRATION; INTRACAUDAL; PERINEURAL at 12:56

## 2023-01-25 RX ADMIN — LIDOCAINE HYDROCHLORIDE AND EPINEPHRINE 2 ML: 10; 10 INJECTION, SOLUTION INFILTRATION; PERINEURAL at 12:56

## 2023-01-25 ASSESSMENT — ENCOUNTER SYMPTOMS
POLYPHAGIA: 0
BRUISES/BLEEDS EASILY: 0
PALPITATIONS: 0
CONSTIPATION: 0
TREMORS: 0
CHILLS: 0
WHEEZING: 0
LIGHT-HEADEDNESS: 0
DIZZINESS: 0
COUGH: 0
AGITATION: 0
HALLUCINATIONS: 0
SPEECH DIFFICULTY: 0
FATIGUE: 0
DIAPHORESIS: 0
NAUSEA: 0
FEVER: 0
DECREASED CONCENTRATION: 0
HEMATURIA: 0
CHOKING: 0
CONFUSION: 0
DYSPHORIC MOOD: 0
WEAKNESS: 0
JOINT SWELLING: 0
ADENOPATHY: 0
SLEEP DISTURBANCE: 0
BACK PAIN: 0
ABDOMINAL DISTENTION: 0
SHORTNESS OF BREATH: 0
NECK PAIN: 0
DIFFICULTY URINATING: 0
FACIAL ASYMMETRY: 0
ACTIVITY CHANGE: 0
FLANK PAIN: 0
RECTAL PAIN: 0
DYSURIA: 0
SEIZURES: 0
HEADACHES: 0
ANAL BLEEDING: 0
NECK STIFFNESS: 0
NERVOUS/ANXIOUS: 0
ARTHRALGIAS: 0
APPETITE CHANGE: 0
CHEST TIGHTNESS: 0
HYPERACTIVE: 0
ABDOMINAL PAIN: 0
NUMBNESS: 0
VOMITING: 0
DIARRHEA: 0
STRIDOR: 0
APNEA: 0
FREQUENCY: 0
POLYDIPSIA: 0
MYALGIAS: 0
BLOOD IN STOOL: 0
UNEXPECTED WEIGHT CHANGE: 0

## 2023-01-25 NOTE — PROGRESS NOTES
[facility]  Dr. Tania Arteaga  [address] [address]  Phone: [phone]  Fax: [phone]      Patient ID: Sharon Morales                              : 1970    Visit Date: 2023  Referring Provider: Karla Fernandez MD   PCP: Natalia Wilson DO  GYN: No care team member to display    Chief Complaint: Abnormal Breast Imaging      Sharon Morales is a 52 y.o.  female who presents today for evaluation after abnormal screening imaging. She denies nipple discharge, nipple inversion, skin dimpling.    She has a family history of paternal aunt with breast cancer in her 70s.  Additionally her maternal grandmother had some sort of female cancer.  Her mother has metastatic melanoma and her father also had melanoma.    Review of Systems   Constitutional: Negative for activity change, appetite change, chills, diaphoresis, fatigue, fever and unexpected weight change.   Respiratory: Negative for apnea, cough, choking, chest tightness, shortness of breath, wheezing and stridor.    Cardiovascular: Negative for chest pain, palpitations and leg swelling.   Gastrointestinal: Negative for abdominal distention, abdominal pain, anal bleeding, blood in stool, constipation, diarrhea, nausea, rectal pain and vomiting.   Endocrine: Negative for cold intolerance, heat intolerance, polydipsia, polyphagia and polyuria.   Genitourinary: Negative for decreased urine volume, difficulty urinating, dyspareunia, dysuria, enuresis, flank pain, frequency, genital sores, hematuria, menstrual problem, pelvic pain, urgency, vaginal bleeding, vaginal discharge and vaginal pain.   Musculoskeletal: Negative for arthralgias, back pain, gait problem, joint swelling, myalgias, neck pain and neck stiffness.   Neurological: Negative for dizziness, tremors, seizures, syncope, facial asymmetry, speech difficulty, weakness, light-headedness, numbness and headaches.   Hematological: Negative for adenopathy.  Does not bruise/bleed easily.   Psychiatric/Behavioral: Negative for agitation, behavioral problems, confusion, decreased concentration, dysphoric mood, hallucinations, self-injury, sleep disturbance and suicidal ideas. The patient is not nervous/anxious and is not hyperactive.           Breast Health:  Age at menarche: 12  Age at first live birth: 29   .   Age at menopause: 50  Breastfeeding? yes, 6-9 months per child  HRT: no  Fertility Tx: no  OCP: yes, during college and early 20s     Allergies: Avocado, Chloraprep clear [chlorhexidin-isopropyl alcohol], Isopropyl alcohol, and Dermabond [liquid bandage (cyanoacrylate)]    Current Medications: has a current medication list which includes the following prescription(s): estradiol, fish oil-omega-3 fatty acids, iron, lactobac no.41/bifidobact no.7, levothyroxine, valacyclovir, and xiidra.    Past Medical History:  has a past medical history of Arthritis, At risk for venous thromboembolism (VTE) (2018), COVID-19 (2022), Hypothyroidism, Kidney stones, Leukopenia (2018), Motion sickness, PONV (postoperative nausea and vomiting), Seasonal allergies, and Snores.    Past Surgical History:  has a past surgical history that includes Joint replacement (Bilateral); Revision total knee arthroplasty (Right, ); Bunionectomy (Bilateral);  section; Nasal sinus surgery (2021); and Colonoscopy (2021).    Social History:   Social History     Tobacco Use   • Smoking status: Former     Types: Cigarettes     Passive exposure: Never   • Smokeless tobacco: Never   • Tobacco comments:     Social in college   Vaping Use   • Vaping Use: Never used   Substance Use Topics   • Alcohol use: Yes     Alcohol/week: 1.0 standard drink     Types: 1 Glasses of wine per week     Comment: occas   • Drug use: No       Family History: family history includes Breast cancer in her father's sister; COPD in her biological mother; Cancer in her maternal grandmother;  "Hypertension in her biological mother; Melanoma in her biological father and biological mother; Skin cancer in her biological father.        Physical Exam:    Vitals:   Visit Vitals  BP (!) 104/99   Pulse (!) 104   Temp 36.7 °C (98.1 °F)   Ht 1.473 m (4' 10\")   Wt 53.6 kg (118 lb 3.2 oz)   LMP 12/03/2019   SpO2 99%   BMI 24.70 kg/m²     Body mass index is 24.7 kg/m².    Physical Exam  Physical Examination performed in the supine and sitting position  BREAST SIZE:medium   SYMMETRY no left larger than right      SKIN - Skin color, texture, turgor normal. No rashes or lesions Skin is without tethering, dimpling, retraction or increased vascularity  AREOLA - normal    NIPPLES -  normal without retraction and without discharge  LYMPH NODES: infra, supra, cervical, parasternal and axillary nodes normal bilaterally  BREAST TISSUE: Right breast normal without discrete lesions. Left Breast  normal without discrete lesions.     Breast Imaging: I have personally reviewed the reports and images as follows.    12/22/2023: B screening mammogram - BI-RADS 0. Breast density C. Left breast asymmetry at posterior depth, no suspicious masses or calcifications.  1/10/2023: L diagnostic mammogram and ultrasound - BI-RADS 4. Breast density D. 4:30, 1-2 CMFN hypoechoic and avascular lesion with irregular margins and posterior acoustic shadowing measuring 4 x 6 x 7 mm for which biopsy is recommended. Additional lesions at 12:30, 4 CMFN and 1:30 3-4 CMFN which are likely fibroadenomas for which 6 month interval follow up is recommended.    Impression:  No problem-specific Assessment & Plan notes found for this encounter.  Hypodense, irregular lesion identified at 4:30, 1-2 CMFN for which biopsy is planned today.  In addition the patient has 2 additional lesions suspicious for fibroadenomas for which 6-month interval imaging is recommended.      Recommendation and Plan:   Risks and benefits of ultrasound-guided core needle biopsy discussed " with patient including but not limited to bleeding, infection, discomfort.  We discussed the placement of a clip for future localization.  Pathology is expected to result in 24 to 48 hours and we will call the patient with results.    All of the questions were answered.  The patient is in agreement with the treatment plan.      No follow-ups on file.        1/25/2023   9:41 AM      Luna Pendleton MD

## 2023-01-26 ENCOUNTER — TELEPHONE (OUTPATIENT)
Dept: SURGERY | Facility: CLINIC | Age: 53
End: 2023-01-26
Payer: COMMERCIAL

## 2023-01-26 DIAGNOSIS — R92.8 ABNORMAL FINDINGS ON DIAGNOSTIC IMAGING OF BREAST: Primary | ICD-10-CM

## 2023-01-26 LAB
CASE RPRT: NORMAL
CLINICAL INFO: NORMAL
PATH REPORT.FINAL DX SPEC: NORMAL
PATH REPORT.GROSS SPEC: NORMAL

## 2023-03-06 RX ORDER — LEVOTHYROXINE SODIUM 100 UG/1
TABLET ORAL
Qty: 90 TABLET | Refills: 1 | Status: SHIPPED | OUTPATIENT
Start: 2023-03-06 | End: 2023-09-06

## 2023-05-03 ENCOUNTER — OFFICE VISIT (OUTPATIENT)
Dept: PHYSICAL THERAPY | Facility: CLINIC | Age: 53
End: 2023-05-03

## 2023-05-03 DIAGNOSIS — M17.2 BILATERAL POST-TRAUMATIC OSTEOARTHRITIS OF KNEE: Primary | ICD-10-CM

## 2023-05-03 NOTE — PROGRESS NOTES
PT Evaluation     Today's date: 5/3/2023  Patient name: Kodi Mcnulty  : 1970  MRN: [id number]  Referring provider: Samy Shelton, PT  Dx:   Encounter Diagnosis     ICD-10-CM    1  Bilateral post-traumatic osteoarthritis of knee  M17 2                      Assessment  Assessment details: Kodi Mcnulty is a 52 y o  female presenting to outpatient physical therapy at [place] [address] with complaints of R>L knee pain  She presents with decreased range of motion, limited flexibility, decreased tolerance to activity and decreased functional mobility due to B knee adhesive capsulitis  She has long hx of B knee pain with multiple surgeries and manipulations as well as L YAMILETH with posterior approach 2 month ago with weakness but no pain  She would benefit from skilled PT services in order to address these deficits and reach maximum level of function  She presented to me via direct access  Impairments: abnormal or restricted ROM, activity intolerance, impaired balance, impaired physical strength, lacks appropriate home exercise program and pain with function  Barriers to therapy: None  Understanding of Dx/Px/POC: excellent   Prognosis: good    Goals  ST  Independent with HEP in 2 weeks  2  Increase ROM B knee flex PROM by 5 degrees in 2 weeks     LT  Achieve FOTO score of 65/100 in 4 weeks   2    Able to ambulate stairs normallly in 4 weeks      Plan  Patient would benefit from: skilled PT and PT eval  Planned modality interventions: cryotherapy, TENS and thermotherapy: hydrocollator packs  Other planned modality interventions: laser  Planned therapy interventions: ADL retraining, balance/weight bearing training, flexibility, functional ROM exercises, home exercise program, joint mobilization, manual therapy, neuromuscular re-education, strengthening, stretching, therapeutic activities and therapeutic exercise  Frequency: 2x week  Duration in weeks: 4  Plan of Care beginning date: 5/3/2023  Plan of Care expiration date: 2023  Treatment plan discussed with: patient        Subjective Evaluation    History of Present Illness  Mechanism of injury: Pt reports having B knee pain for many years  Had thigh surgery as an infant  In 2018 had B TKA with 2 manipulations in 2018 and 2018 for both without success  Had revision in 2019 of R knee and 2019 for L  Had PT about 2 years ago for the same issue with good improvement until a few months ago  Works as a  full time  Swims 3-4x/wk, but has very poor ROM that limits her on steps  Was able to walk up to 3 miles but now limited to 1 miles due to R>L knee pain  Had R posterior approach YAMILETH 2 month ago with good results but still weak  Recurrent probem    Quality of life: good    Pain  Current pain ratin  At best pain ratin  At worst pain ratin  Quality: tight and dull ache  Aggravating factors: stair climbing and walking  Progression: worsening    Social Support  Steps to enter house: yes  Stairs in house: yes   Lives in: multiple-level home    Employment status: working    Diagnostic Tests  No diagnostic tests performed  Treatments  Previous treatment: physical therapy  Patient Goals  Patient goals for therapy: decreased pain, increased motion, independence with ADLs/IADLs, return to sport/leisure activities and increased strength          Objective     Observations   Left Knee   Negative for effusion  Right Knee   Negative for effusion  Additional Observation Details  Multiple healed scars B knees       Neurological Testing     Sensation     Knee   Left Knee   Intact: light touch    Right Knee   Intact: light touch     Active Range of Motion   Left Knee   Flexion: 84 degrees   Extension: -11 degrees   Extensor la degrees     Right Knee   Flexion: 87 degrees   Extension: 0 degrees   Extensor la degrees     Passive Range of Motion   Left Knee   Flexion: 85 degrees   Extension: 0 degrees Right Knee   Flexion: 90 degrees   Extension: 0 degrees     Mobility   Tibiofemoral Mobility:   Left knee Hypomobile in the anterior and posterior tibiofemoral tendon(s)  Right knee Hypomobile in the anterior and posterior tibiofemoral tendon(s)  Strength/Myotome Testing     Left Hip   Planes of Motion   Flexion: 4+  Abduction: 4-    Right Hip   Planes of Motion   Flexion: 5  Abduction: 5    Left Knee   Flexion: 4+  Extension: 4+    Right Knee   Flexion: 4+  Extension: 4+    Ambulation     Comments   Slightly limited L knee ext with IC             POC EXPIRES On:  6/2/23  PRECAUTIONS:  None  CO-MORBIDITES:  B TKA  PERSONAL FACTORS:  None      Manuals HEP 5/3           Laser B knees  9'           Jt mobs B knees  4'           PROM B knees flex, L knee ext  6'           Patellar mobs B  4'           Neuro Re-Ed                                                                                                Ther Ex    Seated knee flex stretch sitting in chair                                                                                                        Ther Activity                              Gait Training                              Modalities

## 2023-05-08 ENCOUNTER — OFFICE VISIT (OUTPATIENT)
Dept: PHYSICAL THERAPY | Facility: CLINIC | Age: 53
End: 2023-05-08

## 2023-05-08 DIAGNOSIS — M17.2 BILATERAL POST-TRAUMATIC OSTEOARTHRITIS OF KNEE: Primary | ICD-10-CM

## 2023-05-08 NOTE — PROGRESS NOTES
Daily Note     Today's date: 2023  Patient name: Aden Napier  : 1970  MRN: [id number]  Referring provider: Amalia Mesa, PT  Dx:   Encounter Diagnosis     ICD-10-CM    1  Bilateral post-traumatic osteoarthritis of knee  M17 2                      Subjective:  Pt reports feeling about the same  Objective:  See treatment diary below      Assessment:  Pt presented to outpatient physical therapy at [place] [address] with complaints of R>L knee pain  She presented with decreased range of motion, limited flexibility, decreased tolerance to activity and decreased functional mobility due to B knee adhesive capsulitis  She has long hx of B knee pain with multiple surgeries and manipulations as well as L YAMILETH with posterior approach 2 months ago with weakness but no pain  Better B knee ext ROM today but still mod limited knee flex ROM  She will continue to benefit from skilled PT services in order to address these deficits and reach maximum level of function  She presented to me via direct access  Plan:  Continue current tx x 2 weeks  Increase B knee flex ROM and patellar mobility         POC EXPIRES On:  23  PRECAUTIONS:  None  CO-MORBIDITES:  B TKA  PERSONAL FACTORS:  None      Manuals HEP 5/3 5/8          Laser B knees  9' 9'          Jt mobs B knees  4' 4'          PROM B knees flex, L knee ext  6' 6'          Patellar mobs B  4' 4'          Neuro Re-Ed                                                                                                Ther Ex    Seated knee flex stretch sitting in chair                                                                                                        Ther Activity                              Gait Training                              Modalities

## 2023-05-11 ENCOUNTER — OFFICE VISIT (OUTPATIENT)
Dept: PHYSICAL THERAPY | Facility: CLINIC | Age: 53
End: 2023-05-11

## 2023-05-11 DIAGNOSIS — M17.2 BILATERAL POST-TRAUMATIC OSTEOARTHRITIS OF KNEE: Primary | ICD-10-CM

## 2023-05-11 NOTE — PROGRESS NOTES
Daily Note     Today's date: 2023  Patient name: Dusty Valle  : 1970  MRN: [id number]  Referring provider: Terrence Brandon PT  Dx:   Encounter Diagnosis     ICD-10-CM    1  Bilateral post-traumatic osteoarthritis of knee  M17 2                      Subjective:  Pt reports feeling OK  Objective:  See treatment diary below      Assessment:  Pt presented to outpatient physical therapy at [place] [address] with complaints of R>L knee pain  She presented with decreased range of motion, limited flexibility, decreased tolerance to activity and decreased functional mobility due to B knee adhesive capsulitis  She has long hx of B knee pain with multiple surgeries and manipulations as well as L YAMILETH with posterior approach 2 months ago with weakness but no pain  Better B knee ext ROM again today slightly  She will continue to benefit from skilled PT services in order to address these deficits and reach maximum level of function  She presented to me via direct access  Plan:  Continue current tx x 2 weeks  Increase B knee flex ROM and patellar mobility         POC EXPIRES On:  23  PRECAUTIONS:  None  CO-MORBIDITES:  B TKA  PERSONAL FACTORS:  None      Manuals HEP 5/3 5/8 5/11         Laser B knees  9' 9' 9'         Jt mobs B knees  4' 4' 4'         PROM B knees flex, L knee ext  6' 6' 6'         Patellar mobs B  4' 4' 4'         Neuro Re-Ed                                                                                                Ther Ex    Seated knee flex stretch sitting in chair                                                                                                        Ther Activity                              Gait Training                              Modalities

## 2023-05-15 ENCOUNTER — OFFICE VISIT (OUTPATIENT)
Dept: PHYSICAL THERAPY | Facility: CLINIC | Age: 53
End: 2023-05-15

## 2023-05-15 DIAGNOSIS — M17.2 BILATERAL POST-TRAUMATIC OSTEOARTHRITIS OF KNEE: Primary | ICD-10-CM

## 2023-05-15 NOTE — PROGRESS NOTES
Daily Note     Today's date: 5/15/2023  Patient name: Ar Dobson  : 1970  MRN: [id number]  Referring provider: Vitor Pineda PT  Dx:   Encounter Diagnosis     ICD-10-CM    1  Bilateral post-traumatic osteoarthritis of knee  M17 2                      Subjective:  Pt reports feeling better since lats week  No pain at all swimming last week  Down stairs is still difficult  Objective:  See treatment diary below      Assessment:  Pt presented to outpatient physical therapy at [facility] with complaints of R>L knee pain  She presented with decreased range of motion, limited flexibility, decreased tolerance to activity and decreased functional mobility due to B knee adhesive capsulitis  She has long hx of B knee pain with multiple surgeries and manipulations as well as L YAMILETH with posterior approach 2 months ago with weakness but no pain  Better B knee ext ROM again with a small adhesion release in R knee today  Knee ext ROM is now WNL B  She will continue to benefit from skilled PT services in order to address these deficits and reach maximum level of function  She presented to me via direct access  Plan:  Continue current tx x 1-2 weeks  Increase B knee flex ROM and patellar mobility         POC EXPIRES On:  23  PRECAUTIONS:  None  CO-MORBIDITES:  B TKA  PERSONAL FACTORS:  None      Manuals HEP 5/3 5/8 5/11 5/15        Laser B knees  9' 9' 9' 9'        Jt mobs B knees  4' 4' 4' 4'        PROM B knees flex, L knee ext  6' 6' 6' 6'        Patellar mobs B  4' 4' 4' 4'        Neuro Re-Ed                                                                                                Ther Ex    Seated knee flex stretch sitting in chair                                                                                                        Ther Activity                              Gait Training                              Modalities

## 2023-05-17 ENCOUNTER — OFFICE VISIT (OUTPATIENT)
Dept: PHYSICAL THERAPY | Facility: CLINIC | Age: 53
End: 2023-05-17

## 2023-05-17 DIAGNOSIS — M17.2 BILATERAL POST-TRAUMATIC OSTEOARTHRITIS OF KNEE: Primary | ICD-10-CM

## 2023-05-17 NOTE — PROGRESS NOTES
Daily Note     Today's date: 2023  Patient name: Chela Limon  : 1970  MRN: [id number]  Referring provider: Michelle Swain, PT  Dx:   Encounter Diagnosis     ICD-10-CM    1  Bilateral post-traumatic osteoarthritis of knee  M17 2                      Subjective:  Pt reports feeling better recently  Objective:  See treatment diary below      Assessment:  Pt presented to outpatient physical therapy at [place] [address] with complaints of R>L knee pain  She presented with decreased range of motion, limited flexibility, decreased tolerance to activity and decreased functional mobility due to B knee adhesive capsulitis  She has long hx of B knee pain with multiple surgeries and manipulations as well as L YAMILETH with posterior approach 2-3 months ago with weakness but no pain  She continues to have small increases in B knee flex ROM and patellar mobility after each PT session  B knee ext ROM is WNL B now  She will continue to benefit from skilled PT services in order to address these deficits and reach maximum level of function  She presented to me via direct access  She is now able to swim without pain but still with min difficulty down steps  Plan:  Continue current tx x 1 weeks then likely D/C to HEP  Increase B knee flex ROM and patellar mobility         POC EXPIRES On:  23  PRECAUTIONS:  None  CO-MORBIDITES:  B TKA  PERSONAL FACTORS:  None      Manuals HEP 5/3 5/8 5/11 5/15 5/17       Laser B knees  9' 9' 9' 9' 9'       Jt mobs B knees  4' 4' 4' 4' 4'       PROM B knees flex, L knee ext  6' 6' 6' 6' 6'       Patellar mobs B  4' 4' 4' 4' 4'       Neuro Re-Ed                                                                                                Ther Ex    Seated knee flex stretch sitting in chair                                                                                                        Ther Activity                              Gait Training Modalities

## 2023-05-23 ENCOUNTER — OFFICE VISIT (OUTPATIENT)
Dept: PHYSICAL THERAPY | Facility: CLINIC | Age: 53
End: 2023-05-23

## 2023-05-23 DIAGNOSIS — M17.2 BILATERAL POST-TRAUMATIC OSTEOARTHRITIS OF KNEE: Primary | ICD-10-CM

## 2023-05-23 NOTE — PROGRESS NOTES
Daily Note     Today's date: 2023  Patient name: Deandra Levine  : 1970  MRN: [id number]  Referring provider: Rigo Waters PT  Dx:   Encounter Diagnosis     ICD-10-CM    1  Bilateral post-traumatic osteoarthritis of knee  M17 2                      Subjective:  Pt reports feeling better recently  Only having difficulty on steps  Able to walk 2-3 miles well now  Objective:  See treatment diary below      Assessment:  Pt presented to outpatient physical therapy at [place] [address] with complaints of R>L knee pain  She presented with decreased range of motion, limited flexibility, decreased tolerance to activity and decreased functional mobility due to B knee adhesive capsulitis  She has long hx of B knee pain with multiple surgeries and manipulations as well as L YAMILETH with posterior approach 2-3 months ago with weakness but no pain  She continues to have small increases in B knee flex ROM and patellar mobility after each PT session  B knee ext ROM is WNL B now and only min limited patellar mobility B  She will continue to benefit from skilled PT services in order to address these deficits and reach maximum level of function  She presented to me via direct access  Plan:  D/C to HEP on 23         POC EXPIRES On:  23  PRECAUTIONS:  None  CO-MORBIDITES:  B TKA  PERSONAL FACTORS:  None      Manuals HEP 5/3 5/8 5/11 5/15 5/17 5/23      Laser B knees  9' 9' 9' 9' 9' 9'      Jt mobs B knees  4' 4' 4' 4' 4' 4'      PROM B knees flex, L knee ext  6' 6' 6' 6' 6' 6'      Patellar mobs B  4' 4' 4' 4' 4' 4'      Neuro Re-Ed                                                                                                Ther Ex    Seated knee flex stretch sitting in chair                                                                                                        Ther Activity                              Gait Training                              Modalities

## 2023-05-25 ENCOUNTER — OFFICE VISIT (OUTPATIENT)
Dept: PHYSICAL THERAPY | Facility: CLINIC | Age: 53
End: 2023-05-25

## 2023-05-25 DIAGNOSIS — M17.2 BILATERAL POST-TRAUMATIC OSTEOARTHRITIS OF KNEE: Primary | ICD-10-CM

## 2023-05-25 NOTE — PROGRESS NOTES
Daily Note + D/C    Today's date: 2023  Patient name: Julieta Sam  : 1970  MRN: [id number]  Referring provider: Jareth Levine PT  Dx:   Encounter Diagnosis     ICD-10-CM    1  Bilateral post-traumatic osteoarthritis of knee  M17 2                      Subjective:  Pt reports feeling much better recently  Only having difficulty on steps  Able to walk 2-3 miles well now  Objective:  See treatment diary below      Assessment:  Pt presented to outpatient physical therapy at [place] [address] with complaints of R>L knee pain  She presented with decreased range of motion, limited flexibility, decreased tolerance to activity and decreased functional mobility due to B knee adhesive capsulitis  She has long hx of B knee pain with multiple surgeries and manipulations as well as L YAMILETH with posterior approach 2-3 months ago with weakness but no pain  She continues to have small increases in B knee flex ROM and patellar mobility after each PT session and feels she is back to her baseline B knee ROM now without pain  She has met her goals and is ready for D/C to HEP at this time           Plan:  D/C PT         POC EXPIRES On:  23  PRECAUTIONS:  None  CO-MORBIDITES:  B TKA  PERSONAL FACTORS:  None      Manuals HEP 5/3 5/8 5/11 5/15 5/17 5/23 5/25     Laser B knees  9' 9' 9' 9' 9' 9' 9'     Jt mobs B knees  4' 4' 4' 4' 4' 4' 4'     PROM B knees flex, L knee ext  6' 6' 6' 6' 6' 6' 6'     Patellar mobs B  4' 4' 4' 4' 4' 4' 4'     Neuro Re-Ed                                                                                                Ther Ex    Seated knee flex stretch sitting in chair                                                                                                        Ther Activity                              Gait Training                              Modalities

## 2023-08-22 ENCOUNTER — HOSPITAL ENCOUNTER (OUTPATIENT)
Dept: RADIOLOGY | Age: 53
Discharge: HOME | End: 2023-08-22
Attending: SURGERY
Payer: COMMERCIAL

## 2023-08-22 DIAGNOSIS — R92.8 ABNORMAL FINDINGS ON DIAGNOSTIC IMAGING OF BREAST: ICD-10-CM

## 2023-08-22 PROCEDURE — 76642 ULTRASOUND BREAST LIMITED: CPT | Mod: LT

## 2023-08-22 PROCEDURE — 77065 DX MAMMO INCL CAD UNI: CPT | Mod: LT

## 2023-08-23 NOTE — RESULT ENCOUNTER NOTE
Phone call to Sharon to review results of recent breast imaging.  She is aware of benign findings.  She is aware she is due for her annual mammogram in December.  All of her current questions were answered.

## 2023-09-06 RX ORDER — LEVOTHYROXINE SODIUM 100 UG/1
TABLET ORAL
Qty: 90 TABLET | Refills: 1 | Status: SHIPPED | OUTPATIENT
Start: 2023-09-06 | End: 2024-03-05

## 2023-09-08 ENCOUNTER — OFFICE VISIT (OUTPATIENT)
Dept: PRIMARY CARE | Facility: CLINIC | Age: 53
End: 2023-09-08
Payer: COMMERCIAL

## 2023-09-08 VITALS
SYSTOLIC BLOOD PRESSURE: 102 MMHG | TEMPERATURE: 97.6 F | RESPIRATION RATE: 16 BRPM | OXYGEN SATURATION: 99 % | HEART RATE: 75 BPM | BODY MASS INDEX: 22.88 KG/M2 | DIASTOLIC BLOOD PRESSURE: 70 MMHG | WEIGHT: 109 LBS | HEIGHT: 58 IN

## 2023-09-08 DIAGNOSIS — E03.9 HYPOTHYROIDISM, UNSPECIFIED TYPE: ICD-10-CM

## 2023-09-08 DIAGNOSIS — Z00.00 ROUTINE PHYSICAL EXAMINATION: Primary | ICD-10-CM

## 2023-09-08 PROBLEM — H02.429 MYOGENIC PTOSIS: Status: ACTIVE | Noted: 2023-09-08

## 2023-09-08 PROBLEM — H02.30 EXCESS SKIN OF EYELID: Status: ACTIVE | Noted: 2023-09-08

## 2023-09-08 PROCEDURE — 3008F BODY MASS INDEX DOCD: CPT | Performed by: FAMILY MEDICINE

## 2023-09-08 PROCEDURE — 99396 PREV VISIT EST AGE 40-64: CPT | Performed by: FAMILY MEDICINE

## 2023-09-08 RX ORDER — CYCLOSPORINE 0.5 MG/ML
EMULSION OPHTHALMIC
COMMUNITY
Start: 2023-09-06

## 2023-09-08 ASSESSMENT — ENCOUNTER SYMPTOMS
CHILLS: 0
SORE THROAT: 0
COUGH: 0
RHINORRHEA: 0
FREQUENCY: 0
ABDOMINAL PAIN: 0
MYALGIAS: 0
CONSTIPATION: 0
NERVOUS/ANXIOUS: 0
FATIGUE: 0
HEADACHES: 0
PALPITATIONS: 0
SLEEP DISTURBANCE: 0
DYSURIA: 0
HEMATURIA: 0
FEVER: 0
WHEEZING: 0
DIZZINESS: 0
BLOOD IN STOOL: 0
ARTHRALGIAS: 0
SHORTNESS OF BREATH: 0

## 2023-09-08 ASSESSMENT — PATIENT HEALTH QUESTIONNAIRE - PHQ9: SUM OF ALL RESPONSES TO PHQ9 QUESTIONS 1 & 2: 0

## 2023-09-08 NOTE — PROGRESS NOTES
Subjective      Patient ID: Sharon Morales is a 53 y.o. female.    She is here for physical. Pt has been doing pretty good overall. She is trying to follow well balanced meals. Stays well hydrated. She is swimming 3x wk and walking other days and does lift weights 2x wk. Average 7hrs of sleep a night but does sleep well overall. UTD with colonoscopy 2021 and due in 5yrs. UTD with gyn care. UTD with breast imaging. UTD with eye and dental exam. UTD with derm check regularly. Due for fasting labs.       The following have been reviewed and updated as appropriate in this visit:   Tobacco  Allergies  Meds  Problems  Med Hx  Surg Hx  Fam Hx       Review of Systems   Constitutional: Negative for chills, fatigue and fever.   HENT: Negative for ear discharge, ear pain, postnasal drip, rhinorrhea and sore throat.    Respiratory: Negative for cough, shortness of breath and wheezing.    Cardiovascular: Negative for chest pain and palpitations.   Gastrointestinal: Negative for abdominal pain, blood in stool and constipation.   Genitourinary: Negative for dysuria, frequency and hematuria.   Musculoskeletal: Negative for arthralgias and myalgias.   Skin: Negative for rash.   Neurological: Negative for dizziness and headaches.   Psychiatric/Behavioral: Negative for sleep disturbance and suicidal ideas. The patient is not nervous/anxious.        Current Outpatient Medications   Medication Sig Dispense Refill    estradioL (ESTRACE) 0.01 % (0.1 mg/gram) vaginal cream USE VAGINALLY AS DIRECTED TWICE DAILY FOR 2 WEEKS, THEN TWICE WEEKLY THEREAFTER      FISH OIL-omega-3 fatty acids (FISH OIL) 340-1,000 mg capsule Take by mouth 2 (two) times a day.      iron 18 mg tablet Take 65 mg by mouth 3 (three) times a week (Mon, Wed, Fri).      Lactobac no.41/Bifidobact no.7 (PROBIOTIC-10 ORAL) Take by mouth.      levothyroxine (SYNTHROID) 100 mcg tablet TAKE 1 TABLET BY MOUTH EVERY DAY 90 tablet 1    RESTASIS 0.05 % ophthalmic emulsion        valACYclovir (VALTREX) 1 gram tablet as needed.       No current facility-administered medications for this visit.     Past Medical History:   Diagnosis Date    Arthritis     Knees -sp B/L TKR    At risk for venous thromboembolism (VTE) 2018    COVID-19 2022    Hypothyroidism     Kidney stones     Leukopenia 2018    Motion sickness     PONV (postoperative nausea and vomiting)     Seasonal allergies     Snores      Family History   Problem Relation Age of Onset    Hypertension Biological Mother     COPD Biological Mother     Melanoma Biological Mother     Melanoma Biological Father     Skin cancer Biological Father     Cancer Maternal Grandmother     Breast cancer Father's Sister         70's     Past Surgical History:   Procedure Laterality Date    BREAST LUMPECTOMY Left 2023    s/p benign    BUNIONECTOMY Bilateral      SECTION      x 3    COLONOSCOPY  2021    every 5yrs    JOINT REPLACEMENT Bilateral     NASAL SINUS SURGERY  2021    REVISION TOTAL KNEE ARTHROPLASTY Right 2018    TOTAL HIP ARTHROPLASTY Left 2023     Social History     Socioeconomic History    Marital status:      Spouse name: Not on file    Number of children: 4    Years of education: Not on file    Highest education level: Not on file   Occupational History    Occupation: Social Workers   Tobacco Use    Smoking status: Former     Types: Cigarettes     Passive exposure: Never    Smokeless tobacco: Never    Tobacco comments:     Social in college   Vaping Use    Vaping Use: Never used   Substance and Sexual Activity    Alcohol use: Yes     Alcohol/week: 1.0 standard drink of alcohol     Types: 1 Glasses of wine per week     Comment: occas    Drug use: No    Sexual activity: Yes     Partners: Male   Other Topics Concern    Not on file   Social History Narrative    Not on file     Social Determinants of Health     Financial Resource Strain: Not on file   Food  "Insecurity: Not on file   Transportation Needs: Not on file   Physical Activity: Not on file   Stress: Not on file   Social Connections: Not on file   Intimate Partner Violence: Not on file   Housing Stability: Not on file     Allergies   Allergen Reactions    Avocado      Other reaction(s): vomiting    Chloraprep Clear [Chlorhexidin-Isopropyl Alcohol] Itching    Isopropyl Alcohol     Dermabond [Liquid Bandage (Cyanoacrylate)] Rash     surgical glue       Objective   Visit Vitals  /70 (BP Location: Left upper arm, Patient Position: Sitting)   Pulse 75   Temp 36.4 °C (97.6 °F) (Temporal)   Resp 16   Ht 1.473 m (4' 10\")   Wt 49.4 kg (109 lb) Comment: without shoes   LMP 12/03/2019   SpO2 99%   BMI 22.78 kg/m²     Physical Exam  Constitutional:       Appearance: Normal appearance.   HENT:      Right Ear: Tympanic membrane normal. There is no impacted cerumen.      Left Ear: Tympanic membrane normal. There is no impacted cerumen.      Nose: Nose normal. No rhinorrhea.      Mouth/Throat:      Mouth: Mucous membranes are moist.      Pharynx: Oropharynx is clear. No posterior oropharyngeal erythema.   Cardiovascular:      Rate and Rhythm: Normal rate and regular rhythm.      Heart sounds: No murmur heard.  Pulmonary:      Effort: Pulmonary effort is normal.      Breath sounds: Normal breath sounds. No wheezing.   Abdominal:      General: Bowel sounds are normal.      Palpations: Abdomen is soft.      Tenderness: There is no abdominal tenderness.   Musculoskeletal:         General: Normal range of motion.      Cervical back: Normal range of motion and neck supple.   Skin:     General: Skin is warm.      Findings: No rash.   Neurological:      General: No focal deficit present.      Mental Status: She is alert and oriented to person, place, and time.      Cranial Nerves: No cranial nerve deficit.   Psychiatric:         Mood and Affect: Mood normal.         Behavior: Behavior normal.         Assessment/Plan "   Problem List Items Addressed This Visit        Endocrine/Metabolic    Hypothyroidism    Relevant Orders    TSH    T4, free   Other Visit Diagnoses     Routine physical examination    -  Primary    Relevant Orders    Comprehensive metabolic panel    TSH    CBC and differential    Lipid panel      She is doing pretty good overall. We discussed diet and exercise. Will check updated fasting labs. UTD with eye and dental exam. UTD with gyn care. UTD with breast imaging. UTD with colonoscopy. UTD with immunizations.     Natalia Wilson, DO  9/8/2023

## 2023-09-18 LAB
ALBUMIN SERPL-MCNC: 4 G/DL (ref 3.6–5.1)
ALBUMIN/GLOB SERPL: 2 (CALC) (ref 1–2.5)
ALP SERPL-CCNC: 87 U/L (ref 37–153)
ALT SERPL-CCNC: 14 U/L (ref 6–29)
AST SERPL-CCNC: 23 U/L (ref 10–35)
BASOPHILS # BLD AUTO: 38 CELLS/UL (ref 0–200)
BASOPHILS NFR BLD AUTO: 1.2 %
BILIRUB SERPL-MCNC: 0.4 MG/DL (ref 0.2–1.2)
BUN SERPL-MCNC: 18 MG/DL (ref 7–25)
BUN/CREAT SERPL: ABNORMAL (CALC) (ref 6–22)
CALCIUM SERPL-MCNC: 9.1 MG/DL (ref 8.6–10.4)
CHLORIDE SERPL-SCNC: 106 MMOL/L (ref 98–110)
CHOLEST SERPL-MCNC: 167 MG/DL
CHOLEST/HDLC SERPL: 2.2 (CALC)
CO2 SERPL-SCNC: 29 MMOL/L (ref 20–32)
CREAT SERPL-MCNC: 0.57 MG/DL (ref 0.5–1.03)
EGFRCR SERPLBLD CKD-EPI 2021: 107 ML/MIN/1.73M2
EOSINOPHIL # BLD AUTO: 150 CELLS/UL (ref 15–500)
EOSINOPHIL NFR BLD AUTO: 4.7 %
ERYTHROCYTE [DISTWIDTH] IN BLOOD BY AUTOMATED COUNT: 13.1 % (ref 11–15)
GLOBULIN SER CALC-MCNC: 2 G/DL (CALC) (ref 1.9–3.7)
GLUCOSE SERPL-MCNC: 87 MG/DL (ref 65–99)
HCT VFR BLD AUTO: 35 % (ref 35–45)
HDLC SERPL-MCNC: 75 MG/DL
HGB BLD-MCNC: 11.6 G/DL (ref 11.7–15.5)
LDLC SERPL CALC-MCNC: 79 MG/DL (CALC)
LYMPHOCYTES # BLD AUTO: 838 CELLS/UL (ref 850–3900)
LYMPHOCYTES NFR BLD AUTO: 26.2 %
MCH RBC QN AUTO: 30.3 PG (ref 27–33)
MCHC RBC AUTO-ENTMCNC: 33.1 G/DL (ref 32–36)
MCV RBC AUTO: 91.4 FL (ref 80–100)
MONOCYTES # BLD AUTO: 349 CELLS/UL (ref 200–950)
MONOCYTES NFR BLD AUTO: 10.9 %
NEUTROPHILS # BLD AUTO: 1824 CELLS/UL (ref 1500–7800)
NEUTROPHILS NFR BLD AUTO: 57 %
NONHDLC SERPL-MCNC: 92 MG/DL (CALC)
PLATELET # BLD AUTO: 271 THOUSAND/UL (ref 140–400)
PMV BLD REES-ECKER: 10.2 FL (ref 7.5–12.5)
POTASSIUM SERPL-SCNC: 3.9 MMOL/L (ref 3.5–5.3)
PROT SERPL-MCNC: 6 G/DL (ref 6.1–8.1)
RBC # BLD AUTO: 3.83 MILLION/UL (ref 3.8–5.1)
SODIUM SERPL-SCNC: 142 MMOL/L (ref 135–146)
T4 FREE SERPL-MCNC: 1.4 NG/DL (ref 0.8–1.8)
TRIGL SERPL-MCNC: 45 MG/DL
TSH SERPL-ACNC: 0.39 MIU/L
WBC # BLD AUTO: 3.2 THOUSAND/UL (ref 3.8–10.8)

## 2023-09-20 ENCOUNTER — TELEPHONE (OUTPATIENT)
Dept: PRIMARY CARE | Facility: CLINIC | Age: 53
End: 2023-09-20
Payer: COMMERCIAL

## 2023-09-20 DIAGNOSIS — D64.9 ANEMIA, UNSPECIFIED TYPE: ICD-10-CM

## 2023-09-20 DIAGNOSIS — E03.9 HYPOTHYROIDISM, UNSPECIFIED TYPE: Primary | ICD-10-CM

## 2023-12-08 LAB
BASOPHILS # BLD AUTO: 22 CELLS/UL (ref 0–200)
BASOPHILS NFR BLD AUTO: 0.7 %
EOSINOPHIL # BLD AUTO: 112 CELLS/UL (ref 15–500)
EOSINOPHIL NFR BLD AUTO: 3.6 %
ERYTHROCYTE [DISTWIDTH] IN BLOOD BY AUTOMATED COUNT: 12.8 % (ref 11–15)
FERRITIN SERPL-MCNC: 15 NG/ML (ref 16–232)
HCT VFR BLD AUTO: 41.1 % (ref 35–45)
HGB BLD-MCNC: 13.5 G/DL (ref 11.7–15.5)
IRON SATN MFR SERPL: 42 % (CALC) (ref 16–45)
IRON SERPL-MCNC: 147 MCG/DL (ref 45–160)
LYMPHOCYTES # BLD AUTO: 1311 CELLS/UL (ref 850–3900)
LYMPHOCYTES NFR BLD AUTO: 42.3 %
MCH RBC QN AUTO: 31 PG (ref 27–33)
MCHC RBC AUTO-ENTMCNC: 32.8 G/DL (ref 32–36)
MCV RBC AUTO: 94.5 FL (ref 80–100)
MONOCYTES # BLD AUTO: 406 CELLS/UL (ref 200–950)
MONOCYTES NFR BLD AUTO: 13.1 %
NEUTROPHILS # BLD AUTO: 1249 CELLS/UL (ref 1500–7800)
NEUTROPHILS NFR BLD AUTO: 40.3 %
PLATELET # BLD AUTO: 263 THOUSAND/UL (ref 140–400)
PMV BLD REES-ECKER: 10.1 FL (ref 7.5–12.5)
RBC # BLD AUTO: 4.35 MILLION/UL (ref 3.8–5.1)
T4 FREE SERPL-MCNC: 1.2 NG/DL (ref 0.8–1.8)
TIBC SERPL-MCNC: 352 MCG/DL (CALC) (ref 250–450)
TSH SERPL-ACNC: 4.74 MIU/L
WBC # BLD AUTO: 3.1 THOUSAND/UL (ref 3.8–10.8)

## 2023-12-09 ENCOUNTER — TELEPHONE (OUTPATIENT)
Dept: PRIMARY CARE | Facility: CLINIC | Age: 53
End: 2023-12-09
Payer: COMMERCIAL

## 2023-12-09 DIAGNOSIS — E03.9 HYPOTHYROIDISM, UNSPECIFIED TYPE: Primary | ICD-10-CM

## 2024-02-28 LAB
T4 FREE SERPL-MCNC: 1.2 NG/DL (ref 0.8–1.8)
TSH SERPL-ACNC: 2.62 MIU/L

## 2024-03-05 ENCOUNTER — HOSPITAL ENCOUNTER (OUTPATIENT)
Dept: RADIOLOGY | Age: 54
Discharge: HOME | End: 2024-03-05
Attending: SURGERY
Payer: COMMERCIAL

## 2024-03-05 ENCOUNTER — TRANSCRIBE ORDERS (OUTPATIENT)
Dept: RADIOLOGY | Age: 54
End: 2024-03-05

## 2024-03-05 DIAGNOSIS — Z12.31 ENCOUNTER FOR SCREENING MAMMOGRAM FOR MALIGNANT NEOPLASM OF BREAST: ICD-10-CM

## 2024-03-05 DIAGNOSIS — R92.8 OTHER ABNORMAL AND INCONCLUSIVE FINDINGS ON DIAGNOSTIC IMAGING OF BREAST: ICD-10-CM

## 2024-03-05 DIAGNOSIS — Z12.31 ENCOUNTER FOR SCREENING MAMMOGRAM FOR MALIGNANT NEOPLASM OF BREAST: Primary | ICD-10-CM

## 2024-03-05 PROCEDURE — 77063 BREAST TOMOSYNTHESIS BI: CPT

## 2024-03-05 RX ORDER — LEVOTHYROXINE SODIUM 100 UG/1
TABLET ORAL
Qty: 90 TABLET | Refills: 1 | Status: SHIPPED | OUTPATIENT
Start: 2024-03-05 | End: 2024-08-29

## 2024-03-07 ENCOUNTER — OFFICE VISIT (OUTPATIENT)
Dept: PRIMARY CARE | Facility: CLINIC | Age: 54
End: 2024-03-07
Payer: COMMERCIAL

## 2024-03-07 VITALS
OXYGEN SATURATION: 99 % | HEIGHT: 58 IN | SYSTOLIC BLOOD PRESSURE: 104 MMHG | DIASTOLIC BLOOD PRESSURE: 64 MMHG | BODY MASS INDEX: 23.09 KG/M2 | TEMPERATURE: 97.9 F | HEART RATE: 78 BPM | WEIGHT: 110 LBS

## 2024-03-07 DIAGNOSIS — M75.51 BILATERAL SHOULDER BURSITIS: Primary | ICD-10-CM

## 2024-03-07 DIAGNOSIS — M75.52 BILATERAL SHOULDER BURSITIS: Primary | ICD-10-CM

## 2024-03-07 PROCEDURE — 3008F BODY MASS INDEX DOCD: CPT | Performed by: NURSE PRACTITIONER

## 2024-03-07 PROCEDURE — 99213 OFFICE O/P EST LOW 20 MIN: CPT | Performed by: NURSE PRACTITIONER

## 2024-03-07 RX ORDER — NAPROXEN 500 MG/1
500 TABLET ORAL 2 TIMES DAILY WITH MEALS
Qty: 30 TABLET | Refills: 0 | Status: SHIPPED | OUTPATIENT
Start: 2024-03-07 | End: 2024-09-03

## 2024-03-07 ASSESSMENT — ENCOUNTER SYMPTOMS
TREMORS: 0
FACIAL ASYMMETRY: 0
SPEECH DIFFICULTY: 0
HEADACHES: 0
NUMBNESS: 0
PSYCHIATRIC NEGATIVE: 1
WEAKNESS: 0
DIZZINESS: 0
LIGHT-HEADEDNESS: 0
SEIZURES: 0
CONSTITUTIONAL NEGATIVE: 1

## 2024-03-07 NOTE — ASSESSMENT & PLAN NOTE
XR of both shoulders   Naproxen 500 mg twice daily with food for 10 days   See Dr Santana sports medicine if no better

## 2024-03-07 NOTE — PATIENT INSTRUCTIONS
Thank you for allowing me to participate in your care, it was a pleasure to care for you today. Should any questions or concerns arise, please call or message me on My Chart.     XR of both shoulders   Naproxen 500 mg twice daily with food for 10 days   See Dr Santana sports medicine if no better

## 2024-03-07 NOTE — PROGRESS NOTES
[facility]   [address], [address]  [facility] [place] [address]  Phone: [phone]  Fax:[phone]        Subjective      Patient ID: Sharon Morales is a 53 y.o. female.  1970      numbness of R arm started about 1 year ago  , more when she is swimming , now in her sleep ,   No trauma  Avid swimmer, does this about 1 hour 3x weekly   Sometimes having tingling in her Left hand as well while she is swimming   FROM   No edema           The following have been reviewed and updated as appropriate in this visit:   Allergies  Meds  Problems       Review of Systems   Constitutional: Negative.    Musculoskeletal:        Tingling in both hands, no pain in shoulders but more with movement of shoulders and while she is swimming    Neurological: Negative for dizziness, tremors, seizures, syncope, facial asymmetry, speech difficulty, weakness, light-headedness, numbness and headaches.   Psychiatric/Behavioral: Negative.      Patient Active Problem List   Diagnosis   • Hypothyroidism   • Leukopenia   • Dry eyes   • Myogenic ptosis   • Excess skin of eyelid   • Bilateral shoulder bursitis      Past Medical History:   Diagnosis Date   • Arthritis     Knees -sp B/L TKR   • At risk for venous thromboembolism (VTE) 2018   • COVID-19 2022   • Hypothyroidism    • Kidney stones    • Leukopenia 2018   • Motion sickness    • PONV (postoperative nausea and vomiting)    • Seasonal allergies    • Snores      Past Surgical History:   Procedure Laterality Date   • BREAST LUMPECTOMY Left 2023    s/p benign   • BUNIONECTOMY Bilateral    •  SECTION      x 3   • COLONOSCOPY  2021    every 5yrs   • JOINT REPLACEMENT Bilateral    • NASAL SINUS SURGERY  2021   • REVISION TOTAL KNEE ARTHROPLASTY Right 2018   • TOTAL HIP ARTHROPLASTY Left 2023     Social History     Socioeconomic History   • Marital status:      Spouse name: None   • Number of children: 4   • Years of  "education: None   • Highest education level: None   Occupational History   • Occupation: Social Workers   Tobacco Use   • Smoking status: Former     Types: Cigarettes     Passive exposure: Never   • Smokeless tobacco: Never   • Tobacco comments:     Social in college   Vaping Use   • Vaping Use: Never used   Substance and Sexual Activity   • Alcohol use: Yes     Alcohol/week: 1.0 standard drink of alcohol     Types: 1 Glasses of wine per week     Comment: occas   • Drug use: No   • Sexual activity: Yes     Partners: Male     Objective     Vitals:    03/07/24 0945   BP: 104/64   BP Location: Left upper arm   Patient Position: Sitting   Pulse: 78   Temp: 36.6 °C (97.9 °F)   SpO2: 99%   Weight: 49.9 kg (110 lb)   Height: 1.473 m (4' 10\")     Body mass index is 22.99 kg/m².  Current Outpatient Medications   Medication Sig Dispense Refill   • estradioL (ESTRACE) 0.01 % (0.1 mg/gram) vaginal cream USE VAGINALLY AS DIRECTED TWICE DAILY FOR 2 WEEKS, THEN TWICE WEEKLY THEREAFTER     • FISH OIL-omega-3 fatty acids (FISH OIL) 340-1,000 mg capsule Take by mouth 2 (two) times a day.     • iron 18 mg tablet Take 65 mg by mouth 3 (three) times a week (Mon, Wed, Fri).     • Lactobac no.41/Bifidobact no.7 (PROBIOTIC-10 ORAL) Take by mouth.     • levothyroxine (SYNTHROID) 100 mcg tablet TAKE 1 TABLET BY MOUTH EVERY DAY 90 tablet 1   • naproxen (NAPROSYN) 500 mg tablet Take 1 tablet (500 mg total) by mouth 2 (two) times a day with meals. 30 tablet 0   • RESTASIS 0.05 % ophthalmic emulsion      • valACYclovir (VALTREX) 1 gram tablet as needed.       No current facility-administered medications for this visit.       Physical Exam  Vitals and nursing note reviewed.   Constitutional:       Appearance: Normal appearance.   Musculoskeletal:         General: No swelling, tenderness, deformity or signs of injury.      Right shoulder: Normal.      Left shoulder: Normal.      Comments: Tingling hands    Skin:     General: Skin is warm and dry. "   Neurological:      General: No focal deficit present.      Mental Status: She is alert and oriented to person, place, and time.   Psychiatric:         Mood and Affect: Mood normal.         Behavior: Behavior normal.         Assessment/Plan     Diagnoses and all orders for this visit:    Bilateral shoulder bursitis (Primary)  Assessment & Plan:  XR of both shoulders   Naproxen 500 mg twice daily with food for 10 days   See Dr Santana sports medicine if no better     Orders:  -     Ambulatory referral to Sports Medicine; Future  -     X-RAY SHOULDER BILATERAL 2+VW; Future    Other orders  -     naproxen (NAPROSYN) 500 mg tablet; Take 1 tablet (500 mg total) by mouth 2 (two) times a day with meals.         SYED Pedraza   3/7/2024   5

## 2024-03-12 ENCOUNTER — HOSPITAL ENCOUNTER (OUTPATIENT)
Dept: RADIOLOGY | Age: 54
Discharge: HOME | End: 2024-03-12
Attending: NURSE PRACTITIONER
Payer: COMMERCIAL

## 2024-03-12 DIAGNOSIS — M75.51 BILATERAL SHOULDER BURSITIS: ICD-10-CM

## 2024-03-12 DIAGNOSIS — M75.52 BILATERAL SHOULDER BURSITIS: ICD-10-CM

## 2024-03-12 PROCEDURE — 73030 X-RAY EXAM OF SHOULDER: CPT | Mod: 50

## 2024-04-24 NOTE — PROGRESS NOTES
Sports Medicine New Patient Progress Note       Primary Care Provider  Natalia Wilson DO    Referring Provider   Natalia Wilson DO      History of Present Illness   This is a 54 y.o. female presenting with a chief complaint of   Chief Complaint   Patient presents with    Bilateral shoulder pain     Symptoms for 1 year, worsening.  Took a course of Naproxen  for 15 days, which did calm the symptoms.   Right arm worse than left.   Sleeping and swimming cause the flare of symptoms.  Gets pins and needles in arm with swimming and sleeping.      R shoulder - worsening for the past year. She was swelling and getting tingling and numbness when she was doing freestyle - not during other strokes. She will wake up in the middle of the night and her arm was completely dead. In March, saw Phong, started on 2 weeks of naproxen which helped a lot. When it started a year ago, she had done a few new shoulder exercises and remembers thinking that one of the exercises didn't feel right. Same thing happened with machines at gym. No sudden pain. Has been swimming for decades. She mostly does endurance swims. No weakness. Pins and needles don't happen as much outside of swimming and sleeping. Sleeping numbness feels different than swimming pins and needles - sleeping is whole arm. Side sleeper - will have arm above head under pillow when it happens. No changes in temperature. There is some discomfort in her R shoulder itself but this is newer.     She recently developed neck pain after painting.     Past Medical History:   Diagnosis Date    Arthritis     Knees -sp B/L TKR    At risk for venous thromboembolism (VTE) 12/18/2018    COVID-19 05/2022    Hypothyroidism     Kidney stones     Leukopenia 12/18/2018    Motion sickness     PONV (postoperative nausea and vomiting)     Seasonal allergies     Snores      Past Surgical History:   Procedure Laterality Date    BREAST LUMPECTOMY Left 02/2023    s/p benign    BUNIONECTOMY  Bilateral      SECTION      x 3    COLONOSCOPY  2021    every 5yrs    JOINT REPLACEMENT Bilateral     NASAL SINUS SURGERY  2021    REVISION TOTAL KNEE ARTHROPLASTY Right 2018    TOTAL HIP ARTHROPLASTY Left 2023     Current Outpatient Medications on File Prior to Visit   Medication Sig Dispense Refill    estradioL (ESTRACE) 0.01 % (0.1 mg/gram) vaginal cream USE VAGINALLY AS DIRECTED TWICE DAILY FOR 2 WEEKS, THEN TWICE WEEKLY THEREAFTER      FISH OIL-omega-3 fatty acids (FISH OIL) 340-1,000 mg capsule Take by mouth 2 (two) times a day.      iron 18 mg tablet Take 65 mg by mouth 3 (three) times a week (Mon, Wed, Fri).      Lactobac no.41/Bifidobact no.7 (PROBIOTIC-10 ORAL) Take by mouth.      levothyroxine (SYNTHROID) 100 mcg tablet TAKE 1 TABLET BY MOUTH EVERY DAY 90 tablet 1    naproxen (NAPROSYN) 500 mg tablet Take 1 tablet (500 mg total) by mouth 2 (two) times a day with meals. 30 tablet 0    RESTASIS 0.05 % ophthalmic emulsion       valACYclovir (VALTREX) 1 gram tablet as needed.       No current facility-administered medications on file prior to visit.     Allergies   Allergen Reactions    Avocado      Other reaction(s): vomiting    Chloraprep Clear [Chlorhexidin-Isopropyl Alcohol] Itching    Isopropyl Alcohol     Dermabond [Liquid Bandage (Cyanoacrylate)] Rash     surgical glue     Family History   Problem Relation Age of Onset    Hypertension Biological Mother     COPD Biological Mother     Melanoma Biological Mother     Melanoma Biological Father     Skin cancer Biological Father     Cancer Maternal Grandmother     Breast cancer Father's Sister         70's       PHYSICAL EXAM:   There were no vitals filed for this visit.  GENERAL: NAD, comfortable, WDWN   RESP: Unlabored breathing   MSK:   Right SHOULDER:     INSPECTION:   Anterior - axillary fold, sternoclavicular joint and AC joint within normal limits    Lateral - Deltoid nl   Posterior - C-spine with normal lordosis    PALPATION:    No  tenderness AC joint   No tenderness clavicle   No tenderness acromion, greater tubercle   No tenderness bicipital groove/Anterior shoulder   C-spine non-ttp. Spine of scapula and inferior scapular angle non-ttp   TTP in R supraclavicular region    ROM:    Full ROM abduction   Full ROM external rotation   Full ROM internal rotation     STRENGTH TESTIN/5 supraspinatous/abduction with mild discomfort   5/5 external rotation   5/5 internal rotation   5/5 triceps   5/5 biceps     NEUROVASCULAR:    sensation to light touch intact (anterior elbow C5, thumb C6 middle finger C7, 5th digit C8).  DTRs Symmetric in biceps (c5), brachioradioalis (c6), and triceps (C7).     SPECIAL TESTS:   Biceps tendon Evaluation:   - Negative Speeds       Labrum Evaluation:   -No deep pain or click with Dysart's (Active compression test)     Impingement Evaluation:   -Negative Neer's/Crouch     Rotator Cuff Evaluation:   -Negative External Rotation Lag   -Negative Negative Belly press  -Discomfort with empty can    Negative adsons  Positive GALO  Pain of lateral shoulder with spurlings.        IMAGING:   The following images were independently reviewed by myself and discussed with patient   X-RAY SHOULDER BILATERAL 2+VW  Narrative: CLINICAL HISTORY:  M75.51: Bursitis of right shoulder  M75.52: Bursitis of left shoulder    TECHNIQUE: AP, Grashey, Y view and axillary view right shoulder.  AP, Grashey, Y view and axillary view left shoulder.    COMPARISON: None available    FINDINGS:  Right shoulder:  There is no fracture.  Alignment is maintained.  No bone lesion or erosion.  Glenohumeral joint space preserved.  Soft tissues within normal limits.    Left shoulder:  There is no fracture.  Alignment is maintained.  No bone lesion or erosion.  Glenohumeral joint space preserved.  Soft tissues within normal limits.  Impression: IMPRESSION:  No acute abnormality either shoulder.     PROCEDURES:   none     ASSESSMENT/PLAN:   This is a 54 y.o.  female p/f intermittent R arm numbness/tingling with certain overhead activities and newer R lateral shoulder pain as well as some neck pain. Her intermittent hand numbness with swimming seems c/w neurogenic TOS. No signs or symptoms of vascular issues. I am able to recreate symptoms with GALO testing. She does have R shoulder pain and neck pain that developed more recently. R shoulder c/w mild RTC tendinopathy of supraspinatus. No neck pain on palpation but did get radiating pain with spurlings to lateral shoulder. Will start with PT for everything. Will get cervical xray to eval for cervical ribs, althoguh low concern. RTO in 8 weeks.     Numbness and tingling in right hand  (primary encounter diagnosis)  Plan: Ambulatory referral to Physical Therapy    Thoracic outlet syndrome of right thoracic outlet  Plan: Ambulatory referral to Physical Therapy    Tendinopathy of rotator cuff, right  Plan: Ambulatory referral to Physical Therapy    Neck pain, chronic  Plan: X-RAY CERVICAL SPINE COMPLETE 4 OR 5 VIEWS,         Ambulatory referral to Physical Therapy    Chronic pain of both shoulders        Follow up: Return in about 8 weeks (around 6/21/2024).    Diagnosis and treatment options were discussed in detail with the patient who is in agreement with the plan.       Patient/Caregiver verbalizes understanding of teaching and instructions     F/U:  See instructions     Melvi Santana MD   [facility]

## 2024-04-26 ENCOUNTER — OFFICE VISIT (OUTPATIENT)
Dept: SURGERY | Facility: CLINIC | Age: 54
End: 2024-04-26
Payer: COMMERCIAL

## 2024-04-26 ENCOUNTER — HOSPITAL ENCOUNTER (OUTPATIENT)
Dept: RADIOLOGY | Facility: CLINIC | Age: 54
Discharge: HOME | End: 2024-04-26
Attending: FAMILY MEDICINE
Payer: COMMERCIAL

## 2024-04-26 VITALS — HEIGHT: 58 IN | BODY MASS INDEX: 23.09 KG/M2 | WEIGHT: 110 LBS

## 2024-04-26 DIAGNOSIS — M25.512 CHRONIC PAIN OF BOTH SHOULDERS: ICD-10-CM

## 2024-04-26 DIAGNOSIS — G89.29 NECK PAIN, CHRONIC: ICD-10-CM

## 2024-04-26 DIAGNOSIS — R20.0 NUMBNESS AND TINGLING IN RIGHT HAND: Primary | ICD-10-CM

## 2024-04-26 DIAGNOSIS — M25.511 CHRONIC PAIN OF BOTH SHOULDERS: ICD-10-CM

## 2024-04-26 DIAGNOSIS — G89.29 CHRONIC PAIN OF BOTH SHOULDERS: ICD-10-CM

## 2024-04-26 DIAGNOSIS — M54.2 NECK PAIN, CHRONIC: ICD-10-CM

## 2024-04-26 DIAGNOSIS — R20.2 NUMBNESS AND TINGLING IN RIGHT HAND: Primary | ICD-10-CM

## 2024-04-26 DIAGNOSIS — M67.911 TENDINOPATHY OF ROTATOR CUFF, RIGHT: ICD-10-CM

## 2024-04-26 DIAGNOSIS — G54.0 THORACIC OUTLET SYNDROME OF RIGHT THORACIC OUTLET: ICD-10-CM

## 2024-04-26 PROCEDURE — 3008F BODY MASS INDEX DOCD: CPT | Performed by: FAMILY MEDICINE

## 2024-04-26 PROCEDURE — 72050 X-RAY EXAM NECK SPINE 4/5VWS: CPT

## 2024-04-26 PROCEDURE — 99204 OFFICE O/P NEW MOD 45 MIN: CPT | Performed by: FAMILY MEDICINE

## 2024-05-02 ENCOUNTER — HOSPITAL ENCOUNTER (OUTPATIENT)
Dept: PHYSICAL THERAPY | Age: 54
Setting detail: THERAPIES SERIES
Discharge: HOME | End: 2024-05-02
Attending: FAMILY MEDICINE
Payer: COMMERCIAL

## 2024-05-02 DIAGNOSIS — R20.2 NUMBNESS AND TINGLING IN RIGHT HAND: ICD-10-CM

## 2024-05-02 DIAGNOSIS — M67.911 TENDINOPATHY OF ROTATOR CUFF, RIGHT: ICD-10-CM

## 2024-05-02 DIAGNOSIS — G89.29 NECK PAIN, CHRONIC: ICD-10-CM

## 2024-05-02 DIAGNOSIS — R20.0 NUMBNESS AND TINGLING IN RIGHT HAND: ICD-10-CM

## 2024-05-02 DIAGNOSIS — G54.0 THORACIC OUTLET SYNDROME OF RIGHT THORACIC OUTLET: ICD-10-CM

## 2024-05-02 DIAGNOSIS — M54.2 NECK PAIN, CHRONIC: ICD-10-CM

## 2024-05-02 PROCEDURE — 97161 PT EVAL LOW COMPLEX 20 MIN: CPT | Mod: GP

## 2024-05-02 PROCEDURE — 97530 THERAPEUTIC ACTIVITIES: CPT | Mod: GP

## 2024-05-03 NOTE — OP PT TREATMENT LOG
Exercises Performed today Sets/ Reps comments Current Session Time   Eval date    TEAMS 5/3  completed     Progress note         Progress note         Re eval  Due date:        THER ACT   CPT 24348       TOTAL TIME FOR SESSION 8-22 Minutes    Education  yes     anatomy, pathology, POC      HEP   yes          THER EX  CPT 02750      Group  CPT 91071 TOTAL TIME FOR SESSION     Not performed    treadmill       bike         ube       Nu step              supine       Chin tucks nv      DNF NV      Cervical rotation nv      Towel rotation              prone       Prone T  nv      Prone W  nv      Prone ER  nv             standing         w on wall/snow angels nv      1/2 kneel rotation              Update  HEP       NEURO RE-ED  CPT 25079       TOTAL TIME FOR SESSION Not performed     biodex balance            blue foam           SLS        rocker board           MANUAL  CPT 22371       TOTAL TIME FOR SESSION Not performed     STM/IASTM  nv         Jt mobs nv distraction     PROM       Passive stretch nv      MODALITIES    CPT  HP/CP 93916  ES unattended  [id number]    Mechanical Tx       TOTAL TIME FOR SESSION Not performed            ice pack           Heat       Traction

## 2024-05-03 NOTE — PROGRESS NOTES
Physical Therapy Evaluation    [facility] Fax: [phone]    PT EVALUATION FOR OUTPATIENT THERAPY    Patient: Sharon Morales    MRN: [id number]  : 1970 54 y.o.     Referring Physician: Melvi Santana MD  Date of Visit: 2024      Certification Dates:  24 through 24  1-2x a week      Recommended Frequency & Duration:    for up to 3 months     Diagnosis:   1. Neck pain, chronic    2. Numbness and tingling in right hand    3. Thoracic outlet syndrome of right thoracic outlet    4. Tendinopathy of rotator cuff, right        Chief Complaints:   Chief Complaint   Patient presents with    Pain    Dec ROM       Precautions:    Precautions additional comments:      Past Medical History:   Past Medical History:   Diagnosis Date    Arthritis     Knees -sp B/L TKR    At risk for venous thromboembolism (VTE) 2018    COVID-19 2022    Hypothyroidism     Kidney stones     Leukopenia 2018    Motion sickness     PONV (postoperative nausea and vomiting)     Seasonal allergies     Snores        Past Surgical History:   Past Surgical History:   Procedure Laterality Date    BREAST LUMPECTOMY Left 2023    s/p benign    BUNIONECTOMY Bilateral      SECTION      x 3    COLONOSCOPY  2021    every 5yrs    JOINT REPLACEMENT Bilateral     NASAL SINUS SURGERY  2021    REVISION TOTAL KNEE ARTHROPLASTY Right 2018    TOTAL HIP ARTHROPLASTY Left 2023         LEARNING ASSESSMENT    Assessment completed:  Yes    Learner name:  Sharon    Learner: Patient    Learning Barriers:  Learning barriers: No Barriers    Preferred Language: English     Needed: No    Education Provided:   Method: Discussion, Handout, and Demonstration  Readiness: acceptance  Response: Demonstrated understanding      CO-LEARNER ASSESSMENT:    Completed: No      Welcome letter discussed: Yes Patient provided with Welcome Letter, which includes attendance policy. Provided education regarding  cancellation and no-show policy. Education regarding the importance of participation and regular attendance to maximize goal attainment.       OBJECTIVE MEASUREMENTS/DATA:    Time In Session:  Start Time: 1400  Stop Time: 1500  Time Calculation (min): 60 min   Assessment and Plan - 05/03/24 0709          Assessment    Plan of Care reviewed and patient/family in agreement Yes     System Pathology/Pathophysiology Noted musculoskeletal;neuromuscular     Functional Limitations in Following Categories (PT Eval) self-care;community/leisure     Rehab Potential/Prognosis good, to achieve stated therapy goals     Problem List decreased ROM;pain     Clinical Assessment Sharon is a 54 yof who presents to physical therapy with CC of right arm pain with numbess and tingling specfically when swimming and sleeping. Exam today appeared negative for rotator cuff pathology as well as thoracic outlet as her pulse remained strong through arm with elevation. Some limitations noted with neck ROM and TTP through right UT and scapular area. No increase in symtoms with cervical compression though she noted that cervical distraction felt good. She was positive for neural tension in the median nerve. She will benefit from skilled PT to address these impairments to restore her PLOF.     Plan and Recommendations Initate POC     Planned Services CPT 98945 Manual therapy;CPT 98662 Neuromuscular Reeducation;CPT 47339 Self-care/Home management training;CPT 49755 Therapeutic activities;CPT 32614 Therapeutic exercises;CPT 76125 Hot/Cold Packs therapy;CPT 48073 Mechanical traction                    General Information - 05/02/24 1400          Session Details    Document Type initial evaluation     Mode of Treatment individual therapy        General Information    Referring Physician Dr. Santana     History of present illness/functional impairment ~1 year, started noticing when swimming would feel numbness and tingling in right arm. Would also wake up at  times and the whole arm would feel dead/numb. More recently was working on a house peeling wallpaper a few months ago which started the neck pain. The symptoms into arm still persist, and the neck feels like a dull ache.                    Pain/Vitals - 05/02/24 1400          Pain Assessment    Currently in pain Yes     Preferred Pain Scale number (Numeric Rating Pain Scale)     Pain: Body location Neck     Pain Rating (0-10): Pre Activity 2     Pain Rating (0-10): Post Activity 2        Pain Intervention    Intervention  IE     Post Intervention Comments IE                    Falls/Food Screening - 05/02/24 1400          Initial Falls Assessment    One or more falls in the last year No        Food Insecurity    Within the past 12 months, you worried that your food would run out before you got the money to buy more. Never true     Within the past 12 months, the food you bought just didn't last and you didn't have money to get more. Never true                    PT - 05/02/24 1400          Physical Therapy    Physical Therapy Specialty Orth[facility] Sports PT        PT Plan    Frequency of treatment --     PT Duration 3 months     PT Custom Frequency and Duration 1-2x a week     PT Cert From 05/02/24     PT Cert To 07/25/24     Date PT POC was sent to provider 05/02/24     Signed PT Plan of Care received?  No                       Living Environment   PLOF:    Prior Level of Function - 05/02/24 1400          OTHER    Previous level of function Able to swim and sleep without limitations                   ROM    Range of Motion - 05/02/24 1400          RIGHT: Upper Extremity AROM Assessment    Right UE AROM normal WNL        Cervical AROM    Cervical Flexion 30     Cervical Extension 50     Cervical Left SB 20     Cervical Right SB 30     Cervical Left Rotation 70     Cervical Right Rotation 70                   MMT    Manual Muscle Tests - 05/03/24 1204          RIGHT: Upper Extremity Manual Muscle Test Assessment     Shoulder Flexion gross movement (5/5) normal     Shoulder Abduction gross movement (5/5) normal     Shoulder Internal Rotation gross movement (5/5) normal     Shoulder External Rotation gross movement (5/5) normal        Additional Manual Muscle Tests    Additional MMT DNF: 20 seconds                   Palpation    Palpation - 05/03/24 1204          Palpation    Neck Palpation  +TTP right UT and cervical paraspinals R>L                   Ortho Special Tests    Orthopedic Special Tests - 05/03/24 1200          Cervical/Thoracic    Cervical Compression Negative     Cervical Distraction Negative     Spurling's Right - Negative;Left - Negative     Femi Right - Negative                   Outcome Measures    PT Outcome Measures - 05/03/24 0709          Subjective Outcome Measures    NDI 9/50        Other Outcome Measures Used/Comments    Other outcome measure used: UEFI: 74/80                      Goals        Mutually agreed upon pain goal      Mutually agreed upon pain goal: Reduce arm pain and numbness discomfort to 2/10        Neck       Time Frame Result Comment/ProgresShort Term Goalss   Reduce pain to 0/10 at rest  4 wks     Pr will improve C- spine ROM by 15 degrees 4 wks     Pt will perform cervical retractions exercises without pain independently 4 wks     Pt will perform a dynamic warm up to start therapy and no longer need modalities 12 wks     NDI will improve by 5 points  12 wks     HEP independent and sxs free 12 wks              Patient specific goal      Patient specific goal: Be able to swim and sleep without                 TREATMENT PLAN:    Exercises Performed today Sets/ Reps comments Current Session Time   Eval date    TEAMS 5/3  completed     Progress note         Progress note         Re eval  Due date:        THER ACT   CPT 20977       TOTAL TIME FOR SESSION 8-22 Minutes    Education  yes     anatomy, pathology, POC      HEP   yes          THER EX  CPT 42120      Group  CPT 81778 TOTAL TIME FOR  SESSION     Not performed    treadmill       bike         ube       Nu step              supine       Chin tucks nv      DNF NV      Cervical rotation nv      Towel rotation              prone       Prone T  nv      Prone W  nv      Prone ER  nv             standing         w on wall/snow angels nv      1/2 kneel rotation              Update  HEP       NEURO RE-ED  CPT 11248       TOTAL TIME FOR SESSION Not performed     biodex balance            blue foam           SLS        rocker board           MANUAL  CPT 81759       TOTAL TIME FOR SESSION Not performed     STM/IASTM  nv         Jt mobs nv distraction     PROM       Passive stretch nv      MODALITIES    CPT  HP/CP 84676  ES unattended  [id number]    Mechanical Tx       TOTAL TIME FOR SESSION Not performed            ice pack           Heat       Traction              ASSESSMENT:    This 54 y.o. year old female presents to PT with above stated diagnosis. Physical Therapy evaluation reveals decreased ROM, pain resulting in self-care, community/leisure limitations. Sharon Morales will benefit from skilled PT services to address limitation, work towards rehab and patient goals and maximize PLOF of chosen ADLs.     Planned Services: The patient's treatment will include CPT 99852 Manual therapy, CPT 39550 Neuromuscular Reeducation, CPT 77014 Self-care/Home management training, CPT 51633 Therapeutic activities, CPT 94816 Therapeutic exercises, CPT 54852 Hot/Cold Packs therapy, CPT 53090 Mechanical traction, .     Olivia Becerril, PT

## 2024-05-07 ENCOUNTER — HOSPITAL ENCOUNTER (OUTPATIENT)
Dept: PHYSICAL THERAPY | Age: 54
Setting detail: THERAPIES SERIES
Discharge: HOME | End: 2024-05-07
Attending: FAMILY MEDICINE
Payer: COMMERCIAL

## 2024-05-07 DIAGNOSIS — R20.0 NUMBNESS AND TINGLING IN RIGHT HAND: ICD-10-CM

## 2024-05-07 DIAGNOSIS — G89.29 NECK PAIN, CHRONIC: Primary | ICD-10-CM

## 2024-05-07 DIAGNOSIS — M54.2 NECK PAIN, CHRONIC: Primary | ICD-10-CM

## 2024-05-07 DIAGNOSIS — M67.911 TENDINOPATHY OF ROTATOR CUFF, RIGHT: ICD-10-CM

## 2024-05-07 DIAGNOSIS — G54.0 THORACIC OUTLET SYNDROME OF RIGHT THORACIC OUTLET: ICD-10-CM

## 2024-05-07 DIAGNOSIS — R20.2 NUMBNESS AND TINGLING IN RIGHT HAND: ICD-10-CM

## 2024-05-07 PROCEDURE — 97140 MANUAL THERAPY 1/> REGIONS: CPT | Mod: GP

## 2024-05-07 PROCEDURE — 97110 THERAPEUTIC EXERCISES: CPT | Mod: GP

## 2024-05-07 NOTE — OP PT TREATMENT LOG
"Exercises Performed today Sets/ Reps comments Current Session Time   Eval date    TEAMS 5/3  completed     Progress note         Progress note         Re eval  Due date:        THER ACT   CPT 19590       TOTAL TIME FOR SESSION Not performed    Education  yes     anatomy, pathology, POC      HEP   yes          THER EX  CPT 47349      Group  CPT 92268 TOTAL TIME FOR SESSION     23-37 Minutes    treadmill       bike         ube Yes  8 min     Nu step              supine       Chin tucks nv      DNF NV      Cervical rotation nv      Towel rotation              prone       Prone T  Yes  2x10 Over PB     Prone W  nv      Prone ER  nv             standing         w on wall/snow angels nv      1/2 kneel rotation       Pec minor stretch Yes  3x30\" At wall     SCM stretch Yes  2x30\"     Scalene stretch Yes  2x30\"             Update  HEP       NEURO RE-ED  CPT 76736       TOTAL TIME FOR SESSION Not performed     biodex balance            blue foam           SLS        rocker board           MANUAL  CPT 63363       TOTAL TIME FOR SESSION 23-37 Minutes     STM/IASTM Yes     UT and scalenes, IASTM     Jt mobs Yes  1st rib     PROM       Passive stretch nv      MODALITIES    CPT  HP/CP 02129  ES unattended  16528    Mechanical Tx       TOTAL TIME FOR SESSION Not performed            ice pack           Heat       Traction            "

## 2024-05-07 NOTE — PROGRESS NOTES
Physical Therapy Visit    PT DAILY NOTE FOR OUTPATIENT THERAPY    Patient: Sharon Morales MRN: [id number]  : 1970 54 y.o.  Referring Physician: Melvi Santana MD  Date of Visit: 2024    Certification Dates: 24 through 24    Diagnosis:   1. Neck pain, chronic    2. Numbness and tingling in right hand    3. Thoracic outlet syndrome of right thoracic outlet    4. Tendinopathy of rotator cuff, right        Chief Complaints:  arm numbness    Precautions:          TODAY'S VISIT    Time In Session:  Start Time: 1200  Stop Time: 1300  Time Calculation (min): 60 min   History/Vitals/Pain/Encounter Info - 24 1157          Injury History/Precautions/Daily Required Info    Document Type daily treatment     Primary Therapist April Jone     Chief Complaint/Reason for Visit  arm numbness     Referring Physician Dr. Santana     History of present illness/functional impairment ~1 year, started noticing when swimming would feel numbness and tingling in right arm. Would also wake up at times and the whole arm would feel dead/numb. More recently was working on a house peeling wallpaper a few months ago which started the neck pain. The symptoms into arm still persist, and the neck feels like a dull ache.     Patient/Family/Caregiver Comments/Observations Tried changing the direction I turned my head while swimming and I was able to swim for 20 minutes without the tingling in my arm. It did feel irritated after the first session, not immediately but later that day and was feeling more irritated throughout the weekend. But I was also doing a lot of driving     Patient reported fall since last visit No        Pain Assessment    Currently in pain No/Denies                    Daily Treatment Assessment and Plan - 24 1346          Daily Treatment Assessment and Plan    Progress toward goals Progressing     Daily Outcome Summary Assessed Femi/elevated arm test vs Adson's which was performed last visit.  "This test was positive for neurological compression. Also tested whether pec minor compression would bring on symptoms. She noted this stretch felt good with some minor increase in paresthesia. Added SCM and scalene stretches this session to address areas of tightness in neck.     Plan and Recommendations assess response                         OBJECTIVE DATA TAKEN TODAY:    None taken    Today's Treatment:    Exercises Performed today Sets/ Reps comments Current Session Time   Eval date    TEAMS 5/3  completed     Progress note         Progress note         Re eval  Due date:        THER ACT   CPT 19704       TOTAL TIME FOR SESSION Not performed    Education  yes     anatomy, pathology, POC      HEP   yes          THER EX  CPT 14970      Group  CPT 31027 TOTAL TIME FOR SESSION     23-37 Minutes    treadmill       bike         ube Yes  8 min     Nu step              supine       Chin tucks nv      DNF NV      Cervical rotation nv      Towel rotation              prone       Prone T  Yes  2x10 Over PB     Prone W  nv      Prone ER  nv             standing         w on wall/snow angels nv      1/2 kneel rotation       Pec minor stretch Yes  3x30\" At wall     SCM stretch Yes  2x30\"     Scalene stretch Yes  2x30\"             Update  HEP       NEURO RE-ED  CPT 07072       TOTAL TIME FOR SESSION Not performed     biodex balance            blue foam           SLS        rocker board           MANUAL  CPT 52113       TOTAL TIME FOR SESSION 23-37 Minutes     STM/IASTM Yes     UT and scalenes, IASTM     Jt mobs Yes  1st rib     PROM       Passive stretch nv      MODALITIES    CPT  HP/CP 85171  ES unattended  [id number]    Mechanical Tx       TOTAL TIME FOR SESSION Not performed            ice pack           Heat       Traction                                   "

## 2024-05-09 ENCOUNTER — HOSPITAL ENCOUNTER (OUTPATIENT)
Dept: PHYSICAL THERAPY | Age: 54
Setting detail: THERAPIES SERIES
Discharge: HOME | End: 2024-05-09
Attending: FAMILY MEDICINE
Payer: COMMERCIAL

## 2024-05-09 DIAGNOSIS — G89.29 NECK PAIN, CHRONIC: Primary | ICD-10-CM

## 2024-05-09 DIAGNOSIS — M67.911 TENDINOPATHY OF ROTATOR CUFF, RIGHT: ICD-10-CM

## 2024-05-09 DIAGNOSIS — R20.2 NUMBNESS AND TINGLING IN RIGHT HAND: ICD-10-CM

## 2024-05-09 DIAGNOSIS — M54.2 NECK PAIN, CHRONIC: Primary | ICD-10-CM

## 2024-05-09 DIAGNOSIS — G54.0 THORACIC OUTLET SYNDROME OF RIGHT THORACIC OUTLET: ICD-10-CM

## 2024-05-09 DIAGNOSIS — R20.0 NUMBNESS AND TINGLING IN RIGHT HAND: ICD-10-CM

## 2024-05-09 PROCEDURE — 97140 MANUAL THERAPY 1/> REGIONS: CPT | Mod: GP

## 2024-05-09 PROCEDURE — 97110 THERAPEUTIC EXERCISES: CPT | Mod: GP

## 2024-05-09 NOTE — PROGRESS NOTES
Physical Therapy Visit    PT DAILY NOTE FOR OUTPATIENT THERAPY    Patient: Sharon Morales MRN: [id number]  : 1970 54 y.o.  Referring Physician: Melvi Santana MD  Date of Visit: 2024    Certification Dates: 24 through 24    Diagnosis:   1. Neck pain, chronic    2. Numbness and tingling in right hand    3. Thoracic outlet syndrome of right thoracic outlet    4. Tendinopathy of rotator cuff, right        Chief Complaints:  arm numbness    Precautions:          TODAY'S VISIT    Time In Session:  Start Time: 1200  Stop Time: 1300  Time Calculation (min): 60 min   History/Vitals/Pain/Encounter Info - 24 1206          Injury History/Precautions/Daily Required Info    Document Type daily treatment     Primary Therapist April Jone     Chief Complaint/Reason for Visit  arm numbness     Referring Physician Dr. Santana     History of present illness/functional impairment ~1 year, started noticing when swimming would feel numbness and tingling in right arm. Would also wake up at times and the whole arm would feel dead/numb. More recently was working on a house peeling wallpaper a few months ago which started the neck pain. The symptoms into arm still persist, and the neck feels like a dull ache.     Patient/Family/Caregiver Comments/Observations Was able to swim for a full 40 minutes turning my head the other way without arm numbess, and then had only very minor numnbess/tingles in the arm at night.     Patient reported fall since last visit No        Pain Assessment    Currently in pain No/Denies                    Daily Treatment Assessment and Plan - 24 1206          Daily Treatment Assessment and Plan    Progress toward goals Progressing     Daily Outcome Summary Performed 1st rib mobilization in sitting today for increased leverage and intensity. Near end of third round she noted feeling some numbness in her arm but it improved immediatly upson cessation. She also noted feeling  "improve motion in her neck and arm post. Progressed program with scapular and postural strengthening exercise which did not increase symptoms.     Plan and Recommendations cont to progress                         OBJECTIVE DATA TAKEN TODAY:    None taken    Today's Treatment:    Exercises Performed today Sets/ Reps comments Current Session Time   Eval date    TEAMS 5/3  completed     Progress note         Progress note         Re eval  Due date:        THER ACT   CPT 86721       TOTAL TIME FOR SESSION Not performed    Education  yes     anatomy, pathology, POC      HEP   yes          THER EX  CPT 48878      Group  CPT 17373 TOTAL TIME FOR SESSION     23-37 Minutes    treadmill       bike         ube Yes  8 min     Nu step              supine       Chin tucks nv      DNF NV      Cervical rotation nv      Towel rotation              prone       Prone T  n  2x10 Over PB     Prone W  nv      Prone ER  nv             standing         w on wall/snow angels Yes  2x10     No money yes 2x10  GTB    Horizontal abduc yes 2x10  GTB    Resisted kelly yes 2x10 GTB    High row with ER Yes  2x10 GTB    Wall taps Yes  2x5 4# MB     Pec minor stretch n  3x30\" At wall     SCM stretch n  2x30\"     Scalene stretch N   2x30\"             Update  HEP       NEURO RE-ED  CPT 51001       TOTAL TIME FOR SESSION Not performed     biodex balance            blue foam           SLS        rocker board           MANUAL  CPT 95380       TOTAL TIME FOR SESSION 23-37 Minutes     STM/IASTM Yes     UT and scalenes, IASTM     Jt mobs Yes  1st rib     PROM       Passive stretch nv      MODALITIES    CPT  HP/CP 15387  ES unattended  [id number]    Mechanical Tx       TOTAL TIME FOR SESSION Not performed            ice pack           Heat       Traction                                   "

## 2024-05-09 NOTE — OP PT TREATMENT LOG
"Exercises Performed today Sets/ Reps comments Current Session Time   Eval date    TEAMS 5/3  completed     Progress note         Progress note         Re eval  Due date:        THER ACT   CPT 97388       TOTAL TIME FOR SESSION Not performed    Education  yes     anatomy, pathology, POC      HEP   yes          THER EX  CPT 16905      Group  CPT 97840 TOTAL TIME FOR SESSION     23-37 Minutes    treadmill       bike         ube Yes  8 min     Nu step              supine       Chin tucks nv      DNF NV      Cervical rotation nv      Towel rotation              prone       Prone T  n  2x10 Over PB     Prone W  nv      Prone ER  nv             standing         w on wall/snow angels Yes  2x10     No money yes 2x10  GTB    Horizontal abduc yes 2x10  GTB    Resisted kelly yes 2x10 GTB    High row with ER Yes  2x10 GTB    Wall taps Yes  2x5 4# MB     Pec minor stretch n  3x30\" At wall     SCM stretch n  2x30\"     Scalene stretch N   2x30\"             Update  HEP       NEURO RE-ED  CPT 72071       TOTAL TIME FOR SESSION Not performed     biodex balance            blue foam           SLS        rocker board           MANUAL  CPT 53190       TOTAL TIME FOR SESSION 23-37 Minutes     STM/IASTM Yes     UT and scalenes, IASTM     Jt mobs Yes  1st rib     PROM       Passive stretch nv      MODALITIES    CPT  HP/CP 53979  ES unattended  [id number]    Mechanical Tx       TOTAL TIME FOR SESSION Not performed            ice pack           Heat       Traction            "

## 2024-05-14 ENCOUNTER — HOSPITAL ENCOUNTER (OUTPATIENT)
Dept: PHYSICAL THERAPY | Age: 54
Setting detail: THERAPIES SERIES
Discharge: HOME | End: 2024-05-14
Attending: FAMILY MEDICINE
Payer: COMMERCIAL

## 2024-05-14 DIAGNOSIS — M67.911 TENDINOPATHY OF ROTATOR CUFF, RIGHT: ICD-10-CM

## 2024-05-14 DIAGNOSIS — G54.0 THORACIC OUTLET SYNDROME OF RIGHT THORACIC OUTLET: ICD-10-CM

## 2024-05-14 DIAGNOSIS — G89.29 NECK PAIN, CHRONIC: Primary | ICD-10-CM

## 2024-05-14 DIAGNOSIS — R20.0 NUMBNESS AND TINGLING IN RIGHT HAND: ICD-10-CM

## 2024-05-14 DIAGNOSIS — M54.2 NECK PAIN, CHRONIC: Primary | ICD-10-CM

## 2024-05-14 DIAGNOSIS — R20.2 NUMBNESS AND TINGLING IN RIGHT HAND: ICD-10-CM

## 2024-05-14 PROCEDURE — 97110 THERAPEUTIC EXERCISES: CPT | Mod: GP

## 2024-05-14 PROCEDURE — 97140 MANUAL THERAPY 1/> REGIONS: CPT | Mod: GP

## 2024-05-14 NOTE — OP PT TREATMENT LOG
"Exercises Performed today Sets/ Reps comments Current Session Time   Eval date    TEAMS 5/3  completed     Progress note         Progress note         Re eval  Due date:        THER ACT   CPT 13090       TOTAL TIME FOR SESSION Not performed    Education  yes     anatomy, pathology, POC      HEP   yes          THER EX  CPT 77822      Group  CPT 38539 TOTAL TIME FOR SESSION     38-52 Minutes    treadmill       bike         ube Yes  8 min     Nu step              supine       Chin tucks nv      DNF NV      Cervical rotation nv      Towel rotation              prone       Prone T  Yes   2x10 Over PB     Prone W  nv      Prone ER  nv             standing         w on wall/snow angels Yes  2x10     No money yes 2x10  GTB    Horizontal abduc yes 2x10  GTB    Resisted kelly yes 2x10 GTB    High row with ER Yes  2x10 GTB    Wall taps n  2x5 4# MB     Wall clock Yes  2x5 rounds RTB b/l     Field goal Yes  2x10 YTB     Pec minor stretch n  3x30\" At wall     SCM stretch n  2x30\"     Scalene stretch N   2x30\"             Update  HEP       NEURO RE-ED  CPT 68339       TOTAL TIME FOR SESSION Not performed     biodex balance            blue foam           SLS        rocker board           MANUAL  CPT 12930       TOTAL TIME FOR SESSION 8-22 Minutes       STM/IASTM Yes     UT and scalenes, IASTM     Jt mobs Yes  1st rib     PROM       Passive stretch nv      MODALITIES    CPT  HP/CP 88452  ES unattended  [id number]    Mechanical Tx       TOTAL TIME FOR SESSION Not performed            ice pack           Heat       Traction            "

## 2024-05-15 NOTE — PROGRESS NOTES
Physical Therapy Visit    PT DAILY NOTE FOR OUTPATIENT THERAPY    Patient: Sharon Morales MRN: [id number]  : 1970 54 y.o.  Referring Physician: Melvi Santana MD  Date of Visit: 2024    Certification Dates: 24 through 24    Diagnosis:   1. Neck pain, chronic    2. Numbness and tingling in right hand    3. Thoracic outlet syndrome of right thoracic outlet    4. Tendinopathy of rotator cuff, right        Chief Complaints:  arm numbness    Precautions:          TODAY'S VISIT    Time In Session:  Start Time: 1400  Stop Time: 1500  Time Calculation (min): 60 min   History/Vitals/Pain/Encounter Info - 24 1444          Injury History/Precautions/Daily Required Info    Document Type daily treatment     Primary Therapist April Jone     Chief Complaint/Reason for Visit  arm numbness     Referring Physician Dr. Santana     History of present illness/functional impairment ~1 year, started noticing when swimming would feel numbness and tingling in right arm. Would also wake up at times and the whole arm would feel dead/numb. More recently was working on a house peeling wallpaper a few months ago which started the neck pain. The symptoms into arm still persist, and the neck feels like a dull ache.     Patient/Family/Caregiver Comments/Observations Neck has been doing a bit better, have continued to turn my head to the opposite side while swimming. Considering getting a snorkle that I can use for longer swims so I don't have to turn my head at all.     Patient reported fall since last visit No        Pain Assessment    Currently in pain No/Denies                    Daily Treatment Assessment and Plan - 05/15/24 1236          Daily Treatment Assessment and Plan    Progress toward goals Progressing     Daily Outcome Summary Continued to progress scapular activation and stability, as well as cerivcal parspinal activation. Only occasionally brief twinges of numbness or tingling in to arm during  "exercises.     Plan and Recommendations cont to progress                         OBJECTIVE DATA TAKEN TODAY:    None taken    Today's Treatment:    Exercises Performed today Sets/ Reps comments Current Session Time   Eval date    TEAMS 5/3  completed     Progress note         Progress note         Re eval  Due date:        THER ACT   CPT 88702       TOTAL TIME FOR SESSION Not performed    Education  yes     anatomy, pathology, POC      HEP   yes          THER EX  CPT 20816      Group  CPT 34254 TOTAL TIME FOR SESSION     38-52 Minutes    treadmill       bike         ube Yes  8 min     Nu step              supine       Chin tucks nv      DNF NV      Cervical rotation nv      Towel rotation              prone       Prone T  Yes   2x10 Over PB     Prone W  nv      Prone ER  nv             standing         w on wall/snow angels Yes  2x10     No money yes 2x10  GTB    Horizontal abduc yes 2x10  GTB    Resisted kelly yes 2x10 GTB    High row with ER Yes  2x10 GTB    Wall taps n  2x5 4# MB     Wall clock Yes  2x5 rounds RTB b/l     Field goal Yes  2x10 YTB     Pec minor stretch n  3x30\" At wall     SCM stretch n  2x30\"     Scalene stretch N   2x30\"             Update  HEP       NEURO RE-ED  CPT 83574       TOTAL TIME FOR SESSION Not performed     biodex balance            blue foam           SLS        rocker board           MANUAL  CPT 34008       TOTAL TIME FOR SESSION 8-22 Minutes       STM/IASTM Yes     UT and scalenes, IASTM     Jt mobs Yes  1st rib     PROM       Passive stretch nv      MODALITIES    CPT  HP/CP 69850  ES unattended  80011    Mechanical Tx       TOTAL TIME FOR SESSION Not performed            ice pack           Heat       Traction                                   "

## 2024-05-16 ENCOUNTER — HOSPITAL ENCOUNTER (OUTPATIENT)
Dept: PHYSICAL THERAPY | Age: 54
Setting detail: THERAPIES SERIES
Discharge: HOME | End: 2024-05-16
Attending: FAMILY MEDICINE
Payer: COMMERCIAL

## 2024-05-16 DIAGNOSIS — M67.911 TENDINOPATHY OF ROTATOR CUFF, RIGHT: ICD-10-CM

## 2024-05-16 DIAGNOSIS — R20.2 NUMBNESS AND TINGLING IN RIGHT HAND: ICD-10-CM

## 2024-05-16 DIAGNOSIS — R20.0 NUMBNESS AND TINGLING IN RIGHT HAND: ICD-10-CM

## 2024-05-16 DIAGNOSIS — G89.29 NECK PAIN, CHRONIC: Primary | ICD-10-CM

## 2024-05-16 DIAGNOSIS — M54.2 NECK PAIN, CHRONIC: Primary | ICD-10-CM

## 2024-05-16 DIAGNOSIS — G54.0 THORACIC OUTLET SYNDROME OF RIGHT THORACIC OUTLET: ICD-10-CM

## 2024-05-16 PROCEDURE — 97140 MANUAL THERAPY 1/> REGIONS: CPT | Mod: GP

## 2024-05-16 PROCEDURE — 97110 THERAPEUTIC EXERCISES: CPT | Mod: GP

## 2024-05-16 NOTE — OP PT TREATMENT LOG
"Exercises Performed today Sets/ Reps comments Current Session Time   Eval date    TEAMS 5/3  completed     Progress note         Progress note         Re eval  Due date:        THER ACT   CPT 21891       TOTAL TIME FOR SESSION Not performed    Education  yes     anatomy, pathology, POC      HEP   yes          THER EX  CPT 99237      Group  CPT 92923 TOTAL TIME FOR SESSION     38-52 Minutes    treadmill       bike         ube Yes  8 min     Nu step              supine       Chin tucks nv      DNF NV      Cervical rotation nv      Towel rotation              prone       Prone T  Yes   2x10 Over PB  Trialed y, arm numb   Prone W  nv      Prone ER  nv             Standing         w on wall/snow angels n  2x10     Shld ext Yes  2x10 GTB Palms up   No money n 2x10  GTB    Horizontal abduc yes 2x10  GTB    Resisted kelly n 2x10 GTB    High row with ER Yes  2x10 GTB And press up   Wall taps n  2x5 4# MB     Wall clock n  2x5 rounds RTB b/l     Field goal Yes  2x10 YTB     Pec minor stretch yes  3x30\" Supine w/ towel roll    SCM stretch n  2x30\"     PNF Yes  2x10  GTB    Scalene stretch N   2x30\"             Update  HEP       NEURO RE-ED  CPT 23162       TOTAL TIME FOR SESSION Not performed     biodex balance            blue foam           SLS        rocker board           MANUAL  CPT 94784       TOTAL TIME FOR SESSION 8-22 Minutes       STM/IASTM Yes     UT and scalenes, IASTM     Jt mobs Yes  1st rib     PROM       Passive stretch nv      MODALITIES    CPT  HP/CP 00459  ES unattended  [id number]    Mechanical Tx       TOTAL TIME FOR SESSION Not performed            ice pack           Heat       Traction            "

## 2024-05-17 NOTE — PROGRESS NOTES
Physical Therapy Visit    PT DAILY NOTE FOR OUTPATIENT THERAPY    Patient: Sharon Morales MRN: [id number]  : 1970 54 y.o.  Referring Physician: Melvi Santana MD  Date of Visit: 2024    Certification Dates: 24 through 24    Diagnosis:   1. Neck pain, chronic    2. Numbness and tingling in right hand    3. Thoracic outlet syndrome of right thoracic outlet    4. Tendinopathy of rotator cuff, right        Chief Complaints:  arm numbness    Precautions:          TODAY'S VISIT    Time In Session:  Start Time: 1300  Stop Time: 1355  Time Calculation (min): 55 min   History/Vitals/Pain/Encounter Info - 24 1357          Injury History/Precautions/Daily Required Info    Document Type daily treatment     Primary Therapist April Jone     Chief Complaint/Reason for Visit  arm numbness     Referring Physician Dr. Santana     History of present illness/functional impairment ~1 year, started noticing when swimming would feel numbness and tingling in right arm. Would also wake up at times and the whole arm would feel dead/numb. More recently was working on a house peeling wallpaper a few months ago which started the neck pain. The symptoms into arm still persist, and the neck feels like a dull ache.     Patient/Family/Caregiver Comments/Observations Been feeling better in the arm with swimming, not waking up as much in the night with the dead arm     Patient reported fall since last visit No        Pain Assessment    Currently in pain No/Denies                    Daily Treatment Assessment and Plan - 24 1357          Daily Treatment Assessment and Plan    Progress toward goals Progressing     Daily Outcome Summary Cont to progress scapular mm strengthening and postural exercises. Was able to complete new PREs without increased pain. Trialed performance of prone Y but this did increased symptoms into arm.     Plan and Recommendations scapular mm and cervical                         OBJECTIVE  "DATA TAKEN TODAY:    None taken    Today's Treatment:    Exercises Performed today Sets/ Reps comments Current Session Time   Eval date    TEAMS 5/3  completed     Progress note         Progress note         Re eval  Due date:        THER ACT   CPT 46424       TOTAL TIME FOR SESSION Not performed    Education  yes     anatomy, pathology, POC      HEP   yes          THER EX  CPT 35637      Group  CPT 29211 TOTAL TIME FOR SESSION     38-52 Minutes    treadmill       bike         ube Yes  8 min     Nu step              supine       Chin tucks nv      DNF NV      Cervical rotation nv      Towel rotation              prone       Prone T  Yes   2x10 Over PB  Trialed y, arm numb   Prone W  nv      Prone ER  nv             Standing         w on wall/snow angels n  2x10     Shld ext Yes  2x10 GTB Palms up   No money n 2x10  GTB    Horizontal abduc yes 2x10  GTB    Resisted kelly n 2x10 GTB    High row with ER Yes  2x10 GTB And press up   Wall taps n  2x5 4# MB     Wall clock n  2x5 rounds RTB b/l     Field goal Yes  2x10 YTB     Pec minor stretch yes  3x30\" Supine w/ towel roll    SCM stretch n  2x30\"     PNF Yes  2x10  GTB    Scalene stretch N   2x30\"             Update  HEP       NEURO RE-ED  CPT 28835       TOTAL TIME FOR SESSION Not performed     biodex balance            blue foam           SLS        rocker board           MANUAL  CPT 41396       TOTAL TIME FOR SESSION 8-22 Minutes       STM/IASTM Yes     UT and scalenes, IASTM     Jt mobs Yes  1st rib     PROM       Passive stretch nv      MODALITIES    CPT  HP/CP 49489  ES unattended  09836    Mechanical Tx       TOTAL TIME FOR SESSION Not performed            ice pack           Heat       Traction                                   "

## 2024-05-22 ENCOUNTER — HOSPITAL ENCOUNTER (OUTPATIENT)
Dept: PHYSICAL THERAPY | Age: 54
Setting detail: THERAPIES SERIES
Discharge: HOME | End: 2024-05-22
Attending: FAMILY MEDICINE
Payer: COMMERCIAL

## 2024-05-22 DIAGNOSIS — G54.0 THORACIC OUTLET SYNDROME OF RIGHT THORACIC OUTLET: ICD-10-CM

## 2024-05-22 DIAGNOSIS — G89.29 NECK PAIN, CHRONIC: Primary | ICD-10-CM

## 2024-05-22 DIAGNOSIS — M67.911 TENDINOPATHY OF ROTATOR CUFF, RIGHT: ICD-10-CM

## 2024-05-22 DIAGNOSIS — R20.0 NUMBNESS AND TINGLING IN RIGHT HAND: ICD-10-CM

## 2024-05-22 DIAGNOSIS — M54.2 NECK PAIN, CHRONIC: Primary | ICD-10-CM

## 2024-05-22 DIAGNOSIS — R20.2 NUMBNESS AND TINGLING IN RIGHT HAND: ICD-10-CM

## 2024-05-22 PROCEDURE — 97140 MANUAL THERAPY 1/> REGIONS: CPT | Mod: GP

## 2024-05-22 PROCEDURE — 97110 THERAPEUTIC EXERCISES: CPT | Mod: GP

## 2024-05-22 NOTE — PROGRESS NOTES
Physical Therapy Visit    PT DAILY NOTE FOR OUTPATIENT THERAPY    Patient: Sharon Morales MRN: [id number]  : 1970 54 y.o.  Referring Physician: Melvi Santana MD  Date of Visit: 2024    Certification Dates: 24 through 24    Diagnosis:   1. Neck pain, chronic    2. Numbness and tingling in right hand    3. Thoracic outlet syndrome of right thoracic outlet    4. Tendinopathy of rotator cuff, right        Chief Complaints:  arm numbness    Precautions:          TODAY'S VISIT    Time In Session:  Start Time: 1500  Stop Time: 1600  Time Calculation (min): 60 min   History/Vitals/Pain/Encounter Info - 24 1623          Injury History/Precautions/Daily Required Info    Document Type daily treatment     Primary Therapist April Jone     Chief Complaint/Reason for Visit  arm numbness     Referring Physician Dr. Santana     History of present illness/functional impairment ~1 year, started noticing when swimming would feel numbness and tingling in right arm. Would also wake up at times and the whole arm would feel dead/numb. More recently was working on a house peeling wallpaper a few months ago which started the neck pain. The symptoms into arm still persist, and the neck feels like a dull ache.     Patient/Family/Caregiver Comments/Observations Been feeling a lot better, mostly swimming with turning my head the other way.     Patient reported fall since last visit No        Pain Assessment    Currently in pain No/Denies                    Daily Treatment Assessment and Plan - 24 1623          Daily Treatment Assessment and Plan    Progress toward goals Progressing     Daily Outcome Summary Discussed POC based on current symptoms. In the next few weeks frequency has reduced to 1x a week followed by a several week hiatus while she is away traveling. Jose Raul gonsalves [place] in the next 2 visits.  Provided updated HEP this session with majority of exercise she has been working on in the [facility].      "Plan and Recommendations Review HEP, update as necessary, DC in 2 visits                         OBJECTIVE DATA TAKEN TODAY:    None taken    Today's Treatment:    Exercises Performed today Sets/ Reps comments Current Session Time   Eval date    TEAMS 5/3  completed     Progress note         Progress note         Re eval  Due date:        THER ACT   CPT 95072       TOTAL TIME FOR SESSION Not performed    Education  yes     anatomy, pathology, POC      HEP   yes          THER EX  CPT 14005      Group  CPT 77518 TOTAL TIME FOR SESSION     38-52 Minutes    treadmill       bike         ube Yes  8 min     Nu step              supine       Chin tucks nv      DNF NV      Cervical rotation nv      Towel rotation              prone       Prone T  n   2x10 Over PB  Trialed y, arm numb   Prone W  nv      Prone ER  nv             Standing         w on wall/snow angels n  2x10     Shld ext Yes  2x10 GTB Palms up   No money n 2x10  GTB    Horizontal abduc yes 2x10  GTB    Resisted kelly y 2x10 GTB    High row with ER Yes  2x10 GTB And press up   Wall taps n  2x5 4# MB     Wall clock y  2x5 rounds RTB b/l     Field goal Yes  2x10 YTB     Pec minor stretch yes  3x30\" Supine w/ towel roll    SCM stretch n  2x30\"     PNF Yes  2x10  GTB    Scalene stretch N   2x30\"             Update  HEP       NEURO RE-ED  CPT 50047       TOTAL TIME FOR SESSION Not performed     biodex balance            blue foam           SLS        rocker board           MANUAL  CPT 62597       TOTAL TIME FOR SESSION 8-22 Minutes       STM/IASTM Yes     UT and scalenes, IASTM     Jt mobs Yes  1st rib     PROM       Passive stretch nv      MODALITIES    CPT  HP/CP 70944  ES unattended  [id number]    Mechanical Tx       TOTAL TIME FOR SESSION Not performed            ice pack           Heat       Traction                                   "

## 2024-05-22 NOTE — OP PT TREATMENT LOG
"Exercises Performed today Sets/ Reps comments Current Session Time   Eval date    TEAMS 5/3  completed     Progress note         Progress note         Re eval  Due date:        THER ACT   CPT 35347       TOTAL TIME FOR SESSION Not performed    Education  yes     anatomy, pathology, POC      HEP   yes          THER EX  CPT 78757      Group  CPT 28621 TOTAL TIME FOR SESSION     38-52 Minutes    treadmill       bike         ube Yes  8 min     Nu step              supine       Chin tucks nv      DNF NV      Cervical rotation nv      Towel rotation              prone       Prone T  n   2x10 Over PB  Trialed y, arm numb   Prone W  nv      Prone ER  nv             Standing         w on wall/snow angels n  2x10     Shld ext Yes  2x10 GTB Palms up   No money n 2x10  GTB    Horizontal abduc yes 2x10  GTB    Resisted kelly y 2x10 GTB    High row with ER Yes  2x10 GTB And press up   Wall taps n  2x5 4# MB     Wall clock y  2x5 rounds RTB b/l     Field goal Yes  2x10 YTB     Pec minor stretch yes  3x30\" Supine w/ towel roll    SCM stretch n  2x30\"     PNF Yes  2x10  GTB    Scalene stretch N   2x30\"             Update  HEP       NEURO RE-ED  CPT 20372       TOTAL TIME FOR SESSION Not performed     biodex balance            blue foam           SLS        rocker board           MANUAL  CPT 53426       TOTAL TIME FOR SESSION 8-22 Minutes       STM/IASTM Yes     UT and scalenes, IASTM     Jt mobs Yes  1st rib     PROM       Passive stretch nv      MODALITIES    CPT  HP/CP 92808  ES unattended  [id number]    Mechanical Tx       TOTAL TIME FOR SESSION Not performed            ice pack           Heat       Traction            "

## 2024-05-31 ENCOUNTER — HOSPITAL ENCOUNTER (OUTPATIENT)
Dept: PHYSICAL THERAPY | Age: 54
Setting detail: THERAPIES SERIES
Discharge: HOME | End: 2024-05-31
Attending: FAMILY MEDICINE
Payer: COMMERCIAL

## 2024-05-31 DIAGNOSIS — R20.2 NUMBNESS AND TINGLING IN RIGHT HAND: ICD-10-CM

## 2024-05-31 DIAGNOSIS — G89.29 NECK PAIN, CHRONIC: Primary | ICD-10-CM

## 2024-05-31 DIAGNOSIS — M54.2 NECK PAIN, CHRONIC: Primary | ICD-10-CM

## 2024-05-31 DIAGNOSIS — M67.911 TENDINOPATHY OF ROTATOR CUFF, RIGHT: ICD-10-CM

## 2024-05-31 DIAGNOSIS — G54.0 THORACIC OUTLET SYNDROME OF RIGHT THORACIC OUTLET: ICD-10-CM

## 2024-05-31 DIAGNOSIS — R20.0 NUMBNESS AND TINGLING IN RIGHT HAND: ICD-10-CM

## 2024-05-31 PROCEDURE — 97140 MANUAL THERAPY 1/> REGIONS: CPT | Mod: GP

## 2024-05-31 PROCEDURE — 97110 THERAPEUTIC EXERCISES: CPT | Mod: GP

## 2024-06-25 NOTE — PROGRESS NOTES
Subjective      Patient ID: Sharon Morales is a 50 y.o. female.    She is here for PE. Pt has been doing well overall. She is trying to follow well balanced meals. Stays well hydrated. She is swimming 3x wk for 45min and lifting/walking as well. Average 7hrs of sleep a night. Normal BMs and urination. Postmenopausal. UTD with colonoscopy in 2019 and due in 3yrs. UTD with eye and dental exam. UTD with mammo. She did see heme for low white count and they think it genetic and haven't needed to go back. UTD with immunizations. Due for fasting labs.       The following have been reviewed and updated as appropriate in this visit:  Tobacco  Allergies  Meds  Problems  Med Hx  Surg Hx  Fam Hx       Review of Systems   Constitutional: Negative for chills, fatigue and fever.   HENT: Negative for ear pain, postnasal drip, rhinorrhea, sinus pressure, sinus pain and sore throat.    Eyes: Negative for pain and visual disturbance.   Respiratory: Negative for cough, chest tightness, shortness of breath and wheezing.    Cardiovascular: Negative for chest pain and palpitations.   Gastrointestinal: Negative for abdominal pain, constipation and diarrhea.   Genitourinary: Negative for decreased urine volume, frequency and hematuria.   Musculoskeletal: Negative for arthralgias and myalgias.   Skin: Negative for rash.   Neurological: Negative for dizziness, weakness and headaches.   Hematological: Does not bruise/bleed easily.   Psychiatric/Behavioral: Negative for behavioral problems. The patient is not nervous/anxious.        Current Outpatient Medications   Medication Sig Dispense Refill   • levothyroxine (SYNTHROID) 112 mcg tablet Take 1 tablet (112 mcg total) by mouth once daily. 30 tablet 1   • olopatadine (PATANOL) 0.1 % ophthalmic solution INSTILL 1 DROP INTO AFFECTED EYE(S) TWICE A DAY AT AN INTERVAL OF 6 TO 8 HOURS AS NEEDED 5 mL 2   • conjugated estrogens (PREMARIN) 0.625 mg/gram vaginal cream Insert 0.5 g into the vagina  [place] triage,        Patient called.  States she has had 5 back surgeries, recently had injection which did not help.    Requesting referral to [facility] in [place]  Fax #   [phone]    Please call patient when referral signed/faxed.  Ok to leave a voice message.    Thank you,  Leti Brown RN  [facility]     daily. 2x weekly 30 g 5     No current facility-administered medications for this visit.      Past Medical History:   Diagnosis Date   • Arthritis     Knees -sp B/L TKR   • At risk for venous thromboembolism (VTE) 2018   • Constipation 2018   • Hypothyroidism    • Kidney stones    • Last menstrual period (LMP) > 10 days ago 10/10/2018   • Leukopenia 2018   • Motion sickness    • Pain management 2018   • PONV (postoperative nausea and vomiting)    • Seasonal allergies    • Snores      Family History   Problem Relation Age of Onset   • Hypertension Biological Mother    • COPD Biological Mother    • Lymphoma Biological Mother    • Skin cancer Biological Father    • Stomach cancer Other      Past Surgical History:   Procedure Laterality Date   • BUNIONECTOMY Bilateral    •  SECTION      x 3   • JOINT REPLACEMENT Bilateral    • REVISION TOTAL KNEE ARTHROPLASTY Right 2018     Social History     Socioeconomic History   • Marital status:      Spouse name: Not on file   • Number of children: Not on file   • Years of education: Not on file   • Highest education level: Not on file   Occupational History   • Occupation: [employer]st   Social Needs   • Financial resource strain: Not on file   • Food insecurity:     Worry: Not on file     Inability: Not on file   • Transportation needs:     Medical: Not on file     Non-medical: Not on file   Tobacco Use   • Smoking status: Never Smoker   • Smokeless tobacco: Never Used   Substance and Sexual Activity   • Alcohol use: Yes     Alcohol/week: 1.0 standard drinks     Types: 1 Glasses of wine per week     Comment: occas   • Drug use: No   • Sexual activity: Yes     Partners: Male   Lifestyle   • Physical activity:     Days per week: Not on file     Minutes per session: Not on file   • Stress: Not on file   Relationships   • Social connections:     Talks on phone: Not on file     Gets together: Not on file     Attends [employer] service: Not on  "file     Active member of club or organization: Not on file     Attends meetings of clubs or organizations: Not on file     Relationship status: Not on file   • Intimate partner violence:     Fear of current or ex partner: Not on file     Emotionally abused: Not on file     Physically abused: Not on file     Forced sexual activity: Not on file   Other Topics Concern   • Not on file   Social History Narrative   • Not on file     Allergies   Allergen Reactions   • Avocado      Other reaction(s): vomiting       Objective   Visit Vitals  /62 (BP Location: Left upper arm, Patient Position: Sitting)   Pulse 88   Temp 36.8 °C (98.3 °F) (Oral)   Resp 16   Ht 1.473 m (4' 10\")   Wt 51.7 kg (114 lb)   LMP 12/03/2019   SpO2 98%   BMI 23.83 kg/m²     Physical Exam   Constitutional: She is oriented to person, place, and time. She appears well-developed and well-nourished.   HENT:   Head: Normocephalic and atraumatic.   Right Ear: External ear normal.   Left Ear: External ear normal.   Eyes: Pupils are equal, round, and reactive to light. Conjunctivae are normal.   Neck: Normal range of motion. Neck supple. No thyromegaly present.   Cardiovascular: Normal rate, regular rhythm and normal heart sounds.   No murmur heard.  Pulmonary/Chest: Effort normal and breath sounds normal. She has no wheezes.   Abdominal: Soft. Bowel sounds are normal. There is no tenderness.   Musculoskeletal: Normal range of motion. She exhibits no tenderness.   Lymphadenopathy:     She has no cervical adenopathy.   Neurological: She is alert and oriented to person, place, and time. No cranial nerve deficit.   Skin: Skin is warm and dry. No rash noted.   Psychiatric: She has a normal mood and affect.   Nursing note and vitals reviewed.      Assessment/Plan   Problem List Items Addressed This Visit        Endocrine/Metabolic    Hypothyroidism    Relevant Medications    levothyroxine (SYNTHROID) 112 mcg tablet    Other Relevant Orders    TSH    T4, free "       Hematologic    Leukopenia      Other Visit Diagnoses     Routine physical examination    -  Primary    Relevant Orders    Comprehensive metabolic panel    CBC    Lipid panel        She is doing well overall. Discussed diet and exercise. UTD with gyn care. UTD with colonoscopy. UTD with eye and dental exam. UTD with immunizations. Will check fasting labs.   Natalia Wilson, DO  8/3/2020

## 2024-06-26 ENCOUNTER — OFFICE VISIT (OUTPATIENT)
Dept: SURGERY | Facility: CLINIC | Age: 54
End: 2024-06-26
Payer: COMMERCIAL

## 2024-06-26 VITALS — WEIGHT: 110 LBS | BODY MASS INDEX: 23.09 KG/M2 | HEIGHT: 58 IN

## 2024-06-26 DIAGNOSIS — R20.2 NUMBNESS AND TINGLING IN RIGHT HAND: Primary | ICD-10-CM

## 2024-06-26 DIAGNOSIS — G54.0 THORACIC OUTLET SYNDROME OF RIGHT THORACIC OUTLET: ICD-10-CM

## 2024-06-26 DIAGNOSIS — M54.2 NECK PAIN, CHRONIC: ICD-10-CM

## 2024-06-26 DIAGNOSIS — R20.0 NUMBNESS AND TINGLING IN RIGHT HAND: Primary | ICD-10-CM

## 2024-06-26 DIAGNOSIS — G89.29 NECK PAIN, CHRONIC: ICD-10-CM

## 2024-06-26 PROCEDURE — 99213 OFFICE O/P EST LOW 20 MIN: CPT | Performed by: FAMILY MEDICINE

## 2024-06-26 PROCEDURE — 3008F BODY MASS INDEX DOCD: CPT | Performed by: FAMILY MEDICINE

## 2024-06-26 NOTE — PROGRESS NOTES
Sports Medicine Return Patient Progress Note       Primary Care Provider  Natalia Wilson DO    History of Present Illness   This is a 54 y.o. female presenting for follow up of   Chief Complaint   Patient presents with    Follow-up     Has switched sides breathing during swimming and has much less symptoms, about 75 % resolve.   Completed PT. Not as much shoulder pain. Still uncomfortable sleeping. Is now feeling feeling more neck stiffness, heaviness. Right up the middle. Feels crunching.        Did PT for 4 weeks. Told to switch sides breathing as well. She is having 75% improvement. Hasn't had dead arm in the middle of the night anymore. Shoulder pain is improved. She will have trouble getting comfortable at night. Stiffness in the neck - feeling crunching.     Past Medical History:   Diagnosis Date    Arthritis     Knees -sp B/L TKR    At risk for venous thromboembolism (VTE) 2018    COVID-19 2022    Hypothyroidism     Kidney stones     Leukopenia 2018    Motion sickness     PONV (postoperative nausea and vomiting)     Seasonal allergies     Snores      Past Surgical History:   Procedure Laterality Date    BREAST LUMPECTOMY Left 2023    s/p benign    BUNIONECTOMY Bilateral      SECTION      x 3    COLONOSCOPY  2021    every 5yrs    JOINT REPLACEMENT Bilateral     NASAL SINUS SURGERY  2021    REVISION TOTAL KNEE ARTHROPLASTY Right 2018    TOTAL HIP ARTHROPLASTY Left 2023     Current Outpatient Medications on File Prior to Visit   Medication Sig Dispense Refill    estradioL (ESTRACE) 0.01 % (0.1 mg/gram) vaginal cream USE VAGINALLY AS DIRECTED TWICE DAILY FOR 2 WEEKS, THEN TWICE WEEKLY THEREAFTER      FISH OIL-omega-3 fatty acids (FISH OIL) 340-1,000 mg capsule Take by mouth 2 (two) times a day.      iron 18 mg tablet Take 65 mg by mouth 3 (three) times a week (Mon, Wed, Fri).      Lactobac no.41/Bifidobact no.7 (PROBIOTIC-10 ORAL) Take by mouth.      levothyroxine (SYNTHROID)  100 mcg tablet TAKE 1 TABLET BY MOUTH EVERY DAY 90 tablet 1    naproxen (NAPROSYN) 500 mg tablet Take 1 tablet (500 mg total) by mouth 2 (two) times a day with meals. 30 tablet 0    RESTASIS 0.05 % ophthalmic emulsion       valACYclovir (VALTREX) 1 gram tablet as needed.       No current facility-administered medications on file prior to visit.     Allergies   Allergen Reactions    Avocado      Other reaction(s): vomiting    Chloraprep Clear [Chlorhexidin-Isopropyl Alcohol] Itching    Isopropyl Alcohol     Dermabond [Liquid Bandage (Cyanoacrylate)] Rash     surgical glue     Family History   Problem Relation Age of Onset    Hypertension Biological Mother     COPD Biological Mother     Melanoma Biological Mother     Melanoma Biological Father     Skin cancer Biological Father     Cancer Maternal Grandmother     Breast cancer Father's Sister         70's       PHYSICAL EXAM:   There were no vitals filed for this visit.  GENERAL: NAD, comfortable, WDWN   RESP: Unlabored breathing   MSK:   Right SHOULDER/Neck:     ROM:    Full ROM abduction      STRENGTH TESTIN/5 supraspinatous/abduction with no discomfort     SPECIAL TESTS:   Rotator Cuff Evaluation:   -No discomfort with empty can     Negative adsons  Mildly Positive GALO  No pain of lateral shoulder with spurlings.      IMAGING:   The following images were independently reviewed by myself and discussed with patient   X-RAY CERVICAL SPINE COMPLETE 4 OR 5 VIEWS  Narrative: CLINICAL HISTORY: Radiculopathy.    COMMENT: 6 views of the cervical spine were performed. No prior studies are  available for comparison.    There is disc space narrowing from C3 to T1 with associated endplate  spondylosis. There is minimal grade 1 retrolisthesis of C3 with respect to C4.  The vertebral body heights are maintained. There is mild multilevel foraminal  narrowing most pronounced on the right from C3-4 through C5-6. The prevertebral  soft tissues are unremarkable. The visualized  lung apices are clear.  Impression: IMPRESSION: Degenerative disc and degenerative joint disease     PROCEDURES:   none     ASSESSMENT/PLAN:   This is a 54 y.o. female p/f follow up of R arm tingling and pain. Now 75% better. Continue HEP, switching breathing sides. Will call if symptoms worsen.     Numbness and tingling in right hand  (primary encounter diagnosis)    Thoracic outlet syndrome of right thoracic outlet    Neck pain, chronic        Follow up: No follow-ups on file.    Diagnosis and treatment options were discussed in detail with the patient who is in agreement with the plan.       Patient/Caregiver verbalizes understanding of teaching and instructions     F/U:  See instructions     Melvi Santana MD   [facility]

## 2024-08-29 RX ORDER — LEVOTHYROXINE SODIUM 100 UG/1
TABLET ORAL
Qty: 90 TABLET | Refills: 1 | Status: SHIPPED | OUTPATIENT
Start: 2024-08-29 | End: 2025-03-03

## 2024-09-09 NOTE — PROGRESS NOTES
Daily Progress Note       Patient ID: Sharon Morales is a 48 y.o. female.    48 year old female presents with low back pain.       Back Pain   Episode onset: 3 days ago. The problem occurs constantly. The problem is unchanged. The pain is present in the lumbar spine. The quality of the pain is described as shooting. The pain does not radiate. The pain is moderate. The pain is the same all the time. The symptoms are aggravated by bending, twisting and position. Stiffness is present in the morning. Pertinent negatives include no abdominal pain, bladder incontinence, bowel incontinence, chest pain, dysuria, fever, headaches, leg pain, numbness, paresis, paresthesias, pelvic pain, perianal numbness, tingling, weakness or weight loss. Treatments tried: Massage therapy, lidocaine patch, oxycodone. The treatment provided mild relief.       The following have been reviewed and updated as appropriate in this visit:  Allergies  Meds  Problems       Review of Systems   Constitutional: Negative for fatigue, fever, unexpected weight change and weight loss.   HENT: Negative for trouble swallowing.    Eyes: Negative for visual disturbance.   Respiratory: Negative for cough, shortness of breath and wheezing.    Cardiovascular: Negative for chest pain, palpitations and leg swelling.   Gastrointestinal: Negative for abdominal distention, abdominal pain, blood in stool, bowel incontinence, constipation, diarrhea, nausea and vomiting.   Endocrine: Negative for cold intolerance and heat intolerance.   Genitourinary: Negative for bladder incontinence, difficulty urinating, dysuria and pelvic pain.   Musculoskeletal: Positive for back pain.   Skin: Negative for rash.   Neurological: Negative for dizziness, tingling, syncope, weakness, light-headedness, numbness, headaches and paresthesias.   Hematological: Negative for adenopathy.             Current Outpatient Prescriptions   Medication Sig Dispense Refill   • desonide (DESOWEN) 0.05 %  ointment Apply topically 2 (two) times a day. Using sparingly and rub gently into the area 60 g 0   • levothyroxine (SYNTHROID) 112 mcg tablet TAKE 1 TABLET BY MOUTH EVERY DAY 90 tablet 0   • olopatadine (PATANOL) 0.1 % ophthalmic solution 0.1 %.     • cyclobenzaprine (FLEXERIL) 5 mg tablet Take 1 tablet (5 mg total) by mouth 3 (three) times a day as needed for muscle spasms for up to 7 days. 21 tablet 0   • naproxen (NAPROSYN) 500 mg tablet Take 1 tablet (500 mg total) by mouth 2 (two) times a day with meals for 14 days. 28 tablet 0     No current facility-administered medications for this visit.      History reviewed. No pertinent past medical history.  History reviewed. No pertinent family history.  Past Surgical History:   Procedure Laterality Date   • TOTAL KNEE ARTHROPLASTY Right 01/2018    replace previous knee replacement     Social History     Social History   • Marital status:      Spouse name: N/A   • Number of children: N/A   • Years of education: N/A     Occupational History   • Not on file.     Social History Main Topics   • Smoking status: Never Smoker   • Smokeless tobacco: Never Used   • Alcohol use Yes   • Drug use: No   • Sexual activity: Not on file     Other Topics Concern   • Not on file     Social History Narrative   • No narrative on file     Allergies   Allergen Reactions   • Avocado      Other reaction(s): vomiting       Objective   No new labs.    Vitals:    10/10/18 1501   BP: 122/86   BP Location: Left upper arm   Patient Position: Sitting   Pulse: 66   Temp: 36.5 °C (97.7 °F)   TempSrc: Oral   SpO2: 99%   Weight: 54.4 kg (120 lb)         Physical Exam   Constitutional: She is oriented to person, place, and time. She appears well-developed and well-nourished.   HENT:   Head: Normocephalic and atraumatic.   Eyes: Conjunctivae and EOM are normal. Pupils are equal, round, and reactive to light.   Neck: Normal range of motion. Neck supple.   Cardiovascular: Normal rate, regular rhythm  and normal heart sounds.    Pulmonary/Chest: Effort normal and breath sounds normal. No respiratory distress.   Abdominal: Soft. Bowel sounds are normal. She exhibits no distension. There is no tenderness.   Musculoskeletal: Normal range of motion. She exhibits no edema.        Lumbar back: She exhibits spasm.   Neurological: She is alert and oriented to person, place, and time.   Skin: Skin is warm and dry.   Nursing note and vitals reviewed.      Assessment/Plan   Problem List Items Addressed This Visit     None      Visit Diagnoses     Spasm of muscle of lower back    -  Primary    Counseled about supportive care including stretching, massage therapy, heat and topical anesthetic patches. Will give trial of NSAIDs and muscle relaxants.     Relevant Medications    naproxen (NAPROSYN) 500 mg tablet    cyclobenzaprine (FLEXERIL) 5 mg tablet    Need for immunization against influenza        Relevant Orders    Influenza vaccine quadrivalent preservative free 6 mon and older IM (FluLaval) (Completed)      Advised patient to call back to the office if symptoms worsen or does not improve.     Saji Quick MD  10/10/2018   4 = No assist / stand by assistance

## 2024-10-11 ENCOUNTER — TRANSCRIBE ORDERS (OUTPATIENT)
Dept: REGISTRATION | Facility: REHABILITATION | Age: 54
End: 2024-10-11

## 2024-10-11 DIAGNOSIS — M76.891 HIP ABDUCTOR TENDONITIS, RIGHT: Primary | ICD-10-CM

## 2024-10-11 DIAGNOSIS — Z98.890 S/P TENDON REPAIR: ICD-10-CM

## 2024-10-29 ENCOUNTER — HOSPITAL ENCOUNTER (OUTPATIENT)
Dept: PHYSICAL THERAPY | Age: 54
Setting detail: THERAPIES SERIES
Discharge: HOME | End: 2024-10-29
Attending: FAMILY MEDICINE
Payer: COMMERCIAL

## 2024-10-29 DIAGNOSIS — M76.891 ADDUCTOR TENDINITIS OF RIGHT HIP: Primary | ICD-10-CM

## 2024-10-29 DIAGNOSIS — Z98.890 S/P TENDON REPAIR: ICD-10-CM

## 2024-10-29 PROCEDURE — 97530 THERAPEUTIC ACTIVITIES: CPT | Mod: GP

## 2024-10-29 PROCEDURE — 97161 PT EVAL LOW COMPLEX 20 MIN: CPT | Mod: GP

## 2024-10-29 RX ORDER — IBUPROFEN 600 MG/1
600 TABLET ORAL 3 TIMES DAILY PRN
COMMUNITY
Start: 2024-10-20

## 2024-10-29 NOTE — PROGRESS NOTES
Physical Therapy Evaluation    [facility] Fax: [phone]    PT EVALUATION FOR OUTPATIENT THERAPY    Patient: Sharon Morales    MRN: [id number]  : 1970 54 y.o.     Referring Physician: Damion Brown MD  Date of Visit: 10/29/2024      Certification Dates:  10/29/24 through 25         Recommended Frequency & Duration:  2 times/week for up to 8 weeks     Diagnosis:   1. Adductor tendinitis of right hip    2. S/P tendon repair        Chief Complaints: No chief complaint on file.      Precautions:    Precautions additional comments:      Past Medical History:   Past Medical History:   Diagnosis Date    Arthritis     Knees -sp B/L TKR    At risk for venous thromboembolism (VTE) 2018    COVID-19 2022    Hypothyroidism     Kidney stones     Leukopenia 2018    Motion sickness     PONV (postoperative nausea and vomiting)     Seasonal allergies     Snores        Past Surgical History:   Past Surgical History   Procedure Laterality Date    Breast lumpectomy Left 2023    s/p benign    Bunionectomy Bilateral      section      x 3    Colonoscopy  2021    every 5yrs    Joint replacement Bilateral     KNEE TOTAL REVISION Left 2018    Performed by Feng Gross MD at  OR    Nasal sinus surgery  2021    Revision total knee arthroplasty Right 2018    Total hip arthroplasty Left 2023         LEARNING ASSESSMENT    Assessment completed:  Yes    Learner name:  Sharon Morales    Learner: Patient    Learning Barriers:  Learning barriers: No Barriers    Preferred Language: English     Needed: No    Education Provided:   Method: Discussion  Readiness: acceptance  Response: Demonstrated understanding      CO-LEARNER ASSESSMENT:    Completed: No      Welcome letter discussed: Yes Patient provided with Welcome Letter, which includes attendance policy. Provided education regarding cancellation and no-show policy. Education regarding the importance of  participation and regular attendance to maximize goal attainment.       OBJECTIVE MEASUREMENTS/DATA:    Time In Session:  Start Time: 1202  Stop Time: 1302  Time Calculation (min): 60 min   Assessment and Plan - 10/29/24 1832          Assessment    System Pathology/Pathophysiology Noted musculoskeletal;neuromuscular     Functional Limitations in Following Categories (PT Eval) self-care;community/leisure     Rehab Potential/Prognosis good, to achieve stated therapy goals     Problem List decreased ROM;pain     Clinical Assessment Pt is a 54 yof who presents to PT with R hip pain s/p tenotomy for adductor strain performed on 10/10/2024. Initial muscle strain occurring July 2024 after resistance-based exercise. Pt presents with impaired R quad flexibility, decreased R hip ROM, genu valgus with functional squatting, decreased endurance, pain in R groin with STS transfers, decreased hip and knee strength, gait deviations, and increased hypertonicity of hip adductors and quadricep muscles resulting in functional limitations such as difficulty with bending, lifting, squatting, and stair negotiation. Pt would benefit from skilled PT services to address these deficits and restore maximal pain-free function.     Plan and Recommendations Progress as tolerated towards POC     Planned Services CPT 47295 Gait training;CPT 30347 Manual therapy;CPT 07214 Neuromuscular Reeducation;CPT 26157 Therapeutic activities;CPT 70678 Therapeutic exercises;CPT 69253 Electrical stimulation UNATTENDED;CPT 42901 Hot/Cold Packs therapy                    General Information - 10/29/24 1202          Session Details    Document Type initial evaluation     Mode of Treatment individual therapy        General Information    Referring Physician Damion Brown MD     History of present illness/functional impairment Pt reports R hip pain beginning in July 2024 and is arriving with diagnosis of adductor strain s/p tenotomy performed on 10/10/24.  Symptoms described to occur through anterior hip, groin, and upper thigh pain. During ILDEFONSO, pt describes increase in pain the night following resistance-based exercise workout. Two days later she attended a bar class which significantly aggravated pain. Pt visited ortho in August who diagnosed her with a groin tear. She was instructed to rest and only walk on the treadmill or swim to tolerance. Pt followed these instructions, however pain continued to gradually worsen with activity to the point where she could not stand or walk and was admitted to the ED on 9/8/24. Pt returned to ortho who recommended tenotomy occurring about 2.5 weeks ago. Pt required to rest following procedure for 2 weeks with no lifting, only light walking. Pt still having similar pain post procedure compared to prior level. Pt has difficulty with bending, lifting, squatting, rising from a chair, stair negotiation, getting in/out of bath, and getting in/out of a car. Pt had f/u with ortho “a few days ago” who recommended PT for eval and tx.   Significant PMHx includes: B/L knee replacement when she was 44yo, required revision for L one year later, and R the year after that; L hip replacement about 2 years ago (2022). Pt notes having gait deviations since her R knee replacement years ago which may have contributed to her symptoms.                    Pain/Vitals - 10/29/24 1202          Pain Assessment    Currently in pain Yes     Preferred Pain Scale number (Numeric Rating Pain Scale)     Pain Side/Orientation right     Pain: Body location Hip     Pain Rating (0-10): Pre Activity 4     Pain Level at Best 0     Pain Level at Worst 9     Pain Description intermittent;dull;woke from sleep;aching     Nonverbal Indicators of Pain activity pattern change        Pain Intervention    Intervention  IE     Post Intervention Comments IE                    Falls/Food Screening - 10/29/24 1202          Initial Falls Assessment    One or more falls in the last  year No        Food Insecurity    Within the past 12 months, you worried that your food would run out before you got the money to buy more. Never true     Within the past 12 months, the food you bought just didn't last and you didn't have money to get more. Never true                    PT - 10/29/24 1202          Physical Therapy    Physical [facility]        PT Plan    Frequency of treatment 2 times/week     PT Duration 8 weeks     PT Cert From 10/29/24     PT Cert To 01/21/25     Date PT POC was sent to provider 10/29/24     Signed PT Plan of Care received?  No                       PLOF:    Prior Level of Function - 10/29/24 1832          OTHER    Previous level of function Pt enjoys going on daily walks on treadmill, and swimming; pt would like to return to resistance training without flaring symptoms                   ROM    Range of Motion - 10/29/24 1200          RIGHT: Lower Extremity AROM Assessment    Hip Flexion   102 degrees     Hip Extension   8 degrees   Discomfort thorugh front of hip    Hip Abduction   45 degrees     Hip Internal Rotation   40 degrees     Hip External Rotation   30 degrees        LEFT: Lower Extremity AROM Assessment    Hip Flexion   118 degrees     Hip Extension   8 degrees     Hip Abduction   42 degrees     Hip Internal Rotation   35 degrees     Hip External Rotation   25 degrees                   MMT    Manual Muscle Tests - 10/29/24 1202          RIGHT: Lower Extremity Manual Muscle Test Assessment    Hip Flexion gross movement (4-/5) good minus     Hip Extension gross movement (4/5) good     Hip Abduction gross movement (4-/5) good minus     Hip Internal Rotation gross movement (4-/5) good minus     Hip External Rotation gross movement (3+/5) fair plus   pain in R groin    Knee Flexion strength (4/5) good     Knee Extension strength (4+/5) good plus        LEFT: Lower Extremity Manual Muscle Test Assessment    Hip Flexion gross movement (4+/5) good  plus     Hip Extension gross movement (4+/5) good plus     Hip Abduction gross movement (5/5) normal     Hip Internal Rotation gross movement (4/5) good   pain in L groin    Hip External Rotation gross movement (4-/5) good minus     Knee Flexion strength (5/5) normal     Knee Extension strength (5/5) normal        Additional Manual Muscle Tests    Additional MMT --                   Palpation    Palpation - 10/29/24 1832          Palpation    LE Palpation  TTP R proximal quad and hip adductors with increased tone; jt mobs not tested                   Ortho Special Tests    Orthopedic Special Tests - 10/29/24 1800          Hip/Pelvis Special Tests    [place] Right - Positive     Hip/Pelvis Comments: Other special tests not performed d/t post op status                   Gait and Mobility    Gait and Mobility - 10/29/24 1832          Gait Training    [place], Gait independent     Variable surfaces Flat surface     Distance in Feet 30 feet     Pattern 2-point     Deviations/Abnormal Patterns right sided deviations;antalgic;razia decreased;stride length decreased;step length decreased     Comment R>L in-toeing, decreased knee extension with termal stance on R, early heel rise bilaterally        Gait/Stairs (Locomotion)    Right Sided Gait Deviations Trendelenburg sign;heel strike decreased                   Balance/Posture    Balance and Posture - 10/29/24 1832          Postural Deviations    Postural Deviations low back;pelvis     Low Back lordosis     Pelvis anterior pelvic tilt        Posture    Posture postural deviations                   Outcome Measures    PT Outcome Measures - 10/29/24 1202          Objective Outcome Measures    30 Second Sit to Stand Test 12 repetitions   1/10 pain through groing region on R       Other Outcome Measures Used/Comments    Other outcome measure used: WOMAC: 47%                      Goals         Mutually agreed upon pain goal       Mutually agreed upon pain goal: 0/10         Patient Stated (pt-stated)       Pt will be able to return to resistance training at the gym without pain.         PT R hip Goals       Short Term Goals Time Frame (weeks) Result Comment/Progress   Pt will be I with HEP to promote ability to self manage symptoms 4     Pain at worst rated no greater than 5/10 for improved QOL 4     Joint ROM will improve by 5 deg to promote improved tolerance to ADLs 4     Pt will improve LE strength where limited by 1/2 MMT grade to promote ability to ascend stairs 4     Subjective outcome scores will improve by 9 points 4     Pt will increase 30s STS by 1 rep to indicate improved endurance 4     Pt will perform a functional squat without genu valgus 4         Long Term Goals Time Frame (weeks) Result Comment/Progress   Pt will be I and compliant with updated HEP 8     Pain at worst will decrease to no greater than 1/10 for improved QOL 8     Joint ROM will improve by 15-20 deg to promote improved tolerance to ADLs 8     Pt will improve LE strength where limited by 1 MMT grade to promote improved ability to ascend stairs 8     Subjective outcome scores will improve by 18 points 8     Pt will increase 30s STS to >/= 15 reps to indicate improved endurance 8     Pt will perform single leg functional squats without genu valgus or pain 8                      TREATMENT PLAN:    Diagnosis       R Hip adductor strain s/p tenotomy Sharon Carmen   Precautions:   Eval: 10/29  PN: 11/29  PN: 12/27  Re-eval: 1/21  POC till: 1/21      Outcome Measure:  WOMAC     Total Time for Session Not performed Group CPT 51075   Modalities  CPT HP/CP 93798 Done Today Sets/Reps Comments Time   MHP       CP       Vaso               Total Time for Session Not performed Group CPT 88945   Gait Training  CPT 63641 Done Today Sets/Reps Comments Time                  Total Time for Session Not performed Group CPT 74385   Manual Therapy  CPT 07133 Done Today Sets/Reps Comments Time   STM P  R proximal quads, hip  adductors    Jt mobs               Total Time for Session Not performed Group CPT 56771   Nueromuscular Re-education  CPT 92780 Done Today Sets/Reps Comments Time   SLS       Tandem stance                      Total Time for Session 8-22 Minutes Group CPT 41738   Therapeutic Activity  CPT 21441 Done Today Sets/Reps Comments Time   Pt. Education y  Pt education on examination findings, prognosis, pathology, PT POC, HEP    Update HEP y      PN                      Total Time for Session Not performed Group CPT 06925   Therapeutic Exercise  CPT 52799 Done Today Sets/Reps Comments Time          Recumbent Bike p      Upright bike       Elliptical       Treadmill                     Stretches:        Calf Stretch       Hamstring Stretch p      Prone Quad Stretch Y- HEP 3x30s RLE    Modified tequila p  RLE    Piriformis stretch p  Pull towards opp shoulder  Figure 4    DKTC p                    Strengthening: (table/seated)       S/L Clams y 5''x10     Reverse clam p      SLR p      S/L Hip Add  p      S/L Hip Abd Y- HEP x10     Bridge p      TAC p      TAC with hip add ball squeeze p      Reverse deadbug p      Bridge w/ ADD ball squeeze       Bridge w/ ABD Tband       Bridge w/ march                     Strengthening: (standing)       Mini-squat w/ band p      3 way hip kicks .      Side step p      Monster Walk p      Step ups (Fwd)       Step ups (Lat)       RDL       Heel Tap (Lat)       TKE                      Total Time for Session Not performed Group CPT 72807   Re-evaluation  CPT 19559 Done Today Sets/Reps Comments Time   Re-evaluation                   ASSESSMENT:    This 54 y.o. year old female presents to PT with above stated diagnosis. Physical Therapy evaluation reveals decreased ROM, pain resulting in self-care, community/leisure limitations. Sharon Morales will benefit from skilled PT services to address limitation, work towards rehab and patient goals and maximize PLOF of chosen ADLs.     Planned Services: The  patient's treatment will include CPT 53753 Gait training, CPT 49691 Manual therapy, CPT 58002 Neuromuscular Reeducation, CPT 03601 Therapeutic activities, CPT 09788 Therapeutic exercises, CPT 37874 Electrical stimulation UNATTENDED, CPT 06895 Hot/Cold Packs therapy, .     Jaz Martel, PT

## 2024-10-29 NOTE — LETTER
Dear DR. Brown,    Thank you for this referral. Please review the attached notes and plan of care for your approval.  Please contact our department with any questions.     Sincerely,     Jaz Martel, PT  [address] [address]  Phone [phone]  Fax  [phone]    By co-signing this Plan of Care (POC) you agree to the following:  I have reviewed the the Plan of Care established by the therapist within this document and certify that the services are skilled and medically necessary. I have reviewed the plan and recommend that these services continue to meet the goals stated in this document.    PHYSICIAN SIGNATURE: __________________________________     DATE: ___________________  TIME: _____________           Physical Therapy Plan of Care 10/29/24   Effective from: 10/29/2024  Effective to: 2025    Plan ID: [id number]            Participants as of Finalize on 10/29/2024    Name Type Comments Contact Info    Damion Brown MD Referring Provider  [phone]    Jaz Martel, PT Physical Therapist         Last Progress Notes Note     Author: Jaz Martel, PT Status: Addendum Last edited: 10/29/2024 12:00 PM       Physical Therapy Evaluation    [facility] Fax: [phone]    PT EVALUATION FOR OUTPATIENT THERAPY    Patient: Sharon Morales    MRN: [id number]  : 1970 54 y.o.     Referring Physician: Damion Brown MD  Date of Visit: 10/29/2024      Certification Dates:  10/29/24 through 25         Recommended Frequency & Duration:  2 times/week for up to 8 weeks     Diagnosis:   1. Adductor tendinitis of right hip    2. S/P tendon repair        Chief Complaints: No chief complaint on file.      Precautions:    Precautions additional comments:      Past Medical History:   Past Medical History:   Diagnosis Date   • Arthritis     Knees -sp B/L TKR   • At risk for venous thromboembolism (VTE) 2018   • COVID-19 2022   • Hypothyroidism    • Kidney stones     • Leukopenia 2018   • Motion sickness    • PONV (postoperative nausea and vomiting)    • Seasonal allergies    • Snores        Past Surgical History:   Past Surgical History   Procedure Laterality Date   • Breast lumpectomy Left 2023    s/p benign   • Bunionectomy Bilateral    •  section      x 3   • Colonoscopy  2021    every 5yrs   • Joint replacement Bilateral    • KNEE TOTAL REVISION Left 2018    Performed by Feng Gross MD at  OR   • Nasal sinus surgery  2021   • Revision total knee arthroplasty Right 2018   • Total hip arthroplasty Left 2023         LEARNING ASSESSMENT    Assessment completed:  Yes    Learner name:  Sharon Morales    Learner: Patient    Learning Barriers:  Learning barriers: No Barriers    Preferred Language: English     Needed: No    Education Provided:   Method: Discussion  Readiness: acceptance  Response: Demonstrated understanding      CO-LEARNER ASSESSMENT:    Completed: No      Welcome letter discussed: Yes Patient provided with Welcome Letter, which includes attendance policy. Provided education regarding cancellation and no-show policy. Education regarding the importance of participation and regular attendance to maximize goal attainment.       OBJECTIVE MEASUREMENTS/DATA:    Time In Session:  Start Time: 1202  Stop Time: 1302  Time Calculation (min): 60 min   Assessment and Plan - 10/29/24 1832          Assessment    System Pathology/Pathophysiology Noted musculoskeletal;neuromuscular     Functional Limitations in Following Categories (PT Eval) self-care;community/leisure     Rehab Potential/Prognosis good, to achieve stated therapy goals     Problem List decreased ROM;pain     Clinical Assessment Pt is a 54 yof who presents to PT with R hip pain s/p tenotomy for adductor strain performed on 10/10/2024. Initial muscle strain occurring 2024 after resistance-based exercise. Pt presents with impaired R quad flexibility, decreased R  hip ROM, genu valgus with functional squatting, decreased endurance, pain in R groin with STS transfers, decreased hip and knee strength, gait deviations, and increased hypertonicity of hip adductors and quadricep muscles resulting in functional limitations such as difficulty with bending, lifting, squatting, and stair negotiation. Pt would benefit from skilled PT services to address these deficits and restore maximal pain-free function.     Plan and Recommendations Progress as tolerated towards POC     Planned Services CPT 32628 Gait training;CPT 42546 Manual therapy;CPT 69833 Neuromuscular Reeducation;CPT 28615 Therapeutic activities;CPT 08002 Therapeutic exercises;CPT 97804 Electrical stimulation UNATTENDED;CPT 94140 Hot/Cold Packs therapy                    General Information - 10/29/24 1202          Session Details    Document Type initial evaluation     Mode of Treatment individual therapy        General Information    Referring Physician Damion Brown MD     History of present illness/functional impairment Pt reports R hip pain beginning in July 2024 and is arriving with diagnosis of adductor strain s/p tenotomy performed on 10/10/24. Symptoms described to occur through anterior hip, groin, and upper thigh pain. During ILDEFONSO, pt describes increase in pain the night following resistance-based exercise workout. Two days later she attended a bar class which significantly aggravated pain. Pt visited ortho in August who diagnosed her with a groin tear. She was instructed to rest and only walk on the treadmill or swim to tolerance. Pt followed these instructions, however pain continued to gradually worsen with activity to the point where she could not stand or walk and was admitted to the ED on 9/8/24. Pt returned to ortho who recommended tenotomy occurring about 2.5 weeks ago. Pt required to rest following procedure for 2 weeks with no lifting, only light walking. Pt still having similar pain post procedure  compared to prior level. Pt has difficulty with bending, lifting, squatting, rising from a chair, stair negotiation, getting in/out of bath, and getting in/out of a car. Pt had f/u with ortho “a few days ago” who recommended PT for eval and tx.   Significant PMHx includes: B/L knee replacement when she was 46yo, required revision for L one year later, and R the year after that; L hip replacement about 2 years ago (2022). Pt notes having gait deviations since her R knee replacement years ago which may have contributed to her symptoms.                    Pain/Vitals - 10/29/24 1202          Pain Assessment    Currently in pain Yes     Preferred Pain Scale number (Numeric Rating Pain Scale)     Pain Side/Orientation right     Pain: Body location Hip     Pain Rating (0-10): Pre Activity 4     Pain Level at Best 0     Pain Level at Worst 9     Pain Description intermittent;dull;woke from sleep;aching     Nonverbal Indicators of Pain activity pattern change        Pain Intervention    Intervention  IE     Post Intervention Comments IE                    Falls/Food Screening - 10/29/24 1202          Initial Falls Assessment    One or more falls in the last year No        Food Insecurity    Within the past 12 months, you worried that your food would run out before you got the money to buy more. Never true     Within the past 12 months, the food you bought just didn't last and you didn't have money to get more. Never true                    PT - 10/29/24 1202          Physical Therapy    Physical Therapy [facility]        PT Plan    Frequency of treatment 2 times/week     PT Duration 8 weeks     PT Cert From 10/29/24     PT Cert To 01/21/25     Date PT POC was sent to provider 10/29/24     Signed PT Plan of Care received?  No                       PLOF:    Prior Level of Function - 10/29/24 1832          OTHER    Previous level of function Pt enjoys going on daily walks on treadmill, and swimming; pt would  like to return to resistance training without flaring symptoms                   ROM    Range of Motion - 10/29/24 1200          RIGHT: Lower Extremity AROM Assessment    Hip Flexion   102 degrees     Hip Extension   8 degrees   Discomfort thorugh front of hip    Hip Abduction   45 degrees     Hip Internal Rotation   40 degrees     Hip External Rotation   30 degrees        LEFT: Lower Extremity AROM Assessment    Hip Flexion   118 degrees     Hip Extension   8 degrees     Hip Abduction   42 degrees     Hip Internal Rotation   35 degrees     Hip External Rotation   25 degrees                   MMT    Manual Muscle Tests - 10/29/24 1202          RIGHT: Lower Extremity Manual Muscle Test Assessment    Hip Flexion gross movement (4-/5) good minus     Hip Extension gross movement (4/5) good     Hip Abduction gross movement (4-/5) good minus     Hip Internal Rotation gross movement (4-/5) good minus     Hip External Rotation gross movement (3+/5) fair plus   pain in R groin    Knee Flexion strength (4/5) good     Knee Extension strength (4+/5) good plus        LEFT: Lower Extremity Manual Muscle Test Assessment    Hip Flexion gross movement (4+/5) good plus     Hip Extension gross movement (4+/5) good plus     Hip Abduction gross movement (5/5) normal     Hip Internal Rotation gross movement (4/5) good   pain in L groin    Hip External Rotation gross movement (4-/5) good minus     Knee Flexion strength (5/5) normal     Knee Extension strength (5/5) normal        Additional Manual Muscle Tests    Additional MMT --                   Palpation    Palpation - 10/29/24 1832          Palpation    LE Palpation  TTP R proximal quad and hip adductors with increased tone; jt mobs not tested                   Ortho Special Tests    Orthopedic Special Tests - 10/29/24 1800          Hip/Pelvis Special Tests    [place] Right - Positive     Hip/Pelvis Comments: Other special tests not performed d/t post op status                   Gait  and Mobility    Gait and Mobility - 10/29/24 1832          Gait Training    [place], Gait independent     Variable surfaces Flat surface     Distance in Feet 30 feet     Pattern 2-point     Deviations/Abnormal Patterns right sided deviations;antalgic;razia decreased;stride length decreased;step length decreased     Comment R>L in-toeing, decreased knee extension with termal stance on R, early heel rise bilaterally        Gait/Stairs (Locomotion)    Right Sided Gait Deviations Trendelenburg sign;heel strike decreased                   Balance/Posture    Balance and Posture - 10/29/24 1832          Postural Deviations    Postural Deviations low back;pelvis     Low Back lordosis     Pelvis anterior pelvic tilt        Posture    Posture postural deviations                   Outcome Measures    PT Outcome Measures - 10/29/24 1202          Objective Outcome Measures    30 Second Sit to Stand Test 12 repetitions   1/10 pain through groing region on R       Other Outcome Measures Used/Comments    Other outcome measure used: WOMAC: 47%                      Goals       •  Mutually agreed upon pain goal       Mutually agreed upon pain goal: 0/10      •  Patient Stated (pt-stated)       Pt will be able to return to resistance training at the gym without pain.       •  PT R hip Goals       Short Term Goals Time Frame (weeks) Result Comment/Progress   Pt will be I with HEP to promote ability to self manage symptoms 4     Pain at worst rated no greater than 5/10 for improved QOL 4     Joint ROM will improve by 5 deg to promote improved tolerance to ADLs 4     Pt will improve LE strength where limited by 1/2 MMT grade to promote ability to ascend stairs 4     Subjective outcome scores will improve by 9 points 4     Pt will increase 30s STS by 1 rep to indicate improved endurance 4     Pt will perform a functional squat without genu valgus 4         Long Term Goals Time Frame (weeks) Result Comment/Progress   Pt will be I and  compliant with updated HEP 8     Pain at worst will decrease to no greater than 1/10 for improved QOL 8     Joint ROM will improve by 15-20 deg to promote improved tolerance to ADLs 8     Pt will improve LE strength where limited by 1 MMT grade to promote improved ability to ascend stairs 8     Subjective outcome scores will improve by 18 points 8     Pt will increase 30s STS to >/= 15 reps to indicate improved endurance 8     Pt will perform single leg functional squats without genu valgus or pain 8                      TREATMENT PLAN:    Diagnosis       R Hip adductor strain s/p tenotomy Sharon Carmen   Precautions:   Eval: 10/29  PN: 11/29  PN: 12/27  Re-eval: 1/21  POC till: 1/21      Outcome Measure:  WOMAC     Total Time for Session Not performed Group CPT 76518   Modalities  CPT HP/CP 07445 Done Today Sets/Reps Comments Time   MHP       CP       Vaso               Total Time for Session Not performed Group CPT 04258   Gait Training  CPT 83211 Done Today Sets/Reps Comments Time                  Total Time for Session Not performed Group CPT 34696   Manual Therapy  CPT 31751 Done Today Sets/Reps Comments Time   STM P  R proximal quads, hip adductors    Jt mobs               Total Time for Session Not performed Group CPT 28536   Nueromuscular Re-education  CPT 79663 Done Today Sets/Reps Comments Time   SLS       Tandem stance                      Total Time for Session 8-22 Minutes Group CPT 79125   Therapeutic Activity  CPT 64825 Done Today Sets/Reps Comments Time   Pt. Education y  Pt education on examination findings, prognosis, pathology, PT POC, HEP    Update HEP y      PN                      Total Time for Session Not performed Group CPT 43015   Therapeutic Exercise  CPT 33049 Done Today Sets/Reps Comments Time          Recumbent Bike p      Upright bike       Elliptical       Treadmill                     Stretches:        Calf Stretch       Hamstring Stretch p      Prone Quad Stretch Y- HEP 3x30s RLE     Modified tequila p  RLE    Piriformis stretch p  Pull towards opp shoulder  Figure 4    DKTC p                    Strengthening: (table/seated)       S/L Clams y 5''x10     Reverse clam p      SLR p      S/L Hip Add  p      S/L Hip Abd Y- HEP x10     Bridge p      TAC p      TAC with hip add ball squeeze p      Reverse deadbug p      Bridge w/ ADD ball squeeze       Bridge w/ ABD Tband       Bridge w/ march                     Strengthening: (standing)       Mini-squat w/ band p      3 way hip kicks .      Side step p      Monster Walk p      Step ups (Fwd)       Step ups (Lat)       RDL       Heel Tap (Lat)       TKE                      Total Time for Session Not performed Group CPT 23958   Re-evaluation  CPT 17979 Done Today Sets/Reps Comments Time   Re-evaluation                   ASSESSMENT:    This 54 y.o. year old female presents to PT with above stated diagnosis. Physical Therapy evaluation reveals decreased ROM, pain resulting in self-care, community/leisure limitations. Sharon Morales will benefit from skilled PT services to address limitation, work towards rehab and patient goals and maximize PLOF of chosen ADLs.     Planned Services: The patient's treatment will include CPT 18970 Gait training, CPT 15801 Manual therapy, CPT 77394 Neuromuscular Reeducation, CPT 32455 Therapeutic activities, CPT 31981 Therapeutic exercises, CPT 85332 Electrical stimulation UNATTENDED, CPT 14913 Hot/Cold Packs therapy, .     Jaz Martel PT                           Current Participants as of 10/29/2024    Name Type Comments Contact Info    Damion Brown MD Referring Provider  [phone]    Signature pending    Jaz Martel PT Physical Therapist      Signature pending

## 2024-10-29 NOTE — OP PT TREATMENT LOG
Diagnosis       R Hip adductor strain s/p tenotomy Sharon Carmen   Precautions:   Eval: 10/29  PN: 11/29  PN: 12/27  Re-eval: 1/21  POC till: 1/21      Outcome Measure:  WOMAC     Total Time for Session Not performed Group CPT 24458   Modalities  CPT HP/CP 98172 Done Today Sets/Reps Comments Time   MHP       CP       Vaso               Total Time for Session Not performed Group CPT 92353   Gait Training  CPT 72092 Done Today Sets/Reps Comments Time                  Total Time for Session Not performed Group CPT 51087   Manual Therapy  CPT 46148 Done Today Sets/Reps Comments Time   STM P  R proximal quads, hip adductors    Jt mobs               Total Time for Session Not performed Group CPT 17519   Nueromuscular Re-education  CPT 23444 Done Today Sets/Reps Comments Time   SLS       Tandem stance                      Total Time for Session 8-22 Minutes Group CPT 13028   Therapeutic Activity  CPT 84411 Done Today Sets/Reps Comments Time   Pt. Education y  Pt education on examination findings, prognosis, pathology, PT POC, HEP    Update HEP y      PN                      Total Time for Session Not performed Group CPT 52632   Therapeutic Exercise  CPT 01596 Done Today Sets/Reps Comments Time          Recumbent Bike p      Upright bike       Elliptical       Treadmill                     Stretches:        Calf Stretch       Hamstring Stretch p      Prone Quad Stretch Y- HEP 3x30s RLE    Modified tequila p  RLE    Piriformis stretch p  Pull towards opp shoulder  Figure 4    DKTC p                    Strengthening: (table/seated)       S/L Clams y 5''x10     Reverse clam p      SLR p      S/L Hip Add  p      S/L Hip Abd Y- HEP x10     Bridge p      TAC p      TAC with hip add ball squeeze p      Reverse deadbug p      Bridge w/ ADD ball squeeze       Bridge w/ ABD Tband       Bridge w/ march                     Strengthening: (standing)       Mini-squat w/ band p      3 way hip kicks .      Side step p      Monster Walk p       Step ups (Fwd)       Step ups (Lat)       RDL       Heel Tap (Lat)       TKE                      Total Time for Session Not performed Group CPT 12692   Re-evaluation  CPT 55656 Done Today Sets/Reps Comments Time   Re-evaluation

## 2024-11-01 ENCOUNTER — HOSPITAL ENCOUNTER (OUTPATIENT)
Dept: PHYSICAL THERAPY | Age: 54
Setting detail: THERAPIES SERIES
Discharge: HOME | End: 2024-11-01
Attending: FAMILY MEDICINE
Payer: COMMERCIAL

## 2024-11-01 DIAGNOSIS — M76.891 ADDUCTOR TENDINITIS OF RIGHT HIP: Primary | ICD-10-CM

## 2024-11-01 DIAGNOSIS — Z98.890 S/P TENDON REPAIR: ICD-10-CM

## 2024-11-01 PROCEDURE — 97110 THERAPEUTIC EXERCISES: CPT | Mod: GP,CQ

## 2024-11-01 NOTE — PROGRESS NOTES
Physical Therapy Visit    PT DAILY NOTE FOR OUTPATIENT THERAPY    Patient: Sharon Morales MRN: [id number]  : 1970 54 y.o.  Referring Physician: Damion Brown MD  Date of Visit: 2024    Certification Dates: 10/29/24 through 25    Diagnosis:   1. Adductor tendinitis of right hip    2. S/P tendon repair        Chief Complaints:  adductor pain, s/p tenotomy    Precautions:          TODAY'S VISIT    Time In Session:  Start Time: 0700  Stop Time: 0758  Time Calculation (min): 58 min   History/Vitals/Pain/Encounter Info - 24 0658          Injury History/Precautions/Daily Required Info    Document Type daily treatment     Primary Therapist Jaz Martel     Chief Complaint/Reason for Visit  adductor pain, s/p tenotomy     Referring Physician Damion Brown MD     History of present illness/functional impairment Pt reports R hip pain beginning in 2024 and is arriving with diagnosis of adductor strain s/p tenotomy performed on 10/10/24. Symptoms described to occur through anterior hip, groin, and upper thigh pain. During ILDEFONSO, pt describes increase in pain the night following resistance-based exercise workout. Two days later she attended a bar class which significantly aggravated pain. Pt visited ortho in August who diagnosed her with a groin tear. She was instructed to rest and only walk on the treadmill or swim to tolerance. Pt followed these instructions, however pain continued to gradually worsen with activity to the point where she could not stand or walk and was admitted to the ED on 24. Pt returned to ortho who recommended tenotomy occurring about 2.5 weeks ago. Pt required to rest following procedure for 2 weeks with no lifting, only light walking. Pt still having similar pain post procedure compared to prior level. Pt has difficulty with bending, lifting, squatting, rising from a chair, stair negotiation, getting in/out of bath, and getting in/out of a car. Pt had f/u with ortho  "“a few days ago” who recommended PT for eval and tx.   Significant PMHx includes: B/L knee replacement when she was 46yo, required revision for L one year later, and R the year after that; L hip replacement about 2 years ago (2022). Pt notes having gait deviations since her R knee replacement years ago which may have contributed to her symptoms.     Patient/Family/Caregiver Comments/Observations Ay notes some mild soreness after her initial eval, but \"in a good way\". She had no questions about her HEP.     Patient reported fall since last visit No        Pain Assessment    Currently in pain No/Denies     Pain: Body location Hip     Pain Rating (0-10): Pre Activity 0     Pain Level at Worst 5                    Daily Treatment Assessment and Plan - 11/01/24 0658          Daily Treatment Assessment and Plan    Progress toward goals Progressing     Daily Outcome Summary Session focused on initiating POC with focus on adductor lengthening stretches and hip/core stability exercises. Sharon is an active individual with a good understanding of PT from prior enrollments. Progress as appropriate.     Plan and Recommendations Progress as tolerated towards POC                         OBJECTIVE DATA TAKEN TODAY:    Today's Treatment:    Diagnosis       R Hip adductor strain s/p tenotomy Sharon Carmen   Precautions:   Eval: 10/29  PN: 11/29  PN: 12/27  Re-eval: 1/21  POC till: 1/21      Outcome Measure:  WOMAC     Total Time for Session Not performed Group CPT 33306   Modalities  CPT HP/CP 36160 Done Today Sets/Reps Comments Time   MHP       CP       Vaso               Total Time for Session Not performed Group CPT 24689   Gait Training  CPT 48311 Done Today Sets/Reps Comments Time                  Total Time for Session Not performed Group CPT 28664   Manual Therapy  CPT 70185 Done Today Sets/Reps Comments Time   STM P  R proximal quads, hip adductors    Jt mobs               Total Time for Session Not performed Group CPT 87650 "   Nueromuscular Re-education  CPT 73632 Done Today Sets/Reps Comments Time   SLS       Tandem stance                      Total Time for Session Not performed Group CPT 44466   Therapeutic Activity  CPT 76054 Done Today Sets/Reps Comments Time   Pt. Education y  Pt education on examination findings, prognosis, pathology, PT POC, HEP    Update HEP y      PN                      Total Time for Session 53-67 Minutes Group CPT 71666   Therapeutic Exercise  CPT 97583 Done Today Sets/Reps Comments Time          Recumbent Bike       Upright bike       Elliptical       Treadmill y 10:00                   Stretches:        Calf Stretch y :60 Has slantboard at home    Adductor stretch y :30x3 SOS    Prone Quad Stretch Y- HEP 3x30s RLE    Modified tequila y 3x:30 RLE    Piriformis stretch y 2x:30 Pull towards opp shoulder  Figure 4    DKTC y :10x10 Red ball    Standing side lunge y :10x10            Strengthening: (table/seated)       S/L Clams y 5''x10     Reverse clam p      SLR p      S/L Hip Add  p      S/L Hip Abd Y- HEP x10     Bridge y 2x10     TAC p      TAC with hip add ball squeeze p      Reverse deadbug p      Bridge w/ ADD ball squeeze       Bridge w/ ABD Tband       Bridge w/ march                     Strengthening: (standing)       Mini-squat w/ band p      3 way hip kicks .      Side step y X2 laps Orange loop    Monster Walk y X2 laps Orange loop    Step ups (Fwd)       Step ups (Lat)       RDL       Heel Tap (Lat)       TKE                      Total Time for Session Not performed Group CPT 24297   Re-evaluation  CPT 32506 Done Today Sets/Reps Comments Time   Re-evaluation

## 2024-11-01 NOTE — OP PT TREATMENT LOG
Diagnosis       R Hip adductor strain s/p tenotomy Sharon Carmen   Precautions:   Eval: 10/29  PN: 11/29  PN: 12/27  Re-eval: 1/21  POC till: 1/21      Outcome Measure:  WOMAC     Total Time for Session Not performed Group CPT 80842   Modalities  CPT HP/CP 31942 Done Today Sets/Reps Comments Time   MHP       CP       Vaso               Total Time for Session Not performed Group CPT 93061   Gait Training  CPT 46504 Done Today Sets/Reps Comments Time                  Total Time for Session Not performed Group CPT 01830   Manual Therapy  CPT 88886 Done Today Sets/Reps Comments Time   STM P  R proximal quads, hip adductors    Jt mobs               Total Time for Session Not performed Group CPT 50831   Nueromuscular Re-education  CPT 90428 Done Today Sets/Reps Comments Time   SLS       Tandem stance                      Total Time for Session Not performed Group CPT 14189   Therapeutic Activity  CPT 81210 Done Today Sets/Reps Comments Time   Pt. Education y  Pt education on examination findings, prognosis, pathology, PT POC, HEP    Update HEP y      PN                      Total Time for Session 53-67 Minutes Group CPT 30482   Therapeutic Exercise  CPT 77455 Done Today Sets/Reps Comments Time          Recumbent Bike       Upright bike       Elliptical       Treadmill y 10:00                   Stretches:        Calf Stretch y :60 Has slantboard at home    Adductor stretch y :30x3 SOS    Prone Quad Stretch Y- HEP 3x30s RLE    Modified tequila y 3x:30 RLE    Piriformis stretch y 2x:30 Pull towards opp shoulder  Figure 4    DKTC y :10x10 Red ball    Standing side lunge y :10x10            Strengthening: (table/seated)       S/L Clams y 5''x10     Reverse clam p      SLR p      S/L Hip Add  p      S/L Hip Abd Y- HEP x10     Bridge y 2x10     TAC p      TAC with hip add ball squeeze p      Reverse deadbug p      Bridge w/ ADD ball squeeze       Bridge w/ ABD Tband       Bridge w/ march                     Strengthening: (standing)        Mini-squat w/ band p      3 way hip kicks .      Side step y X2 laps Orange loop    Monster Walk y X2 laps Orange loop    Step ups (Fwd)       Step ups (Lat)       RDL       Heel Tap (Lat)       TKE                      Total Time for Session Not performed Group CPT 19245   Re-evaluation  CPT 22974 Done Today Sets/Reps Comments Time   Re-evaluation

## 2024-11-06 ENCOUNTER — HOSPITAL ENCOUNTER (OUTPATIENT)
Dept: PHYSICAL THERAPY | Age: 54
Setting detail: THERAPIES SERIES
Discharge: HOME | End: 2024-11-06
Attending: FAMILY MEDICINE
Payer: COMMERCIAL

## 2024-11-06 DIAGNOSIS — M76.891 ADDUCTOR TENDINITIS OF RIGHT HIP: Primary | ICD-10-CM

## 2024-11-06 DIAGNOSIS — Z98.890 S/P TENDON REPAIR: ICD-10-CM

## 2024-11-06 PROCEDURE — 97110 THERAPEUTIC EXERCISES: CPT | Mod: GP

## 2024-11-06 NOTE — PROGRESS NOTES
Physical Therapy Visit    PT DAILY NOTE FOR OUTPATIENT THERAPY    Patient: Sharon Morales MRN: [id number]  : 1970 54 y.o.  Referring Physician: Damion Brown MD  Date of Visit: 2024    Certification Dates: 10/29/24 through 25    Diagnosis:   1. Adductor tendinitis of right hip    2. S/P tendon repair        Chief Complaints:  adductor pain, s/p tenotomy    Precautions:          TODAY'S VISIT    Time In Session:  Start Time: 1200  Stop Time: 1258  Time Calculation (min): 58 min   History/Vitals/Pain/Encounter Info - 24 1154          Injury History/Precautions/Daily Required Info    Document Type daily treatment     Primary Therapist Jaz Martel     Chief Complaint/Reason for Visit  adductor pain, s/p tenotomy     Referring Physician Damion Brown MD     History of present illness/functional impairment Pt reports R hip pain beginning in 2024 and is arriving with diagnosis of adductor strain s/p tenotomy performed on 10/10/24. Symptoms described to occur through anterior hip, groin, and upper thigh pain. During ILDEFONSO, pt describes increase in pain the night following resistance-based exercise workout. Two days later she attended a bar class which significantly aggravated pain. Pt visited ortho in August who diagnosed her with a groin tear. She was instructed to rest and only walk on the treadmill or swim to tolerance. Pt followed these instructions, however pain continued to gradually worsen with activity to the point where she could not stand or walk and was admitted to the ED on 24. Pt returned to ortho who recommended tenotomy occurring about 2.5 weeks ago. Pt required to rest following procedure for 2 weeks with no lifting, only light walking. Pt still having similar pain post procedure compared to prior level. Pt has difficulty with bending, lifting, squatting, rising from a chair, stair negotiation, getting in/out of bath, and getting in/out of a car. Pt had f/u with ortho  “a few days ago” who recommended PT for eval and tx.   Significant PMHx includes: B/L knee replacement when she was 44yo, required revision for L one year later, and R the year after that; L hip replacement about 2 years ago (2022). Pt notes having gait deviations since her R knee replacement years ago which may have contributed to her symptoms.     Patient/Family/Caregiver Comments/Observations Pt reports feeling sore in her hip abductors after last session, but she already feels she is making progress with less pain in her anterior R hip and thigh.     Patient reported fall since last visit No        Pain Assessment    Currently in pain No/Denies                    Daily Treatment Assessment and Plan - 11/06/24 1154          Daily Treatment Assessment and Plan    Progress toward goals Progressing     Daily Outcome Summary Reviewed newly added exercises from previous visit, with bridge progressions and increased resistance for clamshells. Introduced pt to reverse deadbug, shoulder extension with marching, and wall squats with no adverse effects. Pt develops quick fatigue with monster walks and side steps. Cues required for all exercises to ensure correct muscle activation and sequencing of activity.     Plan and Recommendations Progress as tolerated towards POC                         OBJECTIVE DATA TAKEN TODAY:    None taken    Today's Treatment:    Diagnosis       R Hip adductor strain s/p tenotomy Sharon Carmen   Precautions:   Eval: 10/29  PN: 11/29  PN: 12/27  Re-eval: 1/21  POC till: 1/21      Outcome Measure:  WOMAC     Total Time for Session Not performed Group CPT 83745   Modalities  CPT HP/CP 30602 Done Today Sets/Reps Comments Time   MHP       CP       Vaso               Total Time for Session Not performed Group CPT 53962   Gait Training  CPT 90667 Done Today Sets/Reps Comments Time                  Total Time for Session Not performed Group CPT 69231   Manual Therapy  CPT 83924 Done Today Sets/Reps  Comments Time   STM P  R proximal quads, hip adductors    Jt mobs               Total Time for Session Not performed Group CPT 42794   Nueromuscular Re-education  CPT 53767 Done Today Sets/Reps Comments Time   SLS       Tandem stance                      Total Time for Session Not performed Group CPT 76839   Therapeutic Activity  CPT 49930 Done Today Sets/Reps Comments Time   Pt. Education y  Pt education on examination findings, prognosis, pathology, PT POC, HEP    Update HEP y      PN                      Total Time for Session 53-67 Minutes Group CPT 12555   Therapeutic Exercise  CPT 34768 Done Today Sets/Reps Comments Time          Recumbent Bike       Upright bike       Elliptical       Treadmill y 10:00 2.0-2.3 mph                  Stretches:        Calf Stretch y :60 Has slantboard at home  Lvl 5    Adductor stretch nv :30x3 SOS    Prone Quad Stretch HEP 3x30s RLE    Modified tequila y 3x:30 RLE    Piriformis stretch y 2x:30 Pull towards opp shoulder    DKTC y :5x10 Red ball    Standing side lunge y :30x2 Standing at // bars           Strengthening: (table/seated)       S/L Clams y 5'' 2x10 RTB    Reverse clam p      SLR p      S/L Hip Add  p      S/L Hip Abd HEP x10     Bridge n 2x10     Bridge with Hip add y 5''x10     Bridge with TB hip adb y 5''x10 RTB    TAC y 5''x5     Reverse deadbug y 5''x10     Bridge w/ march p                    Strengthening: (standing)       Mini-squat w/ band y x20 RTB around knees    4 way hip kicks p      Side step y X2 laps Red loop    Monster Walk y X2 laps Red  loop    Pallof press y x10ea RTB  PLAN: Green band nv    Shoulder ext with march y x10ea RTB    Step ups (Fwd)       Step ups (Lat)       RDL       Heel Tap (Lat)       TKE                      Total Time for Session Not performed Group CPT 81629   Re-evaluation  CPT 53425 Done Today Sets/Reps Comments Time   Re-evaluation

## 2024-11-06 NOTE — OP PT TREATMENT LOG
Diagnosis       R Hip adductor strain s/p tenotomy Sharon Carmen   Precautions:   Eval: 10/29  PN: 11/29  PN: 12/27  Re-eval: 1/21  POC till: 1/21      Outcome Measure:  WOMAC     Total Time for Session Not performed Group CPT 16665   Modalities  CPT HP/CP 38708 Done Today Sets/Reps Comments Time   MHP       CP       Vaso               Total Time for Session Not performed Group CPT 04610   Gait Training  CPT 55844 Done Today Sets/Reps Comments Time                  Total Time for Session Not performed Group CPT 99628   Manual Therapy  CPT 35254 Done Today Sets/Reps Comments Time   STM P  R proximal quads, hip adductors    Jt mobs               Total Time for Session Not performed Group CPT 04743   Nueromuscular Re-education  CPT 61051 Done Today Sets/Reps Comments Time   SLS       Tandem stance                      Total Time for Session Not performed Group CPT 93946   Therapeutic Activity  CPT 72500 Done Today Sets/Reps Comments Time   Pt. Education y  Pt education on examination findings, prognosis, pathology, PT POC, HEP    Update HEP y      PN                      Total Time for Session 53-67 Minutes Group CPT 02549   Therapeutic Exercise  CPT 65971 Done Today Sets/Reps Comments Time          Recumbent Bike       Upright bike       Elliptical       Treadmill y 10:00 2.0-2.3 mph                  Stretches:        Calf Stretch y :60 Has slantboard at home  Lvl 5    Adductor stretch nv :30x3 SOS    Prone Quad Stretch HEP 3x30s RLE    Modified tequila y 3x:30 RLE    Piriformis stretch y 2x:30 Pull towards opp shoulder    DKTC y :5x10 Red ball    Standing side lunge y :30x2 Standing at // bars           Strengthening: (table/seated)       S/L Clams y 5'' 2x10 RTB    Reverse clam p      SLR p      S/L Hip Add  p      S/L Hip Abd HEP x10     Bridge n 2x10     Bridge with Hip add y 5''x10     Bridge with TB hip adb y 5''x10 RTB    TAC y 5''x5     Reverse deadbug y 5''x10     Bridge w/ march p                     Strengthening: (standing)       Mini-squat w/ band y x20 RTB around knees    4 way hip kicks p      Side step y X2 laps Red loop    Monster Walk y X2 laps Red  loop    Pallof press y x10ea RTB  PLAN: Green band nv    Shoulder ext with march y x10ea RTB    Step ups (Fwd)       Step ups (Lat)       RDL       Heel Tap (Lat)       TKE                      Total Time for Session Not performed Group CPT 29277   Re-evaluation  CPT 98587 Done Today Sets/Reps Comments Time   Re-evaluation

## 2024-11-08 ENCOUNTER — HOSPITAL ENCOUNTER (OUTPATIENT)
Dept: PHYSICAL THERAPY | Age: 54
Setting detail: THERAPIES SERIES
Discharge: HOME | End: 2024-11-08
Attending: FAMILY MEDICINE
Payer: COMMERCIAL

## 2024-11-08 DIAGNOSIS — M76.891 ADDUCTOR TENDINITIS OF RIGHT HIP: Primary | ICD-10-CM

## 2024-11-08 DIAGNOSIS — Z98.890 S/P TENDON REPAIR: ICD-10-CM

## 2024-11-08 PROCEDURE — 97140 MANUAL THERAPY 1/> REGIONS: CPT | Mod: GP

## 2024-11-08 PROCEDURE — 97110 THERAPEUTIC EXERCISES: CPT | Mod: GP

## 2024-11-08 NOTE — OP PT TREATMENT LOG
Diagnosis       R Hip adductor strain s/p tenotomy Sharon Carmen   Precautions:   Eval: 10/29  PN: 11/29  PN: 12/27  Re-eval: 1/21  POC till: 1/21      Outcome Measure:  WOMAC     Total Time for Session Not performed Group CPT 88666   Modalities  CPT HP/CP 23712 Done Today Sets/Reps Comments Time   MHP       CP       Vaso               Total Time for Session Not performed Group CPT 83127   Gait Training  CPT 48939 Done Today Sets/Reps Comments Time                  Total Time for Session 8-22 Minutes Group CPT 14744   Manual Therapy  CPT 47657 Done Today Sets/Reps Comments Time   STM y  R proximal quads, hip adductors    IASTM y  Hand held roller to proximal quads    Jt mobs               Total Time for Session Not performed Group CPT 22602   Nueromuscular Re-education  CPT 96926 Done Today Sets/Reps Comments Time   SLS       Tandem stance                      Total Time for Session Not performed Group CPT 34409   Therapeutic Activity  CPT 05267 Done Today Sets/Reps Comments Time   Pt. Education y  Pt education on examination findings, prognosis, pathology, PT POC, HEP    Update HEP y      PN                      Total Time for Session 38-52 Minutes Group CPT 47611   Therapeutic Exercise  CPT 37093 Done Today Sets/Reps Comments Time          Recumbent Bike       Upright bike       Elliptical       Treadmill y 10:00 2.2-2.4 mph                  Stretches:        Calf Stretch y :60 Has slantboard at home  Lvl 5    Adductor stretch nv :30x3 SOS    Prone Quad Stretch HEP 3x30s RLE    Modified tequila y 3x:30 RLE; with SOS    Piriformis stretch y 2x:30 Pull towards opp shoulder and away    DKTC y :5x10 Red ball    Standing side lunge y :30x2 Standing at // bars           Strengthening: (table/seated)       S/L Clams y 5'' 2x10 RTB    Reverse clam y 5'' 2x10 YTB    SLR with TAC       S/L Hip Add        S/L Hip Abd HEP x10     Bridge n 2x10     Bridge with Hip add y 5''x10     Bridge with TB hip adb y 5''x10 RTB    TAC n 5''x5      Reverse deadbug n 5''x10     Modified deadbug y x10ea Start position: hooklying    Bridge w/ march p                    Strengthening: (standing)       Mini-squat w/ band y x20 RTB around knees    4 way hip kicks p      Side step y X2 laps Red loop    Monster Walk y X2 laps Red  loop    Pallof press nv x10ea RTB  PLAN: Green band nv    Shoulder ext with march y x10ea RTB    Step ups (Fwd)       Step ups (Lat) y 2x10 8'' step    RDL       Heel Tap (Lat)       TKE                      Total Time for Session Not performed Group CPT 93451   Re-evaluation  CPT 92234 Done Today Sets/Reps Comments Time   Re-evaluation

## 2024-11-08 NOTE — PROGRESS NOTES
Physical Therapy Visit    PT DAILY NOTE FOR OUTPATIENT THERAPY    Patient: Sharon Morales MRN: [id number]  : 1970 54 y.o.  Referring Physician: Damion Brown MD  Date of Visit: 2024    Certification Dates: 10/29/24 through 25    Diagnosis:   1. Adductor tendinitis of right hip    2. S/P tendon repair        Chief Complaints:  adductor pain, s/p tenotomy    Precautions:          TODAY'S VISIT    Time In Session:  Start Time: 075  Stop Time: 08  Time Calculation (min): 59 min   History/Vitals/Pain/Encounter Info - 24 0754          Injury History/Precautions/Daily Required Info    Document Type daily treatment     Primary Therapist Jaz Martel     Chief Complaint/Reason for Visit  adductor pain, s/p tenotomy     Referring Physician Damion Brown MD     History of present illness/functional impairment Pt reports R hip pain beginning in 2024 and is arriving with diagnosis of adductor strain s/p tenotomy performed on 10/10/24. Symptoms described to occur through anterior hip, groin, and upper thigh pain. During ILDEFONSO, pt describes increase in pain the night following resistance-based exercise workout. Two days later she attended a bar class which significantly aggravated pain. Pt visited ortho in August who diagnosed her with a groin tear. She was instructed to rest and only walk on the treadmill or swim to tolerance. Pt followed these instructions, however pain continued to gradually worsen with activity to the point where she could not stand or walk and was admitted to the ED on 24. Pt returned to ortho who recommended tenotomy occurring about 2.5 weeks ago. Pt required to rest following procedure for 2 weeks with no lifting, only light walking. Pt still having similar pain post procedure compared to prior level. Pt has difficulty with bending, lifting, squatting, rising from a chair, stair negotiation, getting in/out of bath, and getting in/out of a car. Pt had f/u with ortho  “a few days ago” who recommended PT for eval and tx.   Significant PMHx includes: B/L knee replacement when she was 44yo, required revision for L one year later, and R the year after that; L hip replacement about 2 years ago (2022). Pt notes having gait deviations since her R knee replacement years ago which may have contributed to her symptoms.     Patient/Family/Caregiver Comments/Observations Pt reports feeling increased pain/soreness yesterday which may have been aggravated from lifting laundry. Pt denies soreness or pain after last session, and no pain when arriving to PT.     Patient reported fall since last visit No        Pain Assessment    Currently in pain No/Denies                    Daily Treatment Assessment and Plan - 11/08/24 0754          Daily Treatment Assessment and Plan    Progress toward goals Progressing     Daily Outcome Summary Began session with active warm-up on treadmill self paced to reach max speed of 2.4mph. Followed with passive stretching and added manual therapy to promote blood flow for tissue healing. Pt has greatest tone and TTP along proximal quad and hip adductors. Added reverse clams and modified deadbug with no ecacerbation of symptoms. Cues required for deadbug to ensure proper TAC with limb movement.     Plan and Recommendations Progress as tolerated towards POC                         OBJECTIVE DATA TAKEN TODAY:    None taken    Today's Treatment:    Diagnosis       R Hip adductor strain s/p tenotomy Sharon Carmen   Precautions:   Eval: 10/29  PN: 11/29  PN: 12/27  Re-eval: 1/21  POC till: 1/21      Outcome Measure:  WOMAC     Total Time for Session Not performed Group CPT 37479   Modalities  CPT HP/CP 92919 Done Today Sets/Reps Comments Time   MHP       CP       Vaso               Total Time for Session Not performed Group CPT 38392   Gait Training  CPT 58444 Done Today Sets/Reps Comments Time                  Total Time for Session 8-22 Minutes Group CPT 34677   Manual  Therapy  CPT 67673 Done Today Sets/Reps Comments Time   STM y  R proximal quads, hip adductors    IASTM y  Hand held roller to proximal quads    Jt mobs               Total Time for Session Not performed Group CPT 19996   Nueromuscular Re-education  CPT 29619 Done Today Sets/Reps Comments Time   SLS       Tandem stance                      Total Time for Session Not performed Group CPT 80863   Therapeutic Activity  CPT 45698 Done Today Sets/Reps Comments Time   Pt. Education y  Pt education on examination findings, prognosis, pathology, PT POC, HEP    Update HEP y      PN                      Total Time for Session 38-52 Minutes Group CPT 90292   Therapeutic Exercise  CPT 95849 Done Today Sets/Reps Comments Time          Recumbent Bike       Upright bike       Elliptical       Treadmill y 10:00 2.2-2.4 mph                  Stretches:        Calf Stretch y :60 Has slantboard at home  Lvl 5    Adductor stretch nv :30x3 SOS    Prone Quad Stretch HEP 3x30s RLE    Modified tequila y 3x:30 RLE; with SOS    Piriformis stretch y 2x:30 Pull towards opp shoulder and away    DKTC y :5x10 Red ball    Standing side lunge y :30x2 Standing at // bars           Strengthening: (table/seated)       S/L Clams y 5'' 2x10 RTB    Reverse clam y 5'' 2x10 YTB    SLR with TAC       S/L Hip Add        S/L Hip Abd HEP x10     Bridge n 2x10     Bridge with Hip add y 5''x10     Bridge with TB hip adb y 5''x10 RTB    TAC n 5''x5     Reverse deadbug n 5''x10     Modified deadbug y x10ea Start position: hooklying    Bridge w/ march p                    Strengthening: (standing)       Mini-squat w/ band y x20 RTB around knees    4 way hip kicks p      Side step y X2 laps Red loop    Monster Walk y X2 laps Red  loop    Pallof press nv x10ea RTB  PLAN: Green band nv    Shoulder ext with march y x10ea RTB    Step ups (Fwd)       Step ups (Lat) y 2x10 8'' step    RDL       Heel Tap (Lat)       TKE                      Total Time for Session Not  performed Group CPT 29920   Re-evaluation  CPT 77634 Done Today Sets/Reps Comments Time   Re-evaluation

## 2024-11-11 ENCOUNTER — HOSPITAL ENCOUNTER (OUTPATIENT)
Dept: PHYSICAL THERAPY | Age: 54
Setting detail: THERAPIES SERIES
Discharge: HOME | End: 2024-11-11
Attending: FAMILY MEDICINE
Payer: COMMERCIAL

## 2024-11-11 DIAGNOSIS — Z98.890 S/P TENDON REPAIR: ICD-10-CM

## 2024-11-11 DIAGNOSIS — M76.891 ADDUCTOR TENDINITIS OF RIGHT HIP: Primary | ICD-10-CM

## 2024-11-11 PROCEDURE — 97140 MANUAL THERAPY 1/> REGIONS: CPT | Mod: GP,CQ

## 2024-11-11 PROCEDURE — 97110 THERAPEUTIC EXERCISES: CPT | Mod: GP,CQ

## 2024-11-11 NOTE — PROGRESS NOTES
Physical Therapy Visit    PT DAILY NOTE FOR OUTPATIENT THERAPY    Patient: Sharon Morales MRN: [id number]  : 1970 54 y.o.  Referring Physician: Damion Brown MD  Date of Visit: 2024    Certification Dates: 10/29/24 through 25    Diagnosis:   1. Adductor tendinitis of right hip    2. S/P tendon repair        Chief Complaints:  adductor pain, s/p tenotomy    Precautions:          TODAY'S VISIT    Time In Session:  Start Time: 1401  Stop Time: 1502  Time Calculation (min): 61 min   History/Vitals/Pain/Encounter Info - 24 1403          Injury History/Precautions/Daily Required Info    Document Type daily treatment     Primary Therapist Jaz Martel     Chief Complaint/Reason for Visit  adductor pain, s/p tenotomy     Referring Physician Damion Brown MD     History of present illness/functional impairment Pt reports R hip pain beginning in 2024 and is arriving with diagnosis of adductor strain s/p tenotomy performed on 10/10/24. Symptoms described to occur through anterior hip, groin, and upper thigh pain. During ILDEFONSO, pt describes increase in pain the night following resistance-based exercise workout. Two days later she attended a bar class which significantly aggravated pain. Pt visited ortho in August who diagnosed her with a groin tear. She was instructed to rest and only walk on the treadmill or swim to tolerance. Pt followed these instructions, however pain continued to gradually worsen with activity to the point where she could not stand or walk and was admitted to the ED on 24. Pt returned to ortho who recommended tenotomy occurring about 2.5 weeks ago. Pt required to rest following procedure for 2 weeks with no lifting, only light walking. Pt still having similar pain post procedure compared to prior level. Pt has difficulty with bending, lifting, squatting, rising from a chair, stair negotiation, getting in/out of bath, and getting in/out of a car. Pt had f/u with  "ortho “a few days ago” who recommended PT for eval and tx.   Significant PMHx includes: B/L knee replacement when she was 46yo, required revision for L one year later, and R the year after that; L hip replacement about 2 years ago (2022). Pt notes having gait deviations since her R knee replacement years ago which may have contributed to her symptoms.     Patient/Family/Caregiver Comments/Observations Pt. reports R hip pain in 1-2/10 today.     Patient reported fall since last visit No        Pain Assessment    Currently in pain Yes     Preferred Pain Scale number (Numeric Rating Pain Scale)     Pain Side/Orientation right     Pain: Body location Hip     Pain Rating (0-10): Pre Activity 2     Pain Rating (0-10): Post Activity 1        Pain Intervention    Intervention  TE, MT     Post Intervention Comments \"better\"                    Daily Treatment Assessment and Plan - 11/11/24 1608          Daily Treatment Assessment and Plan    Progress toward goals Progressing     Daily Outcome Summary Pt. reports hip pain is mild today 1-2/10. Started session with walking on TM x 10 min for warm up and MT with muscle roller to B quads x 8 min and review of stretches and LE strength TEas noted on log. LAD to R LE at end of session 5 x 20 sec. Pt. reports R hip feels \"better\" after LAD.     Plan and Recommendations Progress as tolerated towards POC                         OBJECTIVE DATA TAKEN TODAY:    None taken    Today's Treatment:    Diagnosis       R Hip adductor strain s/p tenotomy Sharon Carmen   Precautions:   Eval: 10/29  PN: 11/29  PN: 12/27  Re-eval: 1/21  POC till: 1/21      Outcome Measure:  WOMAC     Total Time for Session Not performed Group CPT 26830   Modalities  CPT HP/CP 13701 Done Today Sets/Reps Comments Time   MHP       CP       Vaso               Total Time for Session Not performed Group CPT 63584   Gait Training  CPT 58285 Done Today Sets/Reps Comments Time                  Total Time for Session 8-22 " Minutes Group CPT 49733   Manual Therapy  CPT 83952 Done Today Sets/Reps Comments Time   STM   R proximal quads, hip adductors    IASTM y  Hand held roller to proximal quads    Jt mobs       LAD y       Total Time for Session Not performed Group CPT 84545   Nueromuscular Re-education  CPT 12464 Done Today Sets/Reps Comments Time   SLS       Tandem stance                      Total Time for Session Not performed Group CPT 53548   Therapeutic Activity  CPT 17816 Done Today Sets/Reps Comments Time   Pt. Education y  Pt education on examination findings, prognosis, pathology, PT POC, HEP    Update HEP y      PN                      Total Time for Session 38-52 Minutes Group CPT 85326   Therapeutic Exercise  CPT 14760 Done Today Sets/Reps Comments Time          Recumbent Bike       Upright bike       Elliptical       Treadmill y 10:00 2.2-2.4 mph                  Stretches:        Calf Stretch y :60 Has slantboard at home  Lvl 5    Adductor stretch y :30x3 SOS           Modified tequila y 3x:30 RLE; with SOS    Piriformis stretch y 2x:30 Pull towards opp shoulder and away    DKTC n :5x10 Red ball    Standing side lunge y :30x2 Standing at // bars    PQS y 3x30s R only    Strengthening: (table/seated)       S/L Clams y 5'' 2x10 RTB    Reverse clam y 5'' 2x10 YTB    SLR with TAC       S/L Hip Add        S/L Hip Abd HEP x10     Bridge n 2x10     Bridge with Hip add n 5''x10     Bridge with TB hip adb y 5''2x10 RTB    TAC n 5''x5     Reverse deadbug n 5''x10     Modified deadbug n x10ea Start position: hooklying    Bridge w/ march p                    Strengthening: (standing)       Mini-squat w/ band y x20 RTB around knees    4 way hip kicks p      Side step y X2 laps Red loop    Monster Walk y X2 laps Red  loop    Pallof press y x10ea Green band     Shoulder ext with march y x10ea GTB    Step ups (Fwd)       Step ups (Lat up and over) y 2x10 8'' step    RDL       Heel Tap (Lat)       TKE                      Total Time for  Session Not performed Group CPT 19617   Re-evaluation  CPT 09871 Done Today Sets/Reps Comments Time   Re-evaluation

## 2024-11-11 NOTE — OP PT TREATMENT LOG
Diagnosis       R Hip adductor strain s/p tenotomy Sharon Carmen   Precautions:   Eval: 10/29  PN: 11/29  PN: 12/27  Re-eval: 1/21  POC till: 1/21      Outcome Measure:  WOMAC     Total Time for Session Not performed Group CPT 61959   Modalities  CPT HP/CP 11485 Done Today Sets/Reps Comments Time   MHP       CP       Vaso               Total Time for Session Not performed Group CPT 92696   Gait Training  CPT 85544 Done Today Sets/Reps Comments Time                  Total Time for Session 8-22 Minutes Group CPT 85383   Manual Therapy  CPT 69895 Done Today Sets/Reps Comments Time   STM   R proximal quads, hip adductors    IASTM y  Hand held roller to proximal quads    Jt mobs       LAD y       Total Time for Session Not performed Group CPT 40654   Nueromuscular Re-education  CPT 61479 Done Today Sets/Reps Comments Time   SLS       Tandem stance                      Total Time for Session Not performed Group CPT 04055   Therapeutic Activity  CPT 18818 Done Today Sets/Reps Comments Time   Pt. Education y  Pt education on examination findings, prognosis, pathology, PT POC, HEP    Update HEP y      PN                      Total Time for Session 38-52 Minutes Group CPT 16008   Therapeutic Exercise  CPT 17024 Done Today Sets/Reps Comments Time          Recumbent Bike       Upright bike       Elliptical       Treadmill y 10:00 2.2-2.4 mph                  Stretches:        Calf Stretch y :60 Has slantboard at home  Lvl 5    Adductor stretch y :30x3 SOS           Modified tequila y 3x:30 RLE; with SOS    Piriformis stretch y 2x:30 Pull towards opp shoulder and away    DKTC n :5x10 Red ball    Standing side lunge y :30x2 Standing at // bars    PQS y 3x30s R only    Strengthening: (table/seated)       S/L Clams y 5'' 2x10 RTB    Reverse clam y 5'' 2x10 YTB    SLR with TAC       S/L Hip Add        S/L Hip Abd HEP x10     Bridge n 2x10     Bridge with Hip add n 5''x10     Bridge with TB hip adb y 5''2x10 RTB    TAC n 5''x5      Reverse deadbug n 5''x10     Modified deadbug n x10ea Start position: hooklying    Bridge w/ march p                    Strengthening: (standing)       Mini-squat w/ band y x20 RTB around knees    4 way hip kicks p      Side step y X2 laps Red loop    Monster Walk y X2 laps Red  loop    Pallof press y x10ea Green band     Shoulder ext with march y x10ea GTB    Step ups (Fwd)       Step ups (Lat up and over) y 2x10 8'' step    RDL       Heel Tap (Lat)       TKE                      Total Time for Session Not performed Group CPT 20950   Re-evaluation  CPT 81613 Done Today Sets/Reps Comments Time   Re-evaluation

## 2024-11-13 ENCOUNTER — HOSPITAL ENCOUNTER (OUTPATIENT)
Dept: PHYSICAL THERAPY | Age: 54
Setting detail: THERAPIES SERIES
Discharge: HOME | End: 2024-11-13
Attending: FAMILY MEDICINE
Payer: COMMERCIAL

## 2024-11-13 DIAGNOSIS — Z98.890 S/P TENDON REPAIR: ICD-10-CM

## 2024-11-13 DIAGNOSIS — M76.891 ADDUCTOR TENDINITIS OF RIGHT HIP: Primary | ICD-10-CM

## 2024-11-13 PROCEDURE — 97110 THERAPEUTIC EXERCISES: CPT | Mod: GP

## 2024-11-13 PROCEDURE — 97140 MANUAL THERAPY 1/> REGIONS: CPT | Mod: GP

## 2024-11-13 NOTE — PROGRESS NOTES
Physical Therapy Visit    PT DAILY NOTE FOR OUTPATIENT THERAPY    Patient: Sharon Morales MRN: [id number]  : 1970 54 y.o.  Referring Physician: Damion Brown MD  Date of Visit: 2024    Certification Dates: 10/29/24 through 25    Diagnosis:   1. Adductor tendinitis of right hip    2. S/P tendon repair        Chief Complaints:  adductor pain, s/p tenotomy    Precautions:          TODAY'S VISIT    Time In Session:  Start Time: 1202  Stop Time: 1254  Time Calculation (min): 52 min   History/Vitals/Pain/Encounter Info - 24 1202          Injury History/Precautions/Daily Required Info    Document Type daily treatment     Primary Therapist Jaz Martel     Chief Complaint/Reason for Visit  adductor pain, s/p tenotomy     Referring Physician Damion Brown MD     History of present illness/functional impairment Pt reports R hip pain beginning in 2024 and is arriving with diagnosis of adductor strain s/p tenotomy performed on 10/10/24. Symptoms described to occur through anterior hip, groin, and upper thigh pain. During [place], pt describes increase in pain the night following resistance-based exercise workout. Two days later she attended a bar class which significantly aggravated pain. Pt visited ortho in August who diagnosed her with a groin tear. She was instructed to rest and only walk on the treadmill or swim to tolerance. Pt followed these instructions, however pain continued to gradually worsen with activity to the point where she could not stand or walk and was admitted to the ED on 24. Pt returned to ortho who recommended tenotomy occurring about 2.5 weeks ago. Pt required to rest following procedure for 2 weeks with no lifting, only light walking. Pt still having similar pain post procedure compared to prior level. Pt has difficulty with bending, lifting, squatting, rising from a chair, stair negotiation, getting in/out of bath, and getting in/out of a car. Pt had f/u with  ortho “a few days ago” who recommended PT for eval and tx.   Significant PMHx includes: B/L knee replacement when she was 44yo, required revision for L one year later, and R the year after that; L hip replacement about 2 years ago (2022). Pt notes having gait deviations since her R knee replacement years ago which may have contributed to her symptoms.     Patient/Family/Caregiver Comments/Observations Pt reports having increase in pain beginning yesterday for no known cause. She notes that yesterday she was sitting at work for most of the day. Last night she could not get comfortable and therefore was not able to sleep well. Pain rated 5/10 when arriving to PT. She swam and used the sauna this morning.     Patient reported fall since last visit No        Pain Assessment    Currently in pain Yes     Preferred Pain Scale number (Numeric Rating Pain Scale)     Pain Side/Orientation right     Pain: Body location Hip     Pain Rating (0-10): Pre Activity 5        Pain Intervention    Intervention  TE, MT     Post Intervention Comments see assessment                    Daily Treatment Assessment and Plan - 11/13/24 1202          Daily Treatment Assessment and Plan    Progress toward goals Progressing     Daily Outcome Summary Pt arrives to PT with elevated pain levels today. Offered MHP start of visit, however pt denies as she used the sauna after swimming this morning. Started session on TM for active warm-up, with modified pace to encourage pain relief. Pt has good resposne to manual therapy with improved R hip pain. Did not progress TE today to minimize exacerbation of symptoms. Pt develops quicker than usual quad fatigue with bridges R>L and therefore unable to complete full 2x10. Pain improved rated 3/10 post session.     Plan and Recommendations Progress as tolerated towards POC                         OBJECTIVE DATA TAKEN TODAY:    None taken    Today's Treatment:    Diagnosis       R Hip adductor strain s/p  tenotomy Sharon Carmen   Precautions:   Eval: 10/29  PN: 11/29  PN: 12/27  Re-eval: 1/21  POC till: 1/21      Outcome Measure:  WOMAC     Total Time for Session Not performed Group CPT 02784   Modalities  CPT HP/CP 06143 Done Today Sets/Reps Comments Time   MHP       CP       Vaso               Total Time for Session Not performed Group CPT 86586   Gait Training  CPT 47838 Done Today Sets/Reps Comments Time                  Total Time for Session 8-22 Minutes Group CPT 98769   Manual Therapy  CPT 66309 Done Today Sets/Reps Comments Time   STM Y  R proximal quads, hip adductors    IASTM y  Hand held roller to proximal quads    Jt mobs       LAD y       Total Time for Session Not performed Group CPT 01793   Nueromuscular Re-education  CPT 29571 Done Today Sets/Reps Comments Time   SLS       Tandem stance                      Total Time for Session Not performed Group CPT 26536   Therapeutic Activity  CPT 55667 Done Today Sets/Reps Comments Time   Pt. Education y  Pt education on examination findings, prognosis, pathology, PT POC, HEP    Update HEP y      PN                      Total Time for Session 38-52 Minutes Group CPT 42003   Therapeutic Exercise  CPT 88251 Done Today Sets/Reps Comments Time          Recumbent Bike       Upright bike       Elliptical       Treadmill y 10:00 2.0 mph                  Stretches:        Calf Stretch n :60 Has slantboard at home  Lvl 5    Adductor stretch y :30x3 SOS    Modified tequila y 3x:30 RLE; with SOS    Piriformis stretch y 2x:30 Pull towards opp shoulder and away    DKTC n :5x10 Red ball    Standing side lunge y :30x2 Standing at // bars    PQS y 3x30s R only           Strengthening: (table/seated)       S/L Clams nv 5'' 2x10 RTB    Reverse clam nv 5'' 2x10 YTB    SLR with TAC       S/L Hip Add        S/L Hip Abd HEP x10     Bridge n 2x10     Bridge with Hip add n 5''x10     Bridge with TB hip adb Y  y 5'' 1x10  5'' 1x6 RTB  ''    TAC n 5''x5     Reverse deadbug n 5''x10      Modified deadbug n x10ea Start position: hooklying    Bridge w/ march p                    Strengthening: (standing)       Mini-squat Y  y X10  x10 RTB around knees  Hip adductor ball squeeze    4 way hip kicks       Side step y X2 laps Red loop    Monster Walk y X2 laps Red  loop    Pallof press y x10ea Green band    Shoulder ext with march y x10ea GTB    Step ups (Fwd)       Step ups (Lat up and over) y 2x10 8'' step    RDL       Heel Tap (Lat)       TKE                      Total Time for Session Not performed Group CPT 10291   Re-evaluation  CPT 99754 Done Today Sets/Reps Comments Time   Re-evaluation

## 2024-11-13 NOTE — OP PT TREATMENT LOG
Diagnosis       R Hip adductor strain s/p tenotomy Sharon Carmen   Precautions:   Eval: 10/29  PN: 11/29  PN: 12/27  Re-eval: 1/21  POC till: 1/21      Outcome Measure:  WOMAC     Total Time for Session Not performed Group CPT 43495   Modalities  CPT HP/CP 82393 Done Today Sets/Reps Comments Time   MHP       CP       Vaso               Total Time for Session Not performed Group CPT 87362   Gait Training  CPT 45637 Done Today Sets/Reps Comments Time                  Total Time for Session 8-22 Minutes Group CPT 12447   Manual Therapy  CPT 13485 Done Today Sets/Reps Comments Time   STM Y  R proximal quads, hip adductors    IASTM y  Hand held roller to proximal quads    Jt mobs       LAD y       Total Time for Session Not performed Group CPT 55774   Nueromuscular Re-education  CPT 25512 Done Today Sets/Reps Comments Time   SLS       Tandem stance                      Total Time for Session Not performed Group CPT 49524   Therapeutic Activity  CPT 10728 Done Today Sets/Reps Comments Time   Pt. Education y  Pt education on examination findings, prognosis, pathology, PT POC, HEP    Update HEP y      PN                      Total Time for Session 38-52 Minutes Group CPT 94188   Therapeutic Exercise  CPT 47469 Done Today Sets/Reps Comments Time          Recumbent Bike       Upright bike       Elliptical       Treadmill y 10:00 2.0 mph                  Stretches:        Calf Stretch n :60 Has slantboard at home  Lvl 5    Adductor stretch y :30x3 SOS    Modified tequila y 3x:30 RLE; with SOS    Piriformis stretch y 2x:30 Pull towards opp shoulder and away    DKTC n :5x10 Red ball    Standing side lunge y :30x2 Standing at // bars    PQS y 3x30s R only           Strengthening: (table/seated)       S/L Clams nv 5'' 2x10 RTB    Reverse clam nv 5'' 2x10 YTB    SLR with TAC       S/L Hip Add        S/L Hip Abd HEP x10     Bridge n 2x10     Bridge with Hip add n 5''x10     Bridge with TB hip adb Y  y 5'' 1x10  5'' 1x6 RTB  ''    TAC n  5''x5     Reverse deadbug n 5''x10     Modified deadbug n x10ea Start position: hooklying    Bridge w/ march p                    Strengthening: (standing)       Mini-squat Y  y X10  x10 RTB around knees  Hip adductor ball squeeze    4 way hip kicks       Side step y X2 laps Red loop    Monster Walk y X2 laps Red  loop    Pallof press y x10ea Green band    Shoulder ext with march y x10ea GTB    Step ups (Fwd)       Step ups (Lat up and over) y 2x10 8'' step    RDL       Heel Tap (Lat)       TKE                      Total Time for Session Not performed Group CPT 40588   Re-evaluation  CPT 22710 Done Today Sets/Reps Comments Time   Re-evaluation

## 2024-11-19 ENCOUNTER — HOSPITAL ENCOUNTER (OUTPATIENT)
Dept: PHYSICAL THERAPY | Age: 54
Setting detail: THERAPIES SERIES
Discharge: HOME | End: 2024-11-19
Attending: FAMILY MEDICINE
Payer: COMMERCIAL

## 2024-11-19 DIAGNOSIS — Z98.890 S/P TENDON REPAIR: ICD-10-CM

## 2024-11-19 DIAGNOSIS — M76.891 ADDUCTOR TENDINITIS OF RIGHT HIP: Primary | ICD-10-CM

## 2024-11-19 PROCEDURE — 97140 MANUAL THERAPY 1/> REGIONS: CPT | Mod: GP

## 2024-11-19 PROCEDURE — 97110 THERAPEUTIC EXERCISES: CPT | Mod: GP

## 2024-11-19 NOTE — PROGRESS NOTES
Physical Therapy Visit    PT DAILY NOTE FOR OUTPATIENT THERAPY    Patient: Sharon Morales MRN: [id number]  : 1970 54 y.o.  Referring Physician: Damion Brown MD  Date of Visit: 2024    Certification Dates: 10/29/24 through 25    Diagnosis:   1. Adductor tendinitis of right hip    2. S/P tendon repair        Chief Complaints:  adductor pain, s/p tenotomy    Precautions:          TODAY'S VISIT    Time In Session:  Start Time: 1207  Stop Time: 1258  Time Calculation (min): 51 min   History/Vitals/Pain/Encounter Info - 24 1206          Injury History/Precautions/Daily Required Info    Document Type daily treatment     Primary Therapist Jaz Martel     Chief Complaint/Reason for Visit  adductor pain, s/p tenotomy     Referring Physician Damion Brown MD     History of present illness/functional impairment Pt reports R hip pain beginning in 2024 and is arriving with diagnosis of adductor strain s/p tenotomy performed on 10/10/24. Symptoms described to occur through anterior hip, groin, and upper thigh pain. During ILDEFONSO, pt describes increase in pain the night following resistance-based exercise workout. Two days later she attended a bar class which significantly aggravated pain. Pt visited ortho in August who diagnosed her with a groin tear. She was instructed to rest and only walk on the treadmill or swim to tolerance. Pt followed these instructions, however pain continued to gradually worsen with activity to the point where she could not stand or walk and was admitted to the ED on 24. Pt returned to ortho who recommended tenotomy occurring about 2.5 weeks ago. Pt required to rest following procedure for 2 weeks with no lifting, only light walking. Pt still having similar pain post procedure compared to prior level. Pt has difficulty with bending, lifting, squatting, rising from a chair, stair negotiation, getting in/out of bath, and getting in/out of a car. Pt had f/u with  ortho “a few days ago” who recommended PT for eval and tx.   Significant PMHx includes: B/L knee replacement when she was 46yo, required revision for L one year later, and R the year after that; L hip replacement about 2 years ago (2022). Pt notes having gait deviations since her R knee replacement years ago which may have contributed to her symptoms.     Patient/Family/Caregiver Comments/Observations Pt continues to have difficulty with bending and lifting d/t R groin pain. Pt has been using a foam roller at home which she feels has helped. She felt sore after last session but to tolerance. Pain rated 3/10 when arriving to PT.     Patient reported fall since last visit No        Pain Assessment    Currently in pain Yes     Preferred Pain Scale number (Numeric Rating Pain Scale)     Pain Side/Orientation right     Pain: Body location Hip     Pain Rating (0-10): Pre Activity 3        Pain Intervention    Intervention  TE, MT     Post Intervention Comments good response                    Daily Treatment Assessment and Plan - 11/19/24 1206          Daily Treatment Assessment and Plan    Progress toward goals Progressing     Daily Outcome Summary TM warm-up deferred d/t pt request as she swam prior to today's session. Pt has improved tolerance to TE compared to previous session. Progressed session to add farmer carry's, 3 way hip kicks, and hip hinge with no adverse effects. Pt does feel appropriate fatigue post session, greater on RLE.     Plan and Recommendations Progress as tolerated towards POC                         OBJECTIVE DATA TAKEN TODAY:    None taken    Today's Treatment:    Diagnosis       R Hip adductor strain s/p tenotomy Sharon Carmen   Precautions:   Eval: 10/29  PN: 11/29  PN: 12/27  Re-eval: 1/21  POC till: 1/21      Outcome Measure:  WOMAC     Total Time for Session Not performed Group CPT 04089   Modalities  CPT HP/CP 52855 Done Today Sets/Reps Comments Time   MHP       CP       Vaso                Total Time for Session Not performed Group CPT 48820   Gait Training  CPT 39711 Done Today Sets/Reps Comments Time                  Total Time for Session 8-22 Minutes Group CPT 37324   Manual Therapy  CPT 74210 Done Today Sets/Reps Comments Time   STM Y  R proximal quads, hip adductors    IASTM y  Hand held roller to proximal quads    Jt mobs       LAD y       Total Time for Session Not performed Group CPT 35894   Nueromuscular Re-education  CPT 76555 Done Today Sets/Reps Comments Time   SLS       Tandem stance                      Total Time for Session Not performed Group CPT 75258   Therapeutic Activity  CPT 99979 Done Today Sets/Reps Comments Time   Pt. Education y  Pt education on examination findings, prognosis, pathology, PT POC, HEP    Update HEP y      PN                      Total Time for Session 38-52 Minutes Group CPT 98230   Therapeutic Exercise  CPT 19654 Done Today Sets/Reps Comments Time          Recumbent Bike       Upright bike       Elliptical       Treadmill nv 10:00 2.0 mph                  Stretches:        Calf Stretch n :60 Has slantboard at home  Lvl 5    Adductor stretch y :30x3 SOS    Modified tequila y 3x:30 RLE; with SOS    Piriformis stretch nv 2x:30 Pull towards opp shoulder and away    DKTC n :5x10 Red ball    Standing side lunge y :30x2 Standing at // bars    PQS y 3x30s R only           Strengthening: (table/seated)       S/L Clams nv 5'' 2x10 RTB    Reverse clam nv 5'' 2x10 YTB    SLR with TAC       S/L Hip Add        S/L Hip Abd HEP x10     Bridge n 2x10     Bridge with Hip add n 5''x10     Bridge with TB hip adb n  n 5'' 1x10  5'' 1x6 RTB  ''    TAC n 5''x5     Reverse deadbug n 5''x10     Modified deadbug n x10ea Start position: hooklying    TAC 90/90 heel taps y x10ea     Deadbug p      Bridge w/ march y 2x10 PLAN:    SL bridge p                    Strengthening: (standing)       Mini-squat Y  y x10  x10 RTB around knees  Hip adductor ball squeeze    3 way hip kicks y 2x10ea  RTB  Standing on elliptical    Side step n X2 laps Red loop    Monster Walk n X2 laps Red  loop    Hip hinge y 2x10     Single leg RDL p      Pallof press n x10ea Green band    Shoulder ext with march n x10ea GTB    Evans's carry with marching y X2 laps With 13lb KB    Step ups (Fwd)       Step ups (lateral) y 2x10 9'' step    RDL       Heel Tap (Lat)       TKE p                     Total Time for Session Not performed Group CPT 59869   Re-evaluation  CPT 52323 Done Today Sets/Reps Comments Time   Re-evaluation

## 2024-11-19 NOTE — OP PT TREATMENT LOG
Diagnosis       R Hip adductor strain s/p tenotomy Sharon Carmen   Precautions:   Eval: 10/29  PN: 11/29  PN: 12/27  Re-eval: 1/21  POC till: 1/21      Outcome Measure:  WOMAC     Total Time for Session Not performed Group CPT 52831   Modalities  CPT HP/CP 54951 Done Today Sets/Reps Comments Time   MHP       CP       Vaso               Total Time for Session Not performed Group CPT 13240   Gait Training  CPT 94861 Done Today Sets/Reps Comments Time                  Total Time for Session 8-22 Minutes Group CPT 57889   Manual Therapy  CPT 59674 Done Today Sets/Reps Comments Time   STM Y  R proximal quads, hip adductors    IASTM y  Hand held roller to proximal quads    Jt mobs       LAD y       Total Time for Session Not performed Group CPT 26578   Nueromuscular Re-education  CPT 50517 Done Today Sets/Reps Comments Time   SLS       Tandem stance                      Total Time for Session Not performed Group CPT 00410   Therapeutic Activity  CPT 04942 Done Today Sets/Reps Comments Time   Pt. Education y  Pt education on examination findings, prognosis, pathology, PT POC, HEP    Update HEP y      PN                      Total Time for Session 38-52 Minutes Group CPT 10250   Therapeutic Exercise  CPT 14099 Done Today Sets/Reps Comments Time          Recumbent Bike       Upright bike       Elliptical       Treadmill nv 10:00 2.0 mph                  Stretches:        Calf Stretch n :60 Has slantboard at home  Lvl 5    Adductor stretch y :30x3 SOS    Modified tequila y 3x:30 RLE; with SOS    Piriformis stretch nv 2x:30 Pull towards opp shoulder and away    DKTC n :5x10 Red ball    Standing side lunge y :30x2 Standing at // bars    PQS y 3x30s R only           Strengthening: (table/seated)       S/L Clams nv 5'' 2x10 RTB    Reverse clam nv 5'' 2x10 YTB    SLR with TAC       S/L Hip Add        S/L Hip Abd HEP x10     Bridge n 2x10     Bridge with Hip add n 5''x10     Bridge with TB hip adb n  n 5'' 1x10  5'' 1x6 RTB  ''    TAC  n 5''x5     Reverse deadbug n 5''x10     Modified deadbug n x10ea Start position: hooklying    TAC 90/90 heel taps y x10ea     Deadbug p      Bridge w/ march y 2x10 PLAN:    SL bridge p                    Strengthening: (standing)       Mini-squat Y  y x10  x10 RTB around knees  Hip adductor ball squeeze    3 way hip kicks y 2x10ea RTB  Standing on elliptical    Side step n X2 laps Red loop    Monster Walk n X2 laps Red  loop    Hip hinge y 2x10     Single leg RDL p      Pallof press n x10ea Green band    Shoulder ext with march n x10ea GTB    Evans's carry with marching y X2 laps With 13lb KB    Step ups (Fwd)       Step ups (lateral) y 2x10 9'' step    RDL       Heel Tap (Lat)       TKE p                     Total Time for Session Not performed Group CPT 79828   Re-evaluation  CPT 18585 Done Today Sets/Reps Comments Time   Re-evaluation

## 2024-11-21 ENCOUNTER — HOSPITAL ENCOUNTER (OUTPATIENT)
Dept: PHYSICAL THERAPY | Age: 54
Setting detail: THERAPIES SERIES
Discharge: HOME | End: 2024-11-21
Attending: FAMILY MEDICINE
Payer: COMMERCIAL

## 2024-11-21 DIAGNOSIS — M76.891 ADDUCTOR TENDINITIS OF RIGHT HIP: Primary | ICD-10-CM

## 2024-11-21 DIAGNOSIS — Z98.890 S/P TENDON REPAIR: ICD-10-CM

## 2024-11-21 PROCEDURE — 97110 THERAPEUTIC EXERCISES: CPT | Mod: GP,CQ

## 2024-11-21 PROCEDURE — 97140 MANUAL THERAPY 1/> REGIONS: CPT | Mod: GP,CQ

## 2024-11-21 NOTE — OP PT TREATMENT LOG
Diagnosis       R Hip adductor strain s/p tenotomy Sharon Carmen   Precautions:   Eval: 10/29  PN: 11/29  PN: 12/27  Re-eval: 1/21  POC till: 1/21      Outcome Measure:  WOMAC     Total Time for Session Not performed Group CPT 64162   Modalities  CPT HP/CP 46198 Done Today Sets/Reps Comments Time   MHP       CP       Vaso               Total Time for Session Not performed Group CPT 16779   Gait Training  CPT 06593 Done Today Sets/Reps Comments Time                  Total Time for Session 0-7 Minutes Group CPT 60145   Manual Therapy  CPT 87787 Done Today Sets/Reps Comments Time   STM   R proximal quads, hip adductors    IASTM   Hand held roller to proximal quads    Jt mobs       LAD y   LE [id number]     Total Time for Session Not performed Group CPT 40840   Nueromuscular Re-education  CPT 52321 Done Today Sets/Reps Comments Time   SLS       Tandem stance                      Total Time for Session Not performed Group CPT 71546   Therapeutic Activity  CPT 23392 Done Today Sets/Reps Comments Time   Pt. Education y  Pt education on examination findings, prognosis, pathology, PT POC, HEP    Update HEP y      PN                      Total Time for Session 38-52 Minutes Group CPT 36997   Therapeutic Exercise  CPT 55298 Done Today Sets/Reps Comments Time          Recumbent Bike       Upright bike       Elliptical       Treadmill y 10:00 2.0 mph                  Stretches:        Calf Stretch n :60 Has slantboard at home  Lvl 5    Adductor stretch y :30x3 SOS    Modified tequila y 3x:30 RLE; with SOS    Piriformis stretch nv 2x:30 Pull towards opp shoulder and away    DKTC n :5x10 Red ball    Standing side lunge nv :30x2 Standing at // bars    PQS y 3x30s R only           Strengthening: (table/seated)       S/L Clams y 5'' 2x10 RTB    Reverse clam y 5'' 2x10 YTB    SLR with TAC       S/L Hip Add        S/L Hip Abd HEP x10     Bridge n 2x10     Bridge with Hip add n 5''x10     Bridge with TB hip adb Y   n 5'' 1x10  5'' 1x6 RTB  ''     TAC n 5''x5     Reverse deadbug n 5''x10     Modified deadbug n x10ea Start position: hooklying    TAC 90/90 heel taps  TAC w/ march  Y    y X10ea    x10     Up,up-down,down    Deadbug p      Bridge w/ march y x10     SL bridge p                    Strengthening: (standing)       Mini-squat Nv  nv x10  x10 RTB around knees  Hip adductor ball squeeze    3 way hip kicks y 2x10ea RTB  Standing on elliptical    Side step y X2 laps Red loop    Monster Walk y X2 laps Red  loop    Hip hinge nv 2x10     Single leg RDL p      Pallof press y x10ea Green band    Shoulder ext with march y x10ea GTB    Farmer's carry with marching y X3 laps With 13lb KB    Step ups (Fwd)       Step ups (lateral) nv 2x10 9'' step    RDL       Heel Tap (Lat)       TKE p                     Total Time for Session Not performed Group CPT 07156   Re-evaluation  CPT 54608 Done Today Sets/Reps Comments Time   Re-evaluation

## 2024-11-21 NOTE — PROGRESS NOTES
Physical Therapy Visit    PT DAILY NOTE FOR OUTPATIENT THERAPY    Patient: Sharon Morales MRN: [id number]  : 1970 54 y.o.  Referring Physician: Damion Brown MD  Date of Visit: 2024    Certification Dates: 10/29/24 through 25    Diagnosis:   1. Adductor tendinitis of right hip    2. S/P tendon repair        Chief Complaints:  adductor pain, s/p tenotomy    Precautions:          TODAY'S VISIT    Time In Session:  Start Time: 1205  Stop Time: 1300  Time Calculation (min): 55 min   History/Vitals/Pain/Encounter Info - 24 1212          Injury History/Precautions/Daily Required Info    Document Type daily treatment     Primary Therapist Jaz Martel     Chief Complaint/Reason for Visit  adductor pain, s/p tenotomy     Referring Physician Damion Brown MD     History of present illness/functional impairment Pt reports R hip pain beginning in 2024 and is arriving with diagnosis of adductor strain s/p tenotomy performed on 10/10/24. Symptoms described to occur through anterior hip, groin, and upper thigh pain. During ILDEFONSO, pt describes increase in pain the night following resistance-based exercise workout. Two days later she attended a bar class which significantly aggravated pain. Pt visited ortho in August who diagnosed her with a groin tear. She was instructed to rest and only walk on the treadmill or swim to tolerance. Pt followed these instructions, however pain continued to gradually worsen with activity to the point where she could not stand or walk and was admitted to the ED on 24. Pt returned to ortho who recommended tenotomy occurring about 2.5 weeks ago. Pt required to rest following procedure for 2 weeks with no lifting, only light walking. Pt still having similar pain post procedure compared to prior level. Pt has difficulty with bending, lifting, squatting, rising from a chair, stair negotiation, getting in/out of bath, and getting in/out of a car. Pt had f/u with  ortho “a few days ago” who recommended PT for eval and tx.   Significant PMHx includes: B/L knee replacement when she was 44yo, required revision for L one year later, and R the year after that; L hip replacement about 2 years ago (2022). Pt notes having gait deviations since her R knee replacement years ago which may have contributed to her symptoms.     Patient/Family/Caregiver Comments/Observations Pt. reports she had increased pain in R hip after last session (the next day) that she took two Tylenol and sx's resolved.     Patient reported fall since last visit No        Pain Assessment    Currently in pain Yes     Preferred Pain Scale number (Numeric Rating Pain Scale)     Pain Side/Orientation right     Pain: Body location Hip     Pain Rating (0-10): Pre Activity 4     Pain Rating (0-10): Activity --     Pain Rating (0-10): Post Activity 4        Pain Intervention    Intervention  TE, MT     Post Intervention Comments no worse                    Daily Treatment Assessment and Plan - 11/21/24 1305          Daily Treatment Assessment and Plan    Progress toward goals Progressing     Daily Outcome Summary Pt. reports that her R hip is feeling stiff and mild-moderate pain at 4/10. Started with stretches on mat and MT prior to walking on TM x 10 min. Strenghtening TE as noted on log. Pt. reports no worsening of symptoms with TE. Pt. to monitor response to today's session and report to therapist nv.     Plan and Recommendations Progress as tolerated towards POC                         OBJECTIVE DATA TAKEN TODAY:    None taken    Today's Treatment:    Diagnosis       R Hip adductor strain s/p tenotomy Sharon Carmen   Precautions:   Eval: 10/29  PN: 11/29  PN: 12/27  Re-eval: 1/21  POC till: 1/21      Outcome Measure:  WOMAC     Total Time for Session Not performed Group CPT 77127   Modalities  CPT HP/CP 46696 Done Today Sets/Reps Comments Time   MHP       CP       Vaso               Total Time for Session Not performed  Group CPT 78647   Gait Training  CPT 77833 Done Today Sets/Reps Comments Time                  Total Time for Session 0-7 Minutes Group CPT 00138   Manual Therapy  CPT 79873 Done Today Sets/Reps Comments Time   STM   R proximal quads, hip adductors    IASTM   Hand held roller to proximal quads    Jt mobs       LAD y   LE [id number]     Total Time for Session Not performed Group CPT 11380   Nueromuscular Re-education  CPT 19109 Done Today Sets/Reps Comments Time   SLS       Tandem stance                      Total Time for Session Not performed Group CPT 04866   Therapeutic Activity  CPT 28322 Done Today Sets/Reps Comments Time   Pt. Education y  Pt education on examination findings, prognosis, pathology, PT POC, HEP    Update HEP y      PN                      Total Time for Session 38-52 Minutes Group CPT 57291   Therapeutic Exercise  CPT 29104 Done Today Sets/Reps Comments Time          Recumbent Bike       Upright bike       Elliptical       Treadmill y 10:00 2.0 mph                  Stretches:        Calf Stretch n :60 Has slantboard at home  Lvl 5    Adductor stretch y :30x3 SOS    Modified tequila y 3x:30 RLE; with SOS    Piriformis stretch nv 2x:30 Pull towards opp shoulder and away    DKTC n :5x10 Red ball    Standing side lunge nv :30x2 Standing at // bars    PQS y 3x30s R only           Strengthening: (table/seated)       S/L Clams y 5'' 2x10 RTB    Reverse clam y 5'' 2x10 YTB    SLR with TAC       S/L Hip Add        S/L Hip Abd HEP x10     Bridge n 2x10     Bridge with Hip add n 5''x10     Bridge with TB hip adb Y   n 5'' 1x10  5'' 1x6 RTB  ''    TAC n 5''x5     Reverse deadbug n 5''x10     Modified deadbug n x10ea Start position: hooklying    TAC 90/90 heel taps  TAC w/ march  Y    y X10ea    x10     Up,up-down,down    Deadbug p      Bridge w/ march y x10     SL bridge p                    Strengthening: (standing)       Mini-squat Nv  nv x10  x10 RTB around knees  Hip adductor ball squeeze    3 way hip  kicks y 2x10ea RTB  Standing on elliptical    Side step y X2 laps Red loop    Monster Walk y X2 laps Red  loop    Hip hinge nv 2x10     Single leg RDL p      Pallof press y x10ea Green band    Shoulder ext with march y x10ea GTB    Farmer's carry with marching y X3 laps With 13lb KB    Step ups (Fwd)       Step ups (lateral) nv 2x10 9'' step    RDL       Heel Tap (Lat)       TKE p                     Total Time for Session Not performed Group CPT 28062   Re-evaluation  CPT 59101 Done Today Sets/Reps Comments Time   Re-evaluation

## 2024-11-29 ENCOUNTER — HOSPITAL ENCOUNTER (OUTPATIENT)
Dept: PHYSICAL THERAPY | Age: 54
Setting detail: THERAPIES SERIES
Discharge: HOME | End: 2024-11-29
Attending: FAMILY MEDICINE
Payer: COMMERCIAL

## 2024-11-29 DIAGNOSIS — Z98.890 S/P TENDON REPAIR: ICD-10-CM

## 2024-11-29 DIAGNOSIS — M76.891 ADDUCTOR TENDINITIS OF RIGHT HIP: Primary | ICD-10-CM

## 2024-11-29 PROCEDURE — 97530 THERAPEUTIC ACTIVITIES: CPT | Mod: GP

## 2024-11-29 PROCEDURE — 97110 THERAPEUTIC EXERCISES: CPT | Mod: GP

## 2024-11-29 NOTE — OP PT TREATMENT LOG
Diagnosis       R Hip adductor strain s/p tenotomy Sharon Carmen   Precautions:   Eval: 10/29  PN: 11/29  PN: 12/27  Re-eval: 1/21  POC till: 1/21      Outcome Measure:  WOMAC     Total Time for Session Not performed Group CPT 13707   Modalities  CPT HP/CP 08844 Done Today Sets/Reps Comments Time   MHP       CP       Vaso               Total Time for Session Not performed Group CPT 81353   Gait Training  CPT 28679 Done Today Sets/Reps Comments Time                  Total Time for Session 0-7 Minutes Group CPT 59550   Manual Therapy  CPT 79559 Done Today Sets/Reps Comments Time   STM   R proximal quads, hip adductors    IASTM   Hand held roller to proximal quads    Jt mobs       LAD y   LE [id number]     Total Time for Session Not performed Group CPT 60442   Nueromuscular Re-education  CPT 90890 Done Today Sets/Reps Comments Time   SLS       Tandem stance                      Total Time for Session 38-52 Minutes Group CPT 21797   Therapeutic Activity  CPT 64994 Done Today Sets/Reps Comments Time   Pt. Education y  Pt education on examination findings, progress towards goals, PT POC, and HEP    Update HEP y  Added core strength to HEP    PN Y                     Total Time for Session 8-22 Minutes Group CPT 86999   Therapeutic Exercise  CPT 96933 Done Today Sets/Reps Comments Time          Recumbent Bike       Upright bike       Elliptical       Treadmill nv 10:00 2.0 mph  PLAN: with incline                  Stretches:        Calf Stretch n :60 Has slantboard at home  Lvl 5    Adductor stretch nv :30x3 SOS    Modified tequila nv 3x:30 RLE; with SOS    Piriformis stretch nv 2x:30 Pull towards opp shoulder and away    DKTC n :5x10 Red ball    Standing side lunge nv :30x2 Standing at // bars    PQS nv 3x30s R only           Strengthening: (table/seated)       S/L Clams Y- HEP 5'' 2x10 RTB    Reverse clam Y- HEP 5'' 2x10 GTB    SLR with TAC       S/L Hip Add        S/L Hip Abd HEP x10     Bridge n 2x10     Bridge with Hip add n  5''x10     Bridge with TB hip adb n  n 5'' 1x10  5'' 1x6 RTB  ''    Bridge on Bosu with march p      TAC n 5''x5     Reverse deadbug n 5''x10     Modified deadbug n x10ea Start position: hooklying    TAC 90/90 heel taps  TAC w/ march  nv    n X10ea    x10     Up,up-down,down    Bicycle kicks y x20ea Forward and reverse    Deadbug p      Bridge w/ march nv x10     SL bridge p                    Strengthening: (standing)       Mini-squat N  n x10  x10 RTB around knees  Hip adductor ball squeeze    Front Planks Y- HEP 2x30s Modified on knees    3 way hip kicks nv 2x10ea RTB  Standing on elliptical    Side step nv X2 laps Red loop    Monster Walk nv X2 laps Red  loop    Hip hinge Nv* 2x10     Single leg RDL P*      Goblet squat P*      Slider lunge P*      Pallof press y x10ea Green band  In staggered stance  With OH lift    Shoulder ext with march nv x10ea GTB    Evans's carry with marching nv X3 laps With 13lb KB  *Pt has KB at home (lightest 18lbs)    PNF Chops/ lifts y x15ea GTB  Down= double  Up= single    Step ups (Fwd)       Step ups (lateral) nv 2x10 9'' step                   Total Time for Session Not performed Group CPT 12637   Re-evaluation  CPT 43848 Done Today Sets/Reps Comments Time   Re-evaluation

## 2024-11-29 NOTE — PROGRESS NOTES
Physical Therapy Progress Note    PT PROGRESS NOTE FOR OUTPATIENT THERAPY    Patient: Sharon Morales   MRN: [id number]  : 1970 54 y.o.    Referring Physician: Damion Brown MD  Date of Visit: 2024    Certification Dates: 10/29/24 through 25    Recommended Frequency & Duration:  2 times/week for up to 8 weeks   1-2x a week    Diagnosis:   1. Adductor tendinitis of right hip    2. S/P tendon repair        Chief Complaints:  No chief complaint on file.      Precautions:        TODAY'S VISIT:    Time In Session:  Start Time: 1300  Stop Time: 1402  Time Calculation (min): 62 min   General Information - 24 1258          Session Details    Document Type progress note     Mode of Treatment individual therapy        General Information    Referring Physician Damion Brown MD     History of present illness/functional impairment Pt reports R hip pain beginning in 2024 and is arriving with diagnosis of adductor strain s/p tenotomy performed on 10/10/24. Symptoms described to occur through anterior hip, groin, and upper thigh pain. During IL[place], pt describes increase in pain the night following resistance-based exercise workout. Two days later she attended a bar class which significantly aggravated pain. Pt visited ortho in August who diagnosed her with a groin tear. She was instructed to rest and only walk on the treadmill or swim to tolerance. Pt followed these instructions, however pain continued to gradually worsen with activity to the point where she could not stand or walk and was admitted to the ED on 24. Pt returned to ortho who recommended tenotomy occurring about 2.5 weeks ago. Pt required to rest following procedure for 2 weeks with no lifting, only light walking. Pt still having similar pain post procedure compared to prior level. Pt has difficulty with bending, lifting, squatting, rising from a chair, stair negotiation, getting in/out of bath, and getting in/out of a car. Pt  "had f/u with ortho “a few days ago” who recommended PT for eval and tx.   Significant PMHx includes: B/L knee replacement when she was 44yo, required revision for L one year later, and R the year after that; L hip replacement about 2 years ago (2022). Pt notes having gait deviations since her R knee replacement years ago which may have contributed to her symptoms.     Patient/Family/Caregiver Comments/Observations Pt reports that symptoms have been improving overall, with less pain through her R anterior thigh. Pt describes having flare up yesterday from hosting Thanksgiving. Pt does continue to have \"a decent amount of pain\" through the lateral aspect of her hip which is worse at the end of the day, however pain intensity has improved. Pt occasionally has pain with driving >25 minutes. Pt continues to have difficulty with bending and lifting objects from the floor.                    Daily Falls Screen - 11/29/24 1258          Daily Falls Assessment    Patient reported fall since last visit No                    Pain/Vitals - 11/29/24 1258          Pain Assessment    Currently in pain Yes     Preferred Pain Scale number (Numeric Rating Pain Scale)     Pain Side/Orientation right     Pain: Body location Hip     Pain Rating (0-10): Pre Activity 6     Pain Level at Best 0     Pain Level at Worst 6        Pain Intervention    Intervention  monitored     Post Intervention Comments monitored                    PT - 11/29/24 1258          Physical Therapy    Physical Therapy Specialty Ortho and Sports PT        PT Plan    Frequency of treatment 2 times/week     PT Duration 8 weeks     PT Custom Frequency and Duration 1-2x a week     PT Cert From 10/29/24     PT Cert To 01/21/25     Date PT POC was sent to provider 10/29/24     Signed PT Plan of Care received?  Yes                    Assessment and Plan - 11/29/24 1258          Assessment    Plan of Care reviewed and patient/family in agreement Yes     System " Pathology/Pathophysiology Noted musculoskeletal;neuromuscular     Functional Limitations in Following Categories (PT Eval) self-care;community/leisure     Rehab Potential/Prognosis good, to achieve stated therapy goals     Problem List decreased ROM;pain     Clinical Assessment Pt presents with steady improvements in R hip AROM, gross LE strength with decreased pain during hip ER testing, 30s STS endurance, functional squat form, and functional abilities as per WOMAC score. Pt continues to p/w objective limitations including decreased R hip ROM into hip IR, ER, flexion, and extension; decreased GT joint mobility, decreased LE strength, balance/proprioception deficits, gait deviations, limited rectus femoris flexibility, and decreased mobility with functional squatting. Pt has difficulty with ADLs and functional activities including extended sitting, squatting, bending, lifting. Pt will benefit from skilled PT to improve residual impairments and promote function with fewer deficits.     Plan and Recommendations Provide pt with updated HEP which includes core nv (not yet printed: modifeid plank, chops/lifts, OH pallof press, and fwd/reverse bicylce); Assess response to clearance to return to walking on TM incline and UE/LE gentle weight lifting at gym; Progress as tolerated towards POC     Planned Services CPT 34773 Gait training;CPT 06074 Manual therapy;CPT 93036 Neuromuscular Reeducation;CPT 69315 Self-care/Home management training;CPT 09971 Therapeutic activities;CPT 41930 Therapeutic exercises;CPT 51548 Electrical stimulation UNATTENDED;CPT 33796 Hot/Cold Packs therapy;CPT 29539 Vasopneumatic device therapy (Application of blood vessel compression or decompression device to 1 or more areas)                     OBJECTIVE MEASUREMENTS/DATA:    ROM    Range of Motion - 11/29/24 1300          RIGHT: Lower Extremity AROM Assessment    Hip Flexion   114 degrees     Hip Extension   8 degrees   Discomfort thorugh front of  hip    Hip Abduction   50 degrees     Hip Internal Rotation   40 degrees     Hip External Rotation   35 degrees                   MMT    Manual Muscle Tests - 11/29/24 1258          RIGHT: Lower Extremity Manual Muscle Test Assessment    Hip Flexion gross movement (4/5) good     Hip Extension gross movement (4/5) good     Hip Abduction gross movement (4-/5) good minus     Hip Internal Rotation gross movement (4/5) good     Hip External Rotation gross movement (4-/5) good minus   no pain    Knee Flexion strength (4+/5) good plus     Knee Extension strength (4+/5) good plus        LEFT: Lower Extremity Manual Muscle Test Assessment    Hip Flexion gross movement (4+/5) good plus     Hip Extension gross movement (4+/5) good plus     Hip Abduction gross movement (5/5) normal     Hip Internal Rotation gross movement (4/5) good   pain in L groin    Hip External Rotation gross movement (4-/5) good minus     Knee Flexion strength (5/5) normal     Knee Extension strength (5/5) normal        Additional Manual Muscle Tests    Additional MMT Plank endurance: Modified front plank= 31s                   Palpation    Palpation - 11/29/24 1258          Palpation    LE Palpation  TTP R proximal quad; increased tone quad and hip adductors                   Ortho Special Tests    Orthopedic Special Tests - 11/29/24 1400          Hip/Pelvis Special Tests    [place] Right - Positive   for rectus femoris tightness                  Balance/Posture    Balance and Posture - 11/29/24 1258          Postural Deviations    Postural Deviations low back;pelvis     Low Back lordosis     Pelvis anterior pelvic tilt                   Gait and Mobility    Gait and Mobility - 11/29/24 1258          Gait Training    [place], Gait independent     Variable surfaces Flat surface     Distance in Feet 20 feet     Pattern 2-point     Deviations/Abnormal Patterns right sided deviations;antalgic;razia decreased;step length decreased;stride length  decreased     Comment R>L in-toeing, decreased knee extension with termal stance on R, early heel rise bilaterally                   Outcome Measures    PT Outcome Measures - 11/29/24 1258          Objective Outcome Measures    30 Second Sit to Stand Test 14 repetitions   slight groin pain 1/10       Other Outcome Measures Used/Comments    Other outcome measure used: WOMAC: 27%                     ROM and MMT          10/29/2024 11/29/2024   PT LE ROM Measurements   AROM: Right Hip Flexion 102 degrees 114 degrees   AROM: Left Hip Flexion 118 degrees    AROM: Right Hip Extension 8 degrees       Discomfort thorugh front of hip 8 degrees       Discomfort thorugh front of hip   AROM: Left Hip Extension 8 degrees    AROM: Right Hip ABD 45 degrees 50 degrees   AROM: Left Hip ABD 42 degrees    AROM: Right Hip IR 40 degrees 40 degrees   AROM: Left Hip IR 35 degrees    AROM: Right Hip ER 30 degrees 35 degrees   AROM: Left Hip ER 25 degrees    PT LE MMT   Right Hip Flexion (4-/5) good minus (4/5) good   Left Hip Flexion (4+/5) good plus (4+/5) good plus   Right Hip Extension (4/5) good (4/5) good   Left Hip Extension (4+/5) good plus (4+/5) good plus   Right Hip ABD (4-/5) good minus (4-/5) good minus   Left Hip ABD (5/5) normal (5/5) normal   Right Hip IR (4-/5) good minus (4/5) good   Left Hip IR (4/5) good       pain in L groin (4/5) good       pain in L groin   Right Hip ER (3+/5) fair plus       pain in R groin (4-/5) good minus       no pain   Left Hip ER (4-/5) good minus (4-/5) good minus   Right Knee Flexion (4/5) good (4+/5) good plus   Left Knee Flexion (5/5) normal (5/5) normal   Right Knee Extension (4+/5) good plus (4+/5) good plus   Left Knee Extension (5/5) normal (5/5) normal     Outcome Measures          10/29/2024    12:02 11/29/2024    12:58   PT OBJECTIVE Outcome Measures   30 Second Sit to Stand 12 repetitions       1/10 pain through groing region on R 14 repetitions       slight groin pain 1/10   PT  SUBJECTIVE Outcome Measures   Other WOMAC: 47% WOMAC: 27%       Today's Treatment::    Education provided:  Yes: See treatment log for details of education provided    Diagnosis       R Hip adductor strain s/p tenotomy Sharon Carmen   Precautions:   Eval: 10/29  PN: 11/29  PN: 12/27  Re-eval: 1/21  POC till: 1/21      Outcome Measure:  WOMAC     Total Time for Session Not performed Group CPT 88738   Modalities  CPT HP/CP 26726 Done Today Sets/Reps Comments Time   MHP       CP       Vaso               Total Time for Session Not performed Group CPT 75760   Gait Training  CPT 60122 Done Today Sets/Reps Comments Time                  Total Time for Session 0-7 Minutes Group CPT 99809   Manual Therapy  CPT 81920 Done Today Sets/Reps Comments Time   STM   R proximal quads, hip adductors    IASTM   Hand held roller to proximal quads    Jt mobs       LAD y   LE [id number]     Total Time for Session Not performed Group CPT 13889   Nueromuscular Re-education  CPT 32889 Done Today Sets/Reps Comments Time   SLS       Tandem stance                      Total Time for Session 38-52 Minutes Group CPT 45368   Therapeutic Activity  CPT 53010 Done Today Sets/Reps Comments Time   Pt. Education y  Pt education on examination findings, progress towards goals, PT POC, and HEP    Update HEP y  Added core strength to HEP    PN Y                     Total Time for Session 8-22 Minutes Group CPT 07164   Therapeutic Exercise  CPT 47451 Done Today Sets/Reps Comments Time          Recumbent Bike       Upright bike       Elliptical       Treadmill nv 10:00 2.0 mph  PLAN: with incline                  Stretches:        Calf Stretch n :60 Has slantboard at home  Lvl 5    Adductor stretch nv :30x3 SOS    Modified tequila nv 3x:30 RLE; with SOS    Piriformis stretch nv 2x:30 Pull towards opp shoulder and away    DKTC n :5x10 Red ball    Standing side lunge nv :30x2 Standing at // bars    PQS nv 3x30s R only           Strengthening: (table/seated)        S/L Clams Y- HEP 5'' 2x10 RTB    Reverse clam Y- HEP 5'' 2x10 GTB    SLR with TAC       S/L Hip Add        S/L Hip Abd HEP x10     Bridge n 2x10     Bridge with Hip add n 5''x10     Bridge with TB hip adb n  n 5'' 1x10  5'' 1x6 RTB  ''    Bridge on Bosu with march p      TAC n 5''x5     Reverse deadbug n 5''x10     Modified deadbug n x10ea Start position: hooklying    TAC 90/90 heel taps  TAC w/ march  nv    n X10ea    x10     Up,up-down,down    Bicycle kicks y x20ea Forward and reverse    Deadbug p      Bridge w/ march nv x10     SL bridge p                    Strengthening: (standing)       Mini-squat N  n x10  x10 RTB around knees  Hip adductor ball squeeze    Front Planks Y- HEP 2x30s Modified on knees    3 way hip kicks nv 2x10ea RTB  Standing on elliptical    Side step nv X2 laps Red loop    Monster Walk nv X2 laps Red  loop    Hip hinge Nv* 2x10     Single leg RDL P*      Goblet squat P*      Slider lunge P*      Pallof press y x10ea Green band  In staggered stance  With OH lift    Shoulder ext with march nv x10ea GTB    Evans's carry with marching nv X3 laps With 13lb KB  *Pt has KB at home (lightest 18lbs)    PNF Chops/ lifts y x15ea GTB  Down= double  Up= single    Step ups (Fwd)       Step ups (lateral) nv 2x10 9'' step                   Total Time for Session Not performed Group CPT 51736   Re-evaluation  CPT 75618 Done Today Sets/Reps Comments Time   Re-evaluation                    Goals Addressed                      This Visit's Progress      Mutually agreed upon pain goal         Mutually agreed upon pain goal: 0/10    11/29: Pain at worst 4/10        Patient Stated (pt-stated)         Pt will be able to return to resistance training at the gym without pain.     11/29: Pt has not returned to resistance training at gym as per PT's recommendations; Pt has returned to back stroke swimming and higher level ADLs         PT R hip Goals         Short Term Goals Time Frame (weeks) Result Comment/Progress    Pt will be I with HEP to promote ability to self manage symptoms 4 met    Pain at worst rated no greater than 5/10 for improved QOL 4 met    Joint ROM will improve by 5 deg to promote improved tolerance to ADLs 4 met    Pt will improve LE strength where limited by 1/2 MMT grade to promote ability to ascend stairs 4 met    Subjective outcome scores will improve by 9 points 4 met    Pt will increase 30s STS by 1 rep to indicate improved endurance 4 met    Pt will perform a functional squat without genu valgus 4 met        Long Term Goals Time Frame (weeks) Result Comment/Progress   Pt will be I and compliant with updated HEP 8     Pain at worst will decrease to no greater than 1/10 for improved QOL 8     Joint ROM will improve by 15-20 deg to promote improved tolerance to ADLs 8     Pt will improve LE strength where limited by 1 MMT grade to promote improved ability to ascend stairs 8     Subjective outcome scores will improve by 18 points 8     Pt will increase 30s STS to >/= 15 reps to indicate improved endurance 8     Pt will perform single leg functional squats without genu valgus or pain 8                    Jaz Martel, PT

## 2024-12-02 ENCOUNTER — HOSPITAL ENCOUNTER (OUTPATIENT)
Dept: PHYSICAL THERAPY | Age: 54
Setting detail: THERAPIES SERIES
Discharge: HOME | End: 2024-12-02
Attending: FAMILY MEDICINE
Payer: COMMERCIAL

## 2024-12-02 DIAGNOSIS — M76.891 ADDUCTOR TENDINITIS OF RIGHT HIP: Primary | ICD-10-CM

## 2024-12-02 DIAGNOSIS — Z98.890 S/P TENDON REPAIR: ICD-10-CM

## 2024-12-02 PROCEDURE — 97110 THERAPEUTIC EXERCISES: CPT | Mod: GP,CQ

## 2024-12-02 NOTE — OP PT TREATMENT LOG
Diagnosis       R Hip adductor strain s/p tenotomy Sharon Carmen   Precautions:   Eval: 10/29  PN: 11/29  PN: 12/27  Re-eval: 1/21  POC till: 1/21      Outcome Measure:  WOMAC     Total Time for Session Not performed Group CPT 95458   Modalities  CPT HP/CP 40904 Done Today Sets/Reps Comments Time   MHP       CP       Vaso               Total Time for Session Not performed Group CPT 75481   Gait Training  CPT 51821 Done Today Sets/Reps Comments Time                  Total Time for Session Not performed Group CPT 25621   Manual Therapy  CPT 42320 Done Today Sets/Reps Comments Time   STM   R proximal quads, hip adductors    IASTM   Hand held roller to proximal quads    Jt mobs       LAD y   LE [id number]     Total Time for Session Not performed Group CPT 68770   Nueromuscular Re-education  CPT 11058 Done Today Sets/Reps Comments Time   SLS       Tandem stance                      Total Time for Session Not performed Group CPT 85741   Therapeutic Activity  CPT 00935 Done Today Sets/Reps Comments Time   Pt. Education y  Pt education on examination findings, progress towards goals, PT POC, and HEP    Update HEP y  Added core strength to HEP    PN Y                     Total Time for Session 53-67 Minutes Group CPT 18740   Therapeutic Exercise  CPT 78178 Done Today Sets/Reps Comments Time          Recumbent Bike       Upright bike       Elliptical       Treadmill y 10:00 2.0 mph  PLAN: with incline                  Stretches:        Calf Stretch y :60 Has slantboard at home  Lvl 5    Adductor stretch nv :30x3 SOS    Modified tequila nv 3x:30 RLE; with SOS    Piriformis stretch y 2x:30 Pull towards opp shoulder and away    DKTC y :5x10 Red ball    Standing side lunge nv :30x2 Standing at // bars    PQS nv 3x30s R only           Strengthening: (table/seated)       S/L Clams Y- HEP 5'' 2x10 RTB    Reverse clam Y- HEP 5'' 2x10 GTB    SLR with TAC       S/L Hip Add        S/L Hip Abd HEP x10     Bridge n 2x10     Bridge with Hip add  n 5''x10     Bridge with TB hip adb n  n 5'' 1x10  5'' 1x6 RTB  ''    Bridge on Bosu with march p      TAC n 5''x5     Reverse deadbug n 5''x10     Modified deadbug n x10ea Start position: hooklying    TAC 90/90 heel taps  TAC w/ march  nv    n X10ea    x10     Up,up-down,down    Bicycle kicks y x20ea Forward and reverse    Deadbug y 2x10     Bridge w/ march nv x10     SL bridge p                    Strengthening: (standing)       Mini-squat N  n x10  x10 RTB around knees  Hip adductor ball squeeze    Front Planks Y- HEP 2x30s Modified on knees    3 way hip kicks nv 2x10ea RTB  Standing on elliptical    Side step nv X2 laps Red loop    Monster Walk nv X2 laps Red  loop    Hip hinge Nv* 2x10     Single leg RDL P*      Goblet squat P*      Slider lunge P*      Pallof press y x10ea Green band  In staggered stance  With OH lift    Shoulder ext with march nv x10ea GTB    Evans's carry with marching y X3 laps With 13lb KB  *Pt has KB at home (lightest 18lbs)    PNF Chops/ lifts y x15ea GTB  Down= double  Up= single    Step ups (Fwd)       Step ups (lateral) nv 2x10 9'' step    Planked hip ext y 2x10 Red PB in chair             Total Time for Session Not performed Group CPT 09732   Re-evaluation  CPT 31445 Done Today Sets/Reps Comments Time   Re-evaluation

## 2024-12-02 NOTE — PROGRESS NOTES
Physical Therapy Visit    PT DAILY NOTE FOR OUTPATIENT THERAPY    Patient: Sharon Morales MRN: [id number]  : 1970 54 y.o.  Referring Physician: Damion Brown MD  Date of Visit: 2024    Certification Dates: 10/29/24 through 25    Diagnosis:   1. Adductor tendinitis of right hip    2. S/P tendon repair        Chief Complaints:  adductor pain, s/p tenotomy    Precautions:          TODAY'S VISIT    Time In Session:  Start Time: 1200  Stop Time: 1257  Time Calculation (min): 57 min   History/Vitals/Pain/Encounter Info - 24 1200          Injury History/Precautions/Daily Required Info    Document Type daily treatment     Primary Therapist Jaz Martel     Chief Complaint/Reason for Visit  adductor pain, s/p tenotomy     Referring Physician Damion Brown MD     History of present illness/functional impairment Pt reports R hip pain beginning in 2024 and is arriving with diagnosis of adductor strain s/p tenotomy performed on 10/10/24. Symptoms described to occur through anterior hip, groin, and upper thigh pain. During ILDEFONSO, pt describes increase in pain the night following resistance-based exercise workout. Two days later she attended a bar class which significantly aggravated pain. Pt visited ortho in August who diagnosed her with a groin tear. She was instructed to rest and only walk on the treadmill or swim to tolerance. Pt followed these instructions, however pain continued to gradually worsen with activity to the point where she could not stand or walk and was admitted to the ED on 24. Pt returned to ortho who recommended tenotomy occurring about 2.5 weeks ago. Pt required to rest following procedure for 2 weeks with no lifting, only light walking. Pt still having similar pain post procedure compared to prior level. Pt has difficulty with bending, lifting, squatting, rising from a chair, stair negotiation, getting in/out of bath, and getting in/out of a car. Pt had f/u with ortho  “a few days ago” who recommended PT for eval and tx.   Significant PMHx includes: B/L knee replacement when she was 44yo, required revision for L one year later, and R the year after that; L hip replacement about 2 years ago (2022). Pt notes having gait deviations since her R knee replacement years ago which may have contributed to her symptoms.     Patient/Family/Caregiver Comments/Observations Sharon said she swam for 45 minutes this morning but admits she has not done any of her stretches recently.     Patient reported fall since last visit No        Pain Assessment    Currently in pain Yes     Preferred Pain Scale number (Numeric Rating Pain Scale)     Pain Side/Orientation right     Pain: Body location Hip     Pain Rating (0-10): Activity 5        Pain Intervention    Intervention  stretching, TE     Post Intervention Comments see assessment                    Daily Treatment Assessment and Plan - 12/02/24 1200          Daily Treatment Assessment and Plan    Progress toward goals Progressing     Daily Outcome Summary Sharon had some questions about the exercises in her program that were clarified for her as they arose. Focused on hip and core strengthening. Added slider lunging in carpeted hallway as outlined on flowsheet. Plan to provide printed HEP updates next session.     Plan and Recommendations Provide pt with updated HEP which includes core nv (not yet printed: modifeid plank, chops/lifts, OH pallof press, and fwd/reverse bicylce); Assess response to clearance to return to walking on TM incline and UE/LE gentle weight lifting at gym; Progress as tolerated towards POC                         OBJECTIVE DATA TAKEN TODAY:    Today's Treatment:    Diagnosis       R Hip adductor strain s/p tenotomy Sharon Carmen   Precautions:   Eval: 10/29  PN: 11/29  PN: 12/27  Re-eval: 1/21  POC till: 1/21      Outcome Measure:  WOMAC     Total Time for Session Not performed Group CPT 95208   Modalities  CPT HP/CP 27802 Done Today  Sets/Reps Comments Time   MHP       CP       Vaso               Total Time for Session Not performed Group CPT 45652   Gait Training  CPT 24471 Done Today Sets/Reps Comments Time                  Total Time for Session Not performed Group CPT 47563   Manual Therapy  CPT 00817 Done Today Sets/Reps Comments Time   STM   R proximal quads, hip adductors    IASTM   Hand held roller to proximal quads    Jt mobs       LAD y   LE [id number]     Total Time for Session Not performed Group CPT 75395   Nueromuscular Re-education  CPT 91780 Done Today Sets/Reps Comments Time   SLS       Tandem stance                      Total Time for Session Not performed Group CPT 79425   Therapeutic Activity  CPT 14071 Done Today Sets/Reps Comments Time   Pt. Education y  Pt education on examination findings, progress towards goals, PT POC, and HEP    Update HEP y  Added core strength to HEP    PN Y                     Total Time for Session 53-67 Minutes Group CPT 35422   Therapeutic Exercise  CPT 36742 Done Today Sets/Reps Comments Time          Recumbent Bike       Upright bike       Elliptical       Treadmill y 10:00 2.0 mph  PLAN: with incline                  Stretches:        Calf Stretch y :60 Has slantboard at home  Lvl 5    Adductor stretch nv :30x3 SOS    Modified tequila nv 3x:30 RLE; with SOS    Piriformis stretch y 2x:30 Pull towards opp shoulder and away    DKTC y :5x10 Red ball    Standing side lunge nv :30x2 Standing at // bars    PQS nv 3x30s R only           Strengthening: (table/seated)       S/L Clams Y- HEP 5'' 2x10 RTB    Reverse clam Y- HEP 5'' 2x10 GTB    SLR with TAC       S/L Hip Add        S/L Hip Abd HEP x10     Bridge n 2x10     Bridge with Hip add n 5''x10     Bridge with TB hip adb n  n 5'' 1x10  5'' 1x6 RTB  ''    Bridge on Bosu with march p      TAC n 5''x5     Reverse deadbug n 5''x10     Modified deadbug n x10ea Start position: hooklying    TAC 90/90 heel taps  TAC w/ march  nv    n X10ea    x10      Up,up-down,down    Bicycle kicks y x20ea Forward and reverse    Deadbug y 2x10     Bridge w/ march nv x10     SL bridge p                    Strengthening: (standing)       Mini-squat N  n x10  x10 RTB around knees  Hip adductor ball squeeze    Front Planks Y- HEP 2x30s Modified on knees    3 way hip kicks nv 2x10ea RTB  Standing on elliptical    Side step nv X2 laps Red loop    Monster Walk nv X2 laps Red  loop    Hip hinge Nv* 2x10     Single leg RDL P*      Goblet squat P*      Slider lunge P*      Pallof press y x10ea Green band  In staggered stance  With OH lift    Shoulder ext with march nv x10ea GTB    Evans's carry with marching y X3 laps With 13lb KB  *Pt has KB at home (lightest 18lbs)    PNF Chops/ lifts y x15ea GTB  Down= double  Up= single    Step ups (Fwd)       Step ups (lateral) nv 2x10 9'' step    Planked hip ext y 2x10 Red PB in chair             Total Time for Session Not performed Group CPT 55022   Re-evaluation  CPT 86885 Done Today Sets/Reps Comments Time   Re-evaluation

## 2024-12-05 ENCOUNTER — HOSPITAL ENCOUNTER (OUTPATIENT)
Dept: PHYSICAL THERAPY | Age: 54
Setting detail: THERAPIES SERIES
Discharge: HOME | End: 2024-12-05
Attending: FAMILY MEDICINE
Payer: COMMERCIAL

## 2024-12-05 DIAGNOSIS — M76.891 ADDUCTOR TENDINITIS OF RIGHT HIP: Primary | ICD-10-CM

## 2024-12-05 DIAGNOSIS — Z98.890 S/P TENDON REPAIR: ICD-10-CM

## 2024-12-05 PROCEDURE — 97110 THERAPEUTIC EXERCISES: CPT | Mod: GP

## 2024-12-05 NOTE — OP PT TREATMENT LOG
Diagnosis       R Hip adductor strain s/p tenotomy Sharon Carmen   Precautions:   Eval: 10/29  PN: 11/29  PN: 12/27  Re-eval: 1/21  POC till: 1/21      Outcome Measure:  WOMAC     Total Time for Session Not performed Group CPT 63584   Modalities  CPT HP/CP 99245 Done Today Sets/Reps Comments Time   MHP       CP       Vaso               Total Time for Session Not performed Group CPT 89817   Gait Training  CPT 45032 Done Today Sets/Reps Comments Time                  Total Time for Session Not performed Group CPT 54388   Manual Therapy  CPT 70757 Done Today Sets/Reps Comments Time   STM   R proximal quads, hip adductors    IASTM   Hand held roller to proximal quads    Jt mobs       LAD y   LE [id number]     Total Time for Session Not performed Group CPT 24985   Nueromuscular Re-education  CPT 79096 Done Today Sets/Reps Comments Time   SLS       Tandem stance                      Total Time for Session Not performed Group CPT 27392   Therapeutic Activity  CPT 17282 Done Today Sets/Reps Comments Time   Pt. Education y  Pt education on examination findings, progress towards goals, PT POC, and HEP    Update HEP y  Added core strength to HEP    PN Y                     Total Time for Session 53-67 Minutes Group CPT 74340   Therapeutic Exercise  CPT 40681 Done Today Sets/Reps Comments Time          Recumbent Bike       Upright bike       Elliptical       Treadmill y 10:00 2.3 mph  3% incline                  Stretches:        Calf Stretch y :60 Has slantboard at home  Lvl 5    Adductor stretch n :30x3 SOS    Modified tequila y 3x:30 RLE; with SOS    Piriformis stretch y 2x:30 Pull towards opp shoulder and away    DKTC nv :5x10 Red ball    Standing side lunge y :30x2 Standing at // bars    PQS n 3x30s R only           Strengthening: (table/seated)       S/L Clams HEP 5'' 2x10 RTB    Reverse clam HEP 5'' 2x10 GTB    SLR with TAC       S/L Hip Add        S/L Hip Abd HEP x10     Bridge n 2x10     Bridge with Hip add n 5''x10      Bridge with TB hip adb n  n 5'' 1x10  5'' 1x6 RTB  ''    Bridge on Bosu with march p      TAC n 5''x5     Reverse deadbug n 5''x10     Modified deadbug n x10ea Start position: hooklying    TAC 90/90 heel taps  TAC w/ march  n    n X10ea    x10     Up,up-down,down    Bicycle kicks y x20ea Forward and reverse    Deadbug y 2x10     Bridge w/ march y x10     SL bridge p                    Strengthening: (standing)       Mini-squat N  n x10  x10 RTB around knees  Hip adductor ball squeeze    Front Planks HEP 2x30s Modified on knees    Side plank with rotation reach y x10     3 way hip kicks y x10ea GTB  Standing on elliptical    Side step nv X2 laps Red loop    Monster Walk nv X2 laps Red  loop    Hip hinge n 2x10     RDL y 2x10 13# KB    Single leg RDL y x10 No resistance  Reaching to cone    Goblet squat y 2x10 13# KB    Slider lunge P*      Pallof press y 2x10ea Black tubing  In staggered stance  With OH lift    Shoulder ext with march nv x10ea GTB    Evans's carry with marching y X3 laps With 18lb KB  *Pt has KB at home (lightest 18lbs)    PNF Chops/ lifts y x15ea Black tubing  Down= double  Up= single    Step ups (Fwd)       Step ups (lateral) nv 2x10 9'' step    Planked hip ext y 2x10 Red PB in chair             Total Time for Session Not performed Group CPT 64242   Re-evaluation  CPT 85658 Done Today Sets/Reps Comments Time   Re-evaluation

## 2024-12-05 NOTE — PROGRESS NOTES
Physical Therapy Visit    PT DAILY NOTE FOR OUTPATIENT THERAPY    Patient: Sharon Morales MRN: [id number]  : 1970 54 y.o.  Referring Physician: Damion Brown MD  Date of Visit: 2024    Certification Dates: 10/29/24 through 25    Diagnosis:   1. Adductor tendinitis of right hip    2. S/P tendon repair        Chief Complaints:  adductor pain, s/p tenotomy    Precautions:          TODAY'S VISIT    Time In Session:  Start Time: 1156  Stop Time: 1253  Time Calculation (min): 57 min   History/Vitals/Pain/Encounter Info - 24 1156          Injury History/Precautions/Daily Required Info    Document Type daily treatment     Primary Therapist Jaz Martel     Chief Complaint/Reason for Visit  adductor pain, s/p tenotomy     Referring Physician Damion Brown MD     History of present illness/functional impairment Pt reports R hip pain beginning in 2024 and is arriving with diagnosis of adductor strain s/p tenotomy performed on 10/10/24. Symptoms described to occur through anterior hip, groin, and upper thigh pain. During ILDEFONSO, pt describes increase in pain the night following resistance-based exercise workout. Two days later she attended a bar class which significantly aggravated pain. Pt visited ortho in August who diagnosed her with a groin tear. She was instructed to rest and only walk on the treadmill or swim to tolerance. Pt followed these instructions, however pain continued to gradually worsen with activity to the point where she could not stand or walk and was admitted to the ED on 24. Pt returned to ortho who recommended tenotomy occurring about 2.5 weeks ago. Pt required to rest following procedure for 2 weeks with no lifting, only light walking. Pt still having similar pain post procedure compared to prior level. Pt has difficulty with bending, lifting, squatting, rising from a chair, stair negotiation, getting in/out of bath, and getting in/out of a car. Pt had f/u with ortho  “a few days ago” who recommended PT for eval and tx.   Significant PMHx includes: B/L knee replacement when she was 46yo, required revision for L one year later, and R the year after that; L hip replacement about 2 years ago (2022). Pt notes having gait deviations since her R knee replacement years ago which may have contributed to her symptoms.     Patient/Family/Caregiver Comments/Observations Pt with no complaints of pain when arriving to PT. She tried threadmill walking on incline and resistance based exercises this week with no complaints.     Patient reported fall since last visit No        Pain Assessment    Currently in pain No/Denies                    Daily Treatment Assessment and Plan - 12/05/24 1156          Daily Treatment Assessment and Plan    Progress toward goals Progressing     Daily Outcome Summary Pt responding well to recently progressed HEP to include resistance based activities. Reviewed core exercises from previous visit and provided pt with updated HEP handout. Pt introduced to DL and SL RDL, side planks, and marching bridges. Cues required for correct form. Pt feels slight knee discomfort R>L with SL RDL however symptoms are to tolerance and subside quickly with rest.     Plan and Recommendations Provide pt with updated HEP which includes core nv (not yet printed: modifeid plank, chops/lifts, OH pallof press, and fwd/reverse bicylce); Assess response to clearance to return to walking on TM incline and UE/LE gentle weight lifting at gym; Progress as tolerated towards POC                         OBJECTIVE DATA TAKEN TODAY:    None taken    Today's Treatment:    Diagnosis       R Hip adductor strain s/p tenotomy Sharon Carmen   Precautions:   Eval: 10/29  PN: 11/29  PN: 12/27  Re-eval: 1/21  POC till: 1/21      Outcome Measure:  WOMAC     Total Time for Session Not performed Group CPT 74237   Modalities  CPT HP/CP 63148 Done Today Sets/Reps Comments Time   MHP       CP       Vaso                Total Time for Session Not performed Group CPT 46343   Gait Training  CPT 44036 Done Today Sets/Reps Comments Time                  Total Time for Session Not performed Group CPT 26757   Manual Therapy  CPT 43882 Done Today Sets/Reps Comments Time   STM   R proximal quads, hip adductors    IASTM   Hand held roller to proximal quads    Jt mobs       LAD y   LE [id number]     Total Time for Session Not performed Group CPT 15935   Nueromuscular Re-education  CPT 70950 Done Today Sets/Reps Comments Time   SLS       Tandem stance                      Total Time for Session Not performed Group CPT 99178   Therapeutic Activity  CPT 18990 Done Today Sets/Reps Comments Time   Pt. Education y  Pt education on examination findings, progress towards goals, PT POC, and HEP    Update HEP y  Added core strength to HEP    PN Y                     Total Time for Session 53-67 Minutes Group CPT 50452   Therapeutic Exercise  CPT 82263 Done Today Sets/Reps Comments Time          Recumbent Bike       Upright bike       Elliptical       Treadmill y 10:00 2.3 mph  3% incline                  Stretches:        Calf Stretch y :60 Has slantboard at home  Lvl 5    Adductor stretch n :30x3 SOS    Modified tequila y 3x:30 RLE; with SOS    Piriformis stretch y 2x:30 Pull towards opp shoulder and away    DKTC nv :5x10 Red ball    Standing side lunge y :30x2 Standing at // bars    PQS n 3x30s R only           Strengthening: (table/seated)       S/L Clams HEP 5'' 2x10 RTB    Reverse clam HEP 5'' 2x10 GTB    SLR with TAC       S/L Hip Add        S/L Hip Abd HEP x10     Bridge n 2x10     Bridge with Hip add n 5''x10     Bridge with TB hip adb n  n 5'' 1x10  5'' 1x6 RTB  ''    Bridge on Bosu with march p      TAC n 5''x5     Reverse deadbug n 5''x10     Modified deadbug n x10ea Start position: hooklying    TAC 90/90 heel taps  TAC w/ march  n    n X10ea    x10     Up,up-down,down    Bicycle kicks y x20ea Forward and reverse    Deadbug y 2x10     Bridge  w/ march y x10     SL bridge p                    Strengthening: (standing)       Mini-squat N  n x10  x10 RTB around knees  Hip adductor ball squeeze    Front Planks HEP 2x30s Modified on knees    Side plank with rotation reach y x10     3 way hip kicks y x10ea GTB  Standing on elliptical    Side step nv X2 laps Red loop    Monster Walk nv X2 laps Red  loop    Hip hinge n 2x10     RDL y 2x10 13# KB    Single leg RDL y x10 No resistance  Reaching to cone    Goblet squat y 2x10 13# KB    Slider lunge P*      Pallof press y 2x10ea Black tubing  In staggered stance  With OH lift    Shoulder ext with march nv x10ea GTB    Evans's carry with marching y X3 laps With 18lb KB  *Pt has KB at home (lightest 18lbs)    PNF Chops/ lifts y x15ea Black tubing  Down= double  Up= single    Step ups (Fwd)       Step ups (lateral) nv 2x10 9'' step    Planked hip ext y 2x10 Red PB in chair             Total Time for Session Not performed Group CPT 90352   Re-evaluation  CPT 68139 Done Today Sets/Reps Comments Time   Re-evaluation

## 2024-12-09 ENCOUNTER — HOSPITAL ENCOUNTER (OUTPATIENT)
Dept: PHYSICAL THERAPY | Age: 54
Setting detail: THERAPIES SERIES
Discharge: HOME | End: 2024-12-09
Attending: FAMILY MEDICINE
Payer: COMMERCIAL

## 2024-12-09 DIAGNOSIS — M76.891 ADDUCTOR TENDINITIS OF RIGHT HIP: Primary | ICD-10-CM

## 2024-12-09 DIAGNOSIS — Z98.890 S/P TENDON REPAIR: ICD-10-CM

## 2024-12-09 PROCEDURE — 97110 THERAPEUTIC EXERCISES: CPT | Mod: GP,CQ

## 2024-12-09 NOTE — OP PT TREATMENT LOG
Diagnosis       R Hip adductor strain s/p tenotomy Sharon Carmen   Precautions:   Eval: 10/29  PN: 11/29  PN: 12/27  Re-eval: 1/21  POC till: 1/21      Outcome Measure:  WOMAC     Total Time for Session Not performed Group CPT 85318   Modalities  CPT HP/CP 33030 Done Today Sets/Reps Comments Time   MHP       CP       Vaso               Total Time for Session Not performed Group CPT 87849   Gait Training  CPT 78367 Done Today Sets/Reps Comments Time                  Total Time for Session Not performed Group CPT 57045   Manual Therapy  CPT 40245 Done Today Sets/Reps Comments Time   STM   R proximal quads, hip adductors    IASTM   Hand held roller to proximal quads    Jt mobs       LAD y   LE [id number]     Total Time for Session Not performed Group CPT 01916   Nueromuscular Re-education  CPT 33653 Done Today Sets/Reps Comments Time   SLS       Tandem stance                      Total Time for Session Not performed Group CPT 34374   Therapeutic Activity  CPT 91040 Done Today Sets/Reps Comments Time   Pt. Education y  Pt education on examination findings, progress towards goals, PT POC, and HEP    Update HEP y  Added core strength to HEP    PN Y                     Total Time for Session 53-67 Minutes Group CPT 30597   Therapeutic Exercise  CPT 60311 Done Today Sets/Reps Comments Time          Recumbent Bike       Upright bike       Elliptical       Treadmill y 10:00 2.3 mph  3% incline                  Stretches:        Calf Stretch HEP :60 Has slantboard at home  Lvl 5    Adductor stretch n :30x3 SOS    Modified tequila N 3x:30 RLE; with SOS    Piriformis stretch N 2x:30 Pull towards opp shoulder and away    DKTC nv :5x10 Red ball    Standing side lunge N :30x2 Standing at // bars    PQS n 3x30s R only           Strengthening: (table/seated)       S/L Clams HEP 5'' 2x10 RTB    Reverse clam HEP 5'' 2x10 GTB    SLR with TAC       S/L Hip Add        S/L Hip Abd HEP x10     Bridge n 2x10     Bridge with Hip add n 5''x10      Bridge with TB hip adb n  n 5'' 1x10  5'' 1x6 RTB  ''    Bridge on Bosu with march p      TAC n 5''x5     Reverse deadbug n 5''x10     Modified deadbug n x10ea Start position: hooklying    TAC 90/90 heel taps  TAC w/ march  n    n X10ea    x10     Up,up-down,down    Bicycle kicks n x20ea Forward and reverse    Deadbug y 2x10     Bridge w/ march y x10     SL bridge p                    Strengthening: (standing)       Mini-squat N  n x10  x10 RTB around knees  Hip adductor ball squeeze    Front Planks HEP 2x30s Modified on knees    Side plank with rotation reach y x10     3 way hip kicks y x10ea No band, on 1/2 roll     Side step y X2 laps GTB    Monster Walk y X2 laps GTB    Hip hinge n 2x10     RDL y 2x10 13# KB    Single leg RDL n x10 No resistance  Reaching to cone    Goblet squat y 2x10 13# KB    Slider lunge P*      Pallof press y 2x10ea Black tubing  In staggered stance  With OH lift    Shoulder ext with march y x10ea GTB    Farmer's carry with marching y X3 laps With 18lb KB  *Pt has KB at home (lightest 18lbs)    PNF Chops/ lifts n x15ea Black tubing  Down= double  Up= single    Step ups (Fwd) w/ hip flexion y 2x10 6 inch    Step ups (lateral) y 2x10 On BOSU    Planked hip ext n 2x10 Red PB in chair     Resisted walk away y x10ea 4 ways, CC 3.5 plates                   Total Time for Session Not performed Group CPT 71580   Re-evaluation  CPT 98508 Done Today Sets/Reps Comments Time   Re-evaluation

## 2024-12-09 NOTE — PROGRESS NOTES
Physical Therapy Visit    PT DAILY NOTE FOR OUTPATIENT THERAPY    Patient: Sharon Morales MRN: [id number]  : 1970 54 y.o.  Referring Physician: Damion Brown MD  Date of Visit: 2024    Certification Dates: 10/29/24 through 25    Diagnosis:   1. Adductor tendinitis of right hip    2. S/P tendon repair        Chief Complaints:  adductor pain, s/p tenotomy    Precautions:          TODAY'S VISIT    Time In Session:  Start Time: 1200  Stop Time: 1256  Time Calculation (min): 56 min   History/Vitals/Pain/Encounter Info - 24 1200          Injury History/Precautions/Daily Required Info    Document Type daily treatment     Primary Therapist Jaz Martel     Chief Complaint/Reason for Visit  adductor pain, s/p tenotomy     Referring Physician Damion Brown MD     History of present illness/functional impairment Pt reports R hip pain beginning in 2024 and is arriving with diagnosis of adductor strain s/p tenotomy performed on 10/10/24. Symptoms described to occur through anterior hip, groin, and upper thigh pain. During ILDEFONSO, pt describes increase in pain the night following resistance-based exercise workout. Two days later she attended a bar class which significantly aggravated pain. Pt visited ortho in August who diagnosed her with a groin tear. She was instructed to rest and only walk on the treadmill or swim to tolerance. Pt followed these instructions, however pain continued to gradually worsen with activity to the point where she could not stand or walk and was admitted to the ED on 24. Pt returned to ortho who recommended tenotomy occurring about 2.5 weeks ago. Pt required to rest following procedure for 2 weeks with no lifting, only light walking. Pt still having similar pain post procedure compared to prior level. Pt has difficulty with bending, lifting, squatting, rising from a chair, stair negotiation, getting in/out of bath, and getting in/out of a car. Pt had f/u with ortho  "“a few days ago” who recommended PT for eval and tx.   Significant PMHx includes: B/L knee replacement when she was 46yo, required revision for L one year later, and R the year after that; L hip replacement about 2 years ago (2022). Pt notes having gait deviations since her R knee replacement years ago which may have contributed to her symptoms.     Patient/Family/Caregiver Comments/Observations Sharon offers no major changes since last session and no pain to report. She just came from the [facility] after having swam 40+ minutes feeling \"already warmed up and stretched out\".     Patient reported fall since last visit No        Pain Assessment    Currently in pain No/Denies                    Daily Treatment Assessment and Plan - 12/09/24 1200          Daily Treatment Assessment and Plan    Progress toward goals Progressing     Daily Outcome Summary Added resisted walk aways in 4 directions today and progressed to BOSU lateral stepovers. Sharon was apprehensive at first but was able to gain more confidence as she continued to try. No report of increased pain with her exercise progressions.     Plan and Recommendations Provide pt with updated HEP which includes core nv (not yet printed: modifeid plank, chops/lifts, OH pallof press, and fwd/reverse bicylce); Assess response to clearance to return to walking on TM incline and UE/LE gentle weight lifting at gym; Progress as tolerated towards POC                         OBJECTIVE DATA TAKEN TODAY:    Today's Treatment:    Diagnosis       R Hip adductor strain s/p tenotomy Sharon Morales   Precautions:   Eval: 10/29  PN: 11/29  PN: 12/27  Re-eval: 1/21  POC till: 1/21      Outcome Measure:  WOMAC     Total Time for Session Not performed Group CPT 98324   Modalities  CPT HP/CP 54880 Done Today Sets/Reps Comments Time   MHP       CP       Vaso               Total Time for Session Not performed Group CPT 35964   Gait Training  CPT 88124 Done Today Sets/Reps Comments Time                  " Total Time for Session Not performed Group CPT 92220   Manual Therapy  CPT 68366 Done Today Sets/Reps Comments Time   STM   R proximal quads, hip adductors    IASTM   Hand held roller to proximal quads    Jt mobs       LAD y   LE [id number]     Total Time for Session Not performed Group CPT 06724   Nueromuscular Re-education  CPT 75135 Done Today Sets/Reps Comments Time   SLS       Tandem stance                      Total Time for Session Not performed Group CPT 81872   Therapeutic Activity  CPT 77159 Done Today Sets/Reps Comments Time   Pt. Education y  Pt education on examination findings, progress towards goals, PT POC, and HEP    Update HEP y  Added core strength to HEP    PN Y                     Total Time for Session 53-67 Minutes Group CPT 52116   Therapeutic Exercise  CPT 97885 Done Today Sets/Reps Comments Time          Recumbent Bike       Upright bike       Elliptical       Treadmill y 10:00 2.3 mph  3% incline                  Stretches:        Calf Stretch HEP :60 Has slantboard at home  Lvl 5    Adductor stretch n :30x3 SOS    Modified tequila N 3x:30 RLE; with SOS    Piriformis stretch N 2x:30 Pull towards opp shoulder and away    DKTC nv :5x10 Red ball    Standing side lunge N :30x2 Standing at // bars    PQS n 3x30s R only           Strengthening: (table/seated)       S/L Clams HEP 5'' 2x10 RTB    Reverse clam HEP 5'' 2x10 GTB    SLR with TAC       S/L Hip Add        S/L Hip Abd HEP x10     Bridge n 2x10     Bridge with Hip add n 5''x10     Bridge with TB hip adb n  n 5'' 1x10  5'' 1x6 RTB  ''    Bridge on Bosu with march p      TAC n 5''x5     Reverse deadbug n 5''x10     Modified deadbug n x10ea Start position: hooklying    TAC 90/90 heel taps  TAC w/ march  n    n X10ea    x10     Up,up-down,down    Bicycle kicks n x20ea Forward and reverse    Deadbug y 2x10     Bridge w/ march y x10     SL bridge p                    Strengthening: (standing)       Mini-squat N  n x10  x10 RTB around knees  Hip  adductor ball squeeze    Front Planks HEP 2x30s Modified on knees    Side plank with rotation reach y x10     3 way hip kicks y x10ea No band, on 1/2 roll     Side step y X2 laps GTB    Monster Walk y X2 laps GTB    Hip hinge n 2x10     RDL y 2x10 13# KB    Single leg RDL n x10 No resistance  Reaching to cone    Goblet squat y 2x10 13# KB    Slider lunge P*      Pallof press y 2x10ea Black tubing  In staggered stance  With OH lift    Shoulder ext with march y x10ea GTB    Farmer's carry with marching y X3 laps With 18lb KB  *Pt has KB at home (lightest 18lbs)    PNF Chops/ lifts n x15ea Black tubing  Down= double  Up= single    Step ups (Fwd) w/ hip flexion y 2x10 6 inch    Step ups (lateral) y 2x10 On BOSU    Planked hip ext n 2x10 Red PB in chair     Resisted walk away y x10ea 4 ways, CC 3.5 plates                   Total Time for Session Not performed Group CPT 34122   Re-evaluation  CPT 52969 Done Today Sets/Reps Comments Time   Re-evaluation

## 2024-12-13 ENCOUNTER — HOSPITAL ENCOUNTER (OUTPATIENT)
Dept: PHYSICAL THERAPY | Age: 54
Setting detail: THERAPIES SERIES
Discharge: HOME | End: 2024-12-13
Attending: FAMILY MEDICINE
Payer: COMMERCIAL

## 2024-12-13 DIAGNOSIS — Z98.890 S/P TENDON REPAIR: ICD-10-CM

## 2024-12-13 DIAGNOSIS — M76.891 ADDUCTOR TENDINITIS OF RIGHT HIP: Primary | ICD-10-CM

## 2024-12-13 PROCEDURE — 97110 THERAPEUTIC EXERCISES: CPT | Mod: GP,CQ

## 2024-12-13 NOTE — OP PT TREATMENT LOG
Diagnosis       R Hip adductor strain s/p tenotomy Sharon Carmen   Precautions:   Eval: 10/29  PN: 11/29  PN: 12/27  Re-eval: 1/21  POC till: 1/21      Outcome Measure:  WOMAC     Total Time for Session Not performed Group CPT 82847   Modalities  CPT HP/CP 66106 Done Today Sets/Reps Comments Time   MHP       CP       Vaso               Total Time for Session Not performed Group CPT 73261   Gait Training  CPT 78755 Done Today Sets/Reps Comments Time                  Total Time for Session Not performed Group CPT 84374   Manual Therapy  CPT 96879 Done Today Sets/Reps Comments Time   STM   R proximal quads, hip adductors    IASTM   Hand held roller to proximal quads    Jt mobs       LAD y   LE [id number]     Total Time for Session Not performed Group CPT 93013   Nueromuscular Re-education  CPT 32798 Done Today Sets/Reps Comments Time   SLS       Tandem stance                      Total Time for Session Not performed Group CPT 35159   Therapeutic Activity  CPT 78474 Done Today Sets/Reps Comments Time   Pt. Education y  Pt education on examination findings, progress towards goals, PT POC, and HEP    Update HEP y  Added core strength to HEP    PN Y                     Total Time for Session 53-67 Minutes Group CPT 48005   Therapeutic Exercise  CPT 30256 Done Today Sets/Reps Comments Time          Recumbent Bike       Upright bike       Elliptical       Treadmill y 10:00 2.3 mph  3% incline                  Stretches:        Calf Stretch HEP :60 Has slantboard at home  Lvl 5    Adductor stretch n :30x3 SOS    Modified tequila N 3x:30 RLE; with SOS    Piriformis stretch N 2x:30 Pull towards opp shoulder and away    DKTC y :5x20 Red ball    Standing side lunge N :30x2 Standing at // bars    PQS n 3x30s R only           Strengthening: (table/seated)       S/L Clams HEP 5'' 2x10 RTB    Reverse clam HEP 5'' 2x10 GTB    SLR with TAC       S/L Hip Add        S/L Hip Abd HEP x10     Bridge n 2x10     Bridge with Hip add n 5''x10      Bridge with TB hip adb n  n 5'' 1x10  5'' 1x6 RTB  ''    Bridge on Bosu with march p      TAC n 5''x5     Reverse deadbug n 5''x10     Modified deadbug y x20ea With PB    TAC 90/90 heel taps  TAC w/ march  y    n X10ea    x10     Up,up-down,down    Bicycle kicks n x20ea Forward and reverse    Deadbug  2x10     Bridge w/ march  x10     SL bridge p                    Strengthening: (standing)       Mini-squat N  n x10  x10 RTB around knees  Hip adductor ball squeeze    Front Planks HEP 2x30s Modified on knees    Side plank with rotation reach  x10     3 way hip kicks  x10ea No band, on 1/2 roll     Side step y X2 laps GTB    Monster Walk y X2 laps GTB    Hip hinge n 2x10     RDL y 2x10 B 13# KB  U 13# KB FR for balance    Single leg RDL n x10 No resistance  Reaching to cone    Goblet squat y 2x10 13# KB    Slider lunge P*      Pallof press y 2x10ea Black tubing  In staggered stance  With OH lift    Shoulder ext with march y x10ea Black tubing    Farmer's carry with marching y X3 laps With 18lb KB  *Pt has KB at home (lightest 18lbs)    PNF Chops/ lifts n x15ea Black tubing  Down= double  Up= single    Step ups (Fwd) w/ hip flexion y 2x10 6 inch    Step ups (lateral) n 2x10 On BOSU    Planked hip ext n 2x10 Red PB in chair     Resisted walk away y x10ea 4 ways, CC 3.5 plates                   Total Time for Session Not performed Group CPT 52634   Re-evaluation  CPT 85775 Done Today Sets/Reps Comments Time   Re-evaluation

## 2024-12-13 NOTE — PROGRESS NOTES
Physical Therapy Visit    PT DAILY NOTE FOR OUTPATIENT THERAPY    Patient: Sharon Morales MRN: [id number]  : 1970 54 y.o.  Referring Physician: Damion Brown MD  Date of Visit: 2024    Certification Dates: 10/29/24 through 25    Diagnosis:   1. Adductor tendinitis of right hip    2. S/P tendon repair        Chief Complaints:  adductor pain, s/p tenotomy    Precautions:          TODAY'S VISIT    Time In Session:  Start Time: 1305  Stop Time: 1405  Time Calculation (min): 60 min   History/Vitals/Pain/Encounter Info - 24 1305          Injury History/Precautions/Daily Required Info    Document Type daily treatment     Primary Therapist Jaz Martel     Chief Complaint/Reason for Visit  adductor pain, s/p tenotomy     Referring Physician Damion Brown MD     History of present illness/functional impairment Pt reports R hip pain beginning in 2024 and is arriving with diagnosis of adductor strain s/p tenotomy performed on 10/10/24. Symptoms described to occur through anterior hip, groin, and upper thigh pain. During ILDEFONSO, pt describes increase in pain the night following resistance-based exercise workout. Two days later she attended a bar class which significantly aggravated pain. Pt visited ortho in August who diagnosed her with a groin tear. She was instructed to rest and only walk on the treadmill or swim to tolerance. Pt followed these instructions, however pain continued to gradually worsen with activity to the point where she could not stand or walk and was admitted to the ED on 24. Pt returned to ortho who recommended tenotomy occurring about 2.5 weeks ago. Pt required to rest following procedure for 2 weeks with no lifting, only light walking. Pt still having similar pain post procedure compared to prior level. Pt has difficulty with bending, lifting, squatting, rising from a chair, stair negotiation, getting in/out of bath, and getting in/out of a car. Pt had f/u with  ortho “a few days ago” who recommended PT for eval and tx.   Significant PMHx includes: B/L knee replacement when she was 46yo, required revision for L one year later, and R the year after that; L hip replacement about 2 years ago (2022). Pt notes having gait deviations since her R knee replacement years ago which may have contributed to her symptoms.     Patient/Family/Caregiver Comments/Observations Increased stiffness in hip today.     Patient reported fall since last visit No        Pain Assessment    Currently in pain No/Denies                    Daily Treatment Assessment and Plan - 12/13/24 1305          Daily Treatment Assessment and Plan    Progress toward goals Progressing     Daily Outcome Summary Pt. reports she did a lot of sitting yesterday and had pain in hips when she was laying in bed trying to fall asleep. Started session with active warm up on TM and TE for Strength, ROM and dynamic balance as noted on log. Pt. appropriately challenged with TE. No c/o pain in hips during or after session.     Plan and Recommendations Continue to progress towards established goals                         OBJECTIVE DATA TAKEN TODAY:    None taken    Today's Treatment:    Diagnosis       R Hip adductor strain s/p tenotomy Sharon Carmen   Precautions:   Eval: 10/29  PN: 11/29  PN: 12/27  Re-eval: 1/21  POC till: 1/21      Outcome Measure:  WOMAC     Total Time for Session Not performed Group CPT 97974   Modalities  CPT HP/CP 84074 Done Today Sets/Reps Comments Time   MHP       CP       Vaso               Total Time for Session Not performed Group CPT 48834   Gait Training  CPT 19725 Done Today Sets/Reps Comments Time                  Total Time for Session Not performed Group CPT 71086   Manual Therapy  CPT 56844 Done Today Sets/Reps Comments Time   STM   R proximal quads, hip adductors    IASTM   Hand held roller to proximal quads    Jt mobs       LAD y   LE [id number]     Total Time for Session Not performed Group CPT  54867   Nueromuscular Re-education  CPT 38867 Done Today Sets/Reps Comments Time   SLS       Tandem stance                      Total Time for Session Not performed Group CPT 88129   Therapeutic Activity  CPT 47569 Done Today Sets/Reps Comments Time   Pt. Education y  Pt education on examination findings, progress towards goals, PT POC, and HEP    Update HEP y  Added core strength to HEP    PN Y                     Total Time for Session 53-67 Minutes Group CPT 98310   Therapeutic Exercise  CPT 98644 Done Today Sets/Reps Comments Time          Recumbent Bike       Upright bike       Elliptical       Treadmill y 10:00 2.3 mph  3% incline                  Stretches:        Calf Stretch HEP :60 Has slantboard at home  Lvl 5    Adductor stretch n :30x3 SOS    Modified tequila N 3x:30 RLE; with SOS    Piriformis stretch N 2x:30 Pull towards opp shoulder and away    DKTC y :5x20 Red ball    Standing side lunge N :30x2 Standing at // bars    PQS n 3x30s R only           Strengthening: (table/seated)       S/L Clams HEP 5'' 2x10 RTB    Reverse clam HEP 5'' 2x10 GTB    SLR with TAC       S/L Hip Add        S/L Hip Abd HEP x10     Bridge n 2x10     Bridge with Hip add n 5''x10     Bridge with TB hip adb n  n 5'' 1x10  5'' 1x6 RTB  ''    Bridge on Bosu with march p      TAC n 5''x5     Reverse deadbug n 5''x10     Modified deadbug y x20ea With PB    TAC 90/90 heel taps  TAC w/ march  y    n X10ea    x10     Up,up-down,down    Bicycle kicks n x20ea Forward and reverse    Deadbug  2x10     Bridge w/ march  x10     SL bridge p                    Strengthening: (standing)       Mini-squat N  n x10  x10 RTB around knees  Hip adductor ball squeeze    Front Planks HEP 2x30s Modified on knees    Side plank with rotation reach  x10     3 way hip kicks  x10ea No band, on 1/2 roll     Side step y X2 laps GTB    Monster Walk y X2 laps GTB    Hip hinge n 2x10     RDL y 2x10 B 13# KB  U 13# KB FR for balance    Single leg RDL n x10 No  resistance  Reaching to cone    Goblet squat y 2x10 13# KB    Slider lunge P*      Pallof press y 2x10ea Black tubing  In staggered stance  With OH lift    Shoulder ext with march y x10ea Black tubing    Farmer's carry with marching y X3 laps With 18lb KB  *Pt has KB at home (lightest 18lbs)    PNF Chops/ lifts n x15ea Black tubing  Down= double  Up= single    Step ups (Fwd) w/ hip flexion y 2x10 6 inch    Step ups (lateral) n 2x10 On BOSU    Planked hip ext n 2x10 Red PB in chair     Resisted walk away y x10ea 4 ways, CC 3.5 plates                   Total Time for Session Not performed Group CPT 48191   Re-evaluation  CPT 78828 Done Today Sets/Reps Comments Time   Re-evaluation

## 2024-12-17 ENCOUNTER — HOSPITAL ENCOUNTER (OUTPATIENT)
Dept: PHYSICAL THERAPY | Age: 54
Setting detail: THERAPIES SERIES
Discharge: HOME | End: 2024-12-17
Attending: FAMILY MEDICINE
Payer: COMMERCIAL

## 2024-12-17 DIAGNOSIS — M76.891 ADDUCTOR TENDINITIS OF RIGHT HIP: Primary | ICD-10-CM

## 2024-12-17 DIAGNOSIS — Z98.890 S/P TENDON REPAIR: ICD-10-CM

## 2024-12-17 PROCEDURE — 97110 THERAPEUTIC EXERCISES: CPT | Mod: GP,CQ

## 2024-12-17 NOTE — PROGRESS NOTES
Physical Therapy Visit    PT DAILY NOTE FOR OUTPATIENT THERAPY    Patient: Sharon Morales MRN: [id number]  : 1970 54 y.o.  Referring Physician: Damion Brown MD  Date of Visit: 2024    Certification Dates: 10/29/24 through 25    Diagnosis:   1. Adductor tendinitis of right hip    2. S/P tendon repair        Chief Complaints:  adductor pain, s/p tenotomy    Precautions:          TODAY'S VISIT    Time In Session:      History/Vitals/Pain/Encounter Info - 24 1233          Injury History/Precautions/Daily Required Info    Document Type daily treatment     Primary Therapist Jaz Martel     Chief Complaint/Reason for Visit  adductor pain, s/p tenotomy     Referring Physician Damion Brown MD     History of present illness/functional impairment Pt reports R hip pain beginning in 2024 and is arriving with diagnosis of adductor strain s/p tenotomy performed on 10/10/24. Symptoms described to occur through anterior hip, groin, and upper thigh pain. During ILDEFONSO, pt describes increase in pain the night following resistance-based exercise workout. Two days later she attended a bar class which significantly aggravated pain. Pt visited ortho in August who diagnosed her with a groin tear. She was instructed to rest and only walk on the treadmill or swim to tolerance. Pt followed these instructions, however pain continued to gradually worsen with activity to the point where she could not stand or walk and was admitted to the ED on 24. Pt returned to ortho who recommended tenotomy occurring about 2.5 weeks ago. Pt required to rest following procedure for 2 weeks with no lifting, only light walking. Pt still having similar pain post procedure compared to prior level. Pt has difficulty with bending, lifting, squatting, rising from a chair, stair negotiation, getting in/out of bath, and getting in/out of a car. Pt had f/u with ortho “a few days ago” who recommended PT for eval and tx.    Significant PMHx includes: B/L knee replacement when she was 44yo, required revision for L one year later, and R the year after that; L hip replacement about 2 years ago (2022). Pt notes having gait deviations since her R knee replacement years ago which may have contributed to her symptoms.     Patient/Family/Caregiver Comments/Observations Pt. reports that she contiues to have stiffness in B hips R>L.     Patient reported fall since last visit No        Pain Assessment    Currently in pain No/Denies                    Daily Treatment Assessment and Plan - 12/17/24 1245          Daily Treatment Assessment and Plan    Progress toward goals Progressing     Daily Outcome Summary Started session on TM for active warm up x 10 min. Review of stretch for hip flexor in 1/2 kneel and TE for B LE strengthening as noted on log. Added hip hikes and advanced hip 3 way to stance leg on 1/2 FR. Pt. reports less stiffness B hips at end of session.     Plan and Recommendations Continue to progress towards established goals                         OBJECTIVE DATA TAKEN TODAY:    None taken    Today's Treatment:    Diagnosis       R Hip adductor strain s/p tenotomy Sharon Carmen   Precautions:   Eval: 10/29  PN: 11/29  PN: 12/27  Re-eval: 1/21  POC till: 1/21      Outcome Measure:  WOMAC     Total Time for Session Not performed Group CPT 07900   Modalities  CPT HP/CP 76999 Done Today Sets/Reps Comments Time   MHP       CP       Vaso               Total Time for Session Not performed Group CPT 19705   Gait Training  CPT 30466 Done Today Sets/Reps Comments Time                  Total Time for Session Not performed Group CPT 33122   Manual Therapy  CPT 19894 Done Today Sets/Reps Comments Time   STM   R proximal quads, hip adductors    IASTM   Hand held roller to proximal quads    Jt mobs       LAD y   LE [id number]     Total Time for Session Not performed Group CPT 39682   Nueromuscular Re-education  CPT 34227 Done Today Sets/Reps Comments  Time   SLS       Tandem stance                      Total Time for Session Not performed Group CPT 23622   Therapeutic Activity  CPT 53609 Done Today Sets/Reps Comments Time   Pt. Education y  Pt education on examination findings, progress towards goals, PT POC, and HEP    Update HEP y  Added core strength to HEP    PN Y                     Total Time for Session 53-67 Minutes Group CPT 75613   Therapeutic Exercise  CPT 58422 Done Today Sets/Reps Comments Time          Recumbent Bike       Upright bike       Elliptical       Treadmill y 10:00 2.3 mph  3% incline                  Stretches:        Calf Stretch HEP :60 Has slantboard at home  Lvl 5    Adductor stretch n :30x3 SOS    Modified tequila N 3x:30 RLE; with SOS    Piriformis stretch N 2x:30 Pull towards opp shoulder and away    DKTC n :5x20 Red ball    Standing side lunge N :30x2 Standing at // bars    PQS n 3x30s R only           Strengthening: (table/seated)       S/L Clams HEP 5'' 2x10 RTB    Reverse clam HEP 5'' 2x10 GTB    SLR with TAC       S/L Hip Add        S/L Hip Abd HEP x10     Bridge n 2x10     Bridge with Hip add n 5''x10     Bridge with TB hip adb n  n 5'' 1x10  5'' 1x6 RTB  ''    Bridge on Bosu with march p      TAC n 5''x5     Reverse deadbug n 5''x10     Modified deadbug nv x20ea With PB    TAC 90/90 heel taps  TAC w/ march  nv    n X10ea    x10     Up,up-down,down    Bicycle kicks n x20ea Forward and reverse    Deadbug  2x10     Bridge w/ march  x10     SL bridge p                    Strengthening: (standing)       Mini-squat N  n x10  x10 RTB around knees  Hip adductor ball squeeze    Front Planks HEP 2x30s Modified on knees    Side plank with rotation reach  x10     3 way hip kicks y x10ea No band, on 1/2 roll     Side step nv X2 laps GTB    Monster Walk nv X2 laps GTB    Hip hinge n 2x10     Hip hike y X15 each Wood block    RDL n 2x10 B 13# KB  U 13# KB FR for balance    Single leg RDL n x10 No resistance  Reaching to cone    Goblet  squat y 2x10 13# KB    Slider lunge y X10 each Side and rev    Pallof press y 2x10ea Black tubing  In staggered stance  With OH lift    Shoulder ext with march nv x10ea Black tubing    Farmer's carry with marching y X3 laps With 18lb KB  *Pt has KB at home (lightest 18lbs)    PNF Chops/ lifts y x15ea Black tubing  Down= double  Up= single    Step ups (Fwd) w/ hip flexion nv 2x10 6 inch    Step ups (lateral) y 2x10 On BOSU    Planked hip ext y 2x10 Red PB in chair     Resisted walk away y x10ea 4 ways, CC 3.5 plates                   Total Time for Session Not performed Group CPT 93558   Re-evaluation  CPT 15856 Done Today Sets/Reps Comments Time   Re-evaluation

## 2024-12-17 NOTE — OP PT TREATMENT LOG
Diagnosis       R Hip adductor strain s/p tenotomy Sharon Carmen   Precautions:   Eval: 10/29  PN: 11/29  PN: 12/27  Re-eval: 1/21  POC till: 1/21      Outcome Measure:  WOMAC     Total Time for Session Not performed Group CPT 44933   Modalities  CPT HP/CP 00370 Done Today Sets/Reps Comments Time   MHP       CP       Vaso               Total Time for Session Not performed Group CPT 37718   Gait Training  CPT 68523 Done Today Sets/Reps Comments Time                  Total Time for Session Not performed Group CPT 80986   Manual Therapy  CPT 98666 Done Today Sets/Reps Comments Time   STM   R proximal quads, hip adductors    IASTM   Hand held roller to proximal quads    Jt mobs       LAD y   LE [id number]     Total Time for Session Not performed Group CPT 51221   Nueromuscular Re-education  CPT 56423 Done Today Sets/Reps Comments Time   SLS       Tandem stance                      Total Time for Session Not performed Group CPT 68995   Therapeutic Activity  CPT 58831 Done Today Sets/Reps Comments Time   Pt. Education y  Pt education on examination findings, progress towards goals, PT POC, and HEP    Update HEP y  Added core strength to HEP    PN Y                     Total Time for Session 53-67 Minutes Group CPT 97637   Therapeutic Exercise  CPT 16967 Done Today Sets/Reps Comments Time          Recumbent Bike       Upright bike       Elliptical       Treadmill y 10:00 2.3 mph  3% incline                  Stretches:        Calf Stretch HEP :60 Has slantboard at home  Lvl 5    Adductor stretch n :30x3 SOS    Modified tequila N 3x:30 RLE; with SOS    Piriformis stretch N 2x:30 Pull towards opp shoulder and away    DKTC n :5x20 Red ball    Standing side lunge N :30x2 Standing at // bars    PQS n 3x30s R only           Strengthening: (table/seated)       S/L Clams HEP 5'' 2x10 RTB    Reverse clam HEP 5'' 2x10 GTB    SLR with TAC       S/L Hip Add        S/L Hip Abd HEP x10     Bridge n 2x10     Bridge with Hip add n 5''x10      Bridge with TB hip adb n  n 5'' 1x10  5'' 1x6 RTB  ''    Bridge on Bosu with march p      TAC n 5''x5     Reverse deadbug n 5''x10     Modified deadbug nv x20ea With PB    TAC 90/90 heel taps  TAC w/ march  nv    n X10ea    x10     Up,up-down,down    Bicycle kicks n x20ea Forward and reverse    Deadbug  2x10     Bridge w/ march  x10     SL bridge p                    Strengthening: (standing)       Mini-squat N  n x10  x10 RTB around knees  Hip adductor ball squeeze    Front Planks HEP 2x30s Modified on knees    Side plank with rotation reach  x10     3 way hip kicks y x10ea No band, on 1/2 roll     Side step nv X2 laps GTB    Monster Walk nv X2 laps GTB    Hip hinge n 2x10     Hip hike y X15 each Wood block    RDL n 2x10 B 13# KB  U 13# KB FR for balance    Single leg RDL n x10 No resistance  Reaching to cone    Goblet squat y 2x10 13# KB    Slider lunge y X10 each Side and rev    Pallof press y 2x10ea Black tubing  In staggered stance  With OH lift    Shoulder ext with march nv x10ea Black tubing    Farmer's carry with marching y X3 laps With 18lb KB  *Pt has KB at home (lightest 18lbs)    PNF Chops/ lifts y x15ea Black tubing  Down= double  Up= single    Step ups (Fwd) w/ hip flexion nv 2x10 6 inch    Step ups (lateral) y 2x10 On BOSU    Planked hip ext y 2x10 Red PB in chair     Resisted walk away y x10ea 4 ways, CC 3.5 plates                   Total Time for Session Not performed Group CPT 48567   Re-evaluation  CPT 84256 Done Today Sets/Reps Comments Time   Re-evaluation

## 2024-12-19 ENCOUNTER — HOSPITAL ENCOUNTER (OUTPATIENT)
Dept: PHYSICAL THERAPY | Age: 54
Setting detail: THERAPIES SERIES
Discharge: HOME | End: 2024-12-19
Attending: FAMILY MEDICINE
Payer: COMMERCIAL

## 2024-12-19 DIAGNOSIS — Z98.890 S/P TENDON REPAIR: ICD-10-CM

## 2024-12-19 DIAGNOSIS — M76.891 ADDUCTOR TENDINITIS OF RIGHT HIP: Primary | ICD-10-CM

## 2024-12-19 PROCEDURE — 97110 THERAPEUTIC EXERCISES: CPT | Mod: GP

## 2024-12-19 NOTE — OP PT TREATMENT LOG
Diagnosis       R Hip adductor strain s/p tenotomy Sharon Carmen   Precautions:   Eval: 10/29  PN: 11/29  PN: 12/27  Re-eval: 1/21  POC till: 1/21      Outcome Measure:  WOMAC     Total Time for Session Not performed Group CPT 59693   Modalities  CPT HP/CP 03854 Done Today Sets/Reps Comments Time   MHP       CP       Vaso               Total Time for Session Not performed Group CPT 83179   Gait Training  CPT 96962 Done Today Sets/Reps Comments Time                  Total Time for Session Not performed Group CPT 44806   Manual Therapy  CPT 31777 Done Today Sets/Reps Comments Time   STM   R proximal quads, hip adductors    IASTM   Hand held roller to proximal quads    Jt mobs       LAD y   LE [id number]     Total Time for Session Not performed Group CPT 67804   Nueromuscular Re-education  CPT 38434 Done Today Sets/Reps Comments Time   SLS       Tandem stance                      Total Time for Session Not performed Group CPT 37656   Therapeutic Activity  CPT 67384 Done Today Sets/Reps Comments Time   Pt. Education y  Pt education on examination findings, progress towards goals, PT POC, and HEP    Update HEP y  Added core strength to HEP    PN Y                     Total Time for Session 53-67 Minutes Group CPT 71138   Therapeutic Exercise  CPT 94514 Done Today Sets/Reps Comments Time          Recumbent Bike       Upright bike       Elliptical       Treadmill y 10:00 2.3 mph  3% incline                  Stretches:        Calf Stretch HEP :60 Has slantboard at home  Lvl 5    Adductor stretch n :30x3 SOS    Modified tequila N 3x:30 RLE; with SOS    Piriformis stretch N 2x:30 Pull towards opp shoulder and away    DKTC n :5x20 Red ball    Standing side lunge N :30x2 Standing at // bars    PQS n 3x30s R only    1/2 kneel HF stretch nv 2x30s            Strengthening: (table/seated)       S/L Clams HEP 5'' 2x10 RTB    Reverse clam HEP 5'' 2x10 GTB    SLR with TAC       S/L Hip Add        S/L Hip Abd HEP x10     Bridge n 2x10      Bridge with Hip add n 5''x10     Bridge with TB hip adb n  n 5'' 1x10  5'' 1x6 RTB  ''    Bridge on Bosu with march       TAC n 5''x5     Reverse deadbug n 5''x10     Modified deadbug nv x20ea With PB    TAC 90/90 heel taps  TAC w/ march  nv    n X10ea    x10     Up,up-down,down    Bicycle kicks n x20ea Forward and reverse    Deadbug  2x10     Bridge w/ march  x10     SL bridge                     Strengthening: (standing)       Mini-squat n  n x10  x10 RTB around knees  Hip adductor ball squeeze    Wall sit p      Front Planks HEP 2x30s Modified on knees    Side plank with rotation reach  x10     3 way hip kicks y x10ea No band, on 1/2 roll     Side step n X2 laps GTB    Monster Walk n X2 laps GTB    Hip hinge n 2x10     Hip hike y X15 each Wood block    RDL Y      n 2x10     B 13# KB  PLAN: 18# kb nv    U 13# KB FR for balance    Goblet squat y 2x10 18# KB    Slider lunge y X10 each Side and rev    Pallof press y 2x10ea Black tubing  With OH lift    Shoulder ext with march n x10ea Black tubing    Farmer's carry with marching y X3 laps With 18lb KB  *Pt has KB at home (lightest 18lbs)    Resisted march y x20 Red Band at feet    PNF Chops/ lifts y x15ea Black tubing  Down= double  Up= single    Step ups (Fwd) w/ hip flexion n 2x10 6 inch    Step ups (lateral) y 2x10 On BOSU    Planked hip ext y 2x10 Red PB in chair     Resisted walk away y x10ea 4 ways, CC 3.5 plates                   Total Time for Session Not performed Group CPT 52846   Re-evaluation  CPT 82109 Done Today Sets/Reps Comments Time   Re-evaluation

## 2024-12-19 NOTE — PROGRESS NOTES
Physical Therapy Visit    PT DAILY NOTE FOR OUTPATIENT THERAPY    Patient: Sharon Morales MRN: [id number]  : 1970 54 y.o.  Referring Physician: Damion Brown MD  Date of Visit: 2024    Certification Dates: 10/29/24 through 25    Diagnosis:   1. Adductor tendinitis of right hip    2. S/P tendon repair        Chief Complaints:  adductor pain, s/p tenotomy    Precautions:          TODAY'S VISIT    Time In Session:  Start Time: 1457  Stop Time: 1555  Time Calculation (min): 58 min   History/Vitals/Pain/Encounter Info - 24 1453          Injury History/Precautions/Daily Required Info    Document Type daily treatment     Primary Therapist Jaz Martel     Chief Complaint/Reason for Visit  adductor pain, s/p tenotomy     Referring Physician Damion Brown MD     History of present illness/functional impairment Pt reports R hip pain beginning in 2024 and is arriving with diagnosis of adductor strain s/p tenotomy performed on 10/10/24. Symptoms described to occur through anterior hip, groin, and upper thigh pain. During [facility], pt describes increase in pain the night following resistance-based exercise workout. Two days later she attended a bar class which significantly aggravated pain. Pt visited ortho in August who diagnosed her with a groin tear. She was instructed to rest and only walk on the treadmill or swim to tolerance. Pt followed these instructions, however pain continued to gradually worsen with activity to the point where she could not stand or walk and was admitted to the ED on 24. Pt returned to ortho who recommended tenotomy occurring about 2.5 weeks ago. Pt required to rest following procedure for 2 weeks with no lifting, only light walking. Pt still having similar pain post procedure compared to prior level. Pt has difficulty with bending, lifting, squatting, rising from a chair, stair negotiation, getting in/out of bath, and getting in/out of a car. Pt had f/u with  ortho “a few days ago” who recommended PT for eval and tx.   Significant PMHx includes: B/L knee replacement when she was 46yo, required revision for L one year later, and R the year after that; L hip replacement about 2 years ago (2022). Pt notes having gait deviations since her R knee replacement years ago which may have contributed to her symptoms.     Patient/Family/Caregiver Comments/Observations Pt having less frequent and intense R anterior hip pain. Pt pleased with how she is progressing.     Patient reported fall since last visit No        Pain Assessment    Currently in pain No/Denies                    Daily Treatment Assessment and Plan - 12/19/24 1453          Daily Treatment Assessment and Plan    Progress toward goals Progressing     Daily Outcome Summary Continued with progressions from previous visit with good carry over and minimal cues needed for correct form. Cues only required with slider lunges to sequence activity appropriately. Increased resistance for goblet squats to 18#. Pt completes therex without exacerbating symptoms. She demonstrates quick hip fatigue with glute med strengthening exercises. Discussed PT POC, with potential DC for next visit. Pt provided with updated HEP handout to include marching with TB and slider lunges. Pt demonstrates good understanding of instructions.     Plan and Recommendations Continue to progress towards established goals                         OBJECTIVE DATA TAKEN TODAY:    None taken    Today's Treatment:    Diagnosis       R Hip adductor strain s/p tenotomy Sharon Carmen   Precautions:   Eval: 10/29  PN: 11/29  PN: 12/27  Re-eval: 1/21  POC till: 1/21      Outcome Measure:  WOMAC     Total Time for Session Not performed Group CPT 14145   Modalities  CPT HP/CP 84506 Done Today Sets/Reps Comments Time   MHP       CP       Vaso               Total Time for Session Not performed Group CPT 25725   Gait Training  CPT 94042 Done Today Sets/Reps Comments Time                   Total Time for Session Not performed Group CPT 57176   Manual Therapy  CPT 28312 Done Today Sets/Reps Comments Time   STM   R proximal quads, hip adductors    IASTM   Hand held roller to proximal quads    Jt mobs       LAD y   LE [id number]     Total Time for Session Not performed Group CPT 08889   Nueromuscular Re-education  CPT 34067 Done Today Sets/Reps Comments Time   SLS       Tandem stance                      Total Time for Session Not performed Group CPT 60713   Therapeutic Activity  CPT 23386 Done Today Sets/Reps Comments Time   Pt. Education y  Pt education on examination findings, progress towards goals, PT POC, and HEP    Update HEP y  Added core strength to HEP    PN Y                     Total Time for Session 53-67 Minutes Group CPT 75313   Therapeutic Exercise  CPT 31252 Done Today Sets/Reps Comments Time          Recumbent Bike       Upright bike       Elliptical       Treadmill y 10:00 2.3 mph  3% incline                  Stretches:        Calf Stretch HEP :60 Has slantboard at home  Lvl 5    Adductor stretch n :30x3 SOS    Modified tequila N 3x:30 RLE; with SOS    Piriformis stretch N 2x:30 Pull towards opp shoulder and away    DKTC n :5x20 Red ball    Standing side lunge N :30x2 Standing at // bars    PQS n 3x30s R only    1/2 kneel HF stretch nv 2x30s            Strengthening: (table/seated)       S/L Clams HEP 5'' 2x10 RTB    Reverse clam HEP 5'' 2x10 GTB    SLR with TAC       S/L Hip Add        S/L Hip Abd HEP x10     Bridge n 2x10     Bridge with Hip add n 5''x10     Bridge with TB hip adb n  n 5'' 1x10  5'' 1x6 RTB  ''    Bridge on Bosu with march       TAC n 5''x5     Reverse deadbug n 5''x10     Modified deadbug nv x20ea With PB    TAC 90/90 heel taps  TAC w/ march  nv    n X10ea    x10     Up,up-down,down    Bicycle kicks n x20ea Forward and reverse    Deadbug  2x10     Bridge w/ march  x10     SL bridge                     Strengthening: (standing)       Mini-squat n  n x10  x10  RTB around knees  Hip adductor ball squeeze    Wall sit p      Front Planks HEP 2x30s Modified on knees    Side plank with rotation reach  x10     3 way hip kicks y x10ea No band, on 1/2 roll     Side step n X2 laps GTB    Monster Walk n X2 laps GTB    Hip hinge n 2x10     Hip hike y X15 each Wood block    RDL Y      n 2x10     B 13# KB  PLAN: 18# kb nv    U 13# KB FR for balance    Goblet squat y 2x10 18# KB    Slider lunge y X10 each Side and rev    Pallof press y 2x10ea Black tubing  With OH lift    Shoulder ext with march n x10ea Black tubing    Farmer's carry with marching y X3 laps With 18lb KB  *Pt has KB at home (lightest 18lbs)    Resisted march y x20 Red Band at feet    PNF Chops/ lifts y x15ea Black tubing  Down= double  Up= single    Step ups (Fwd) w/ hip flexion n 2x10 6 inch    Step ups (lateral) y 2x10 On BOSU    Planked hip ext y 2x10 Red PB in chair     Resisted walk away y x10ea 4 ways, CC 3.5 plates                   Total Time for Session Not performed Group CPT 62234   Re-evaluation  CPT 58117 Done Today Sets/Reps Comments Time   Re-evaluation

## 2025-01-02 ENCOUNTER — HOSPITAL ENCOUNTER (OUTPATIENT)
Dept: PHYSICAL THERAPY | Age: 55
Setting detail: THERAPIES SERIES
Discharge: HOME | End: 2025-01-02
Attending: FAMILY MEDICINE
Payer: COMMERCIAL

## 2025-01-02 DIAGNOSIS — Z98.890 S/P TENDON REPAIR: ICD-10-CM

## 2025-01-02 DIAGNOSIS — M76.891 ADDUCTOR TENDINITIS OF RIGHT HIP: Primary | ICD-10-CM

## 2025-01-02 PROCEDURE — 97530 THERAPEUTIC ACTIVITIES: CPT | Mod: GP

## 2025-01-02 PROCEDURE — 97110 THERAPEUTIC EXERCISES: CPT | Mod: GP

## 2025-01-02 NOTE — PROGRESS NOTES
Physical Therapy Progress Note    PT PROGRESS NOTE FOR OUTPATIENT THERAPY    Patient: Sharon Morales   MRN: [id number]  : 1970 54 y.o.    Referring Physician: Damion Brown MD  Date of Visit: 2025    Certification Dates: 10/29/24 through 25    Recommended Frequency & Duration:  2 times/week for up to 8 weeks   1-2x a week, try dropping to 25    Diagnosis:   1. Adductor tendinitis of right hip    2. S/P tendon repair        Chief Complaints:  No chief complaint on file.      Precautions:        TODAY'S VISIT:    Time In Session:  Start Time: 1201  Stop Time: 1244  Time Calculation (min): 43 min   General Information - 25 1202          Session Details    Document Type progress note     Mode of Treatment individual therapy        General Information    Referring Physician Damion Brown MD     History of present illness/functional impairment Pt reports R hip pain beginning in 2024 and is arriving with diagnosis of adductor strain s/p tenotomy performed on 10/10/24. Symptoms described to occur through anterior hip, groin, and upper thigh pain. During [place], pt describes increase in pain the night following resistance-based exercise workout. Two days later she attended a bar class which significantly aggravated pain. Pt visited ortho in August who diagnosed her with a groin tear. She was instructed to rest and only walk on the treadmill or swim to tolerance. Pt followed these instructions, however pain continued to gradually worsen with activity to the point where she could not stand or walk and was admitted to the ED on 24. Pt returned to ortho who recommended tenotomy occurring about 2.5 weeks ago. Pt required to rest following procedure for 2 weeks with no lifting, only light walking. Pt still having similar pain post procedure compared to prior level. Pt has difficulty with bending, lifting, squatting, rising from a chair, stair negotiation, getting in/out of bath, and  "getting in/out of a car. Pt had f/u with ortho “a few days ago” who recommended PT for eval and tx.   Significant PMHx includes: B/L knee replacement when she was 46yo, required revision for L one year later, and R the year after that; L hip replacement about 2 years ago (2022). Pt notes having gait deviations since her R knee replacement years ago which may have contributed to her symptoms.     Patient/Family/Caregiver Comments/Observations Pt reports improving by 90% since beginning skilled PT services with improvements in overall pain intensity and frequency with usual ADLs. Pt states \"the pain is no longer constant.\" Pt was away on vacation for the past 1-2 weeks where she had very limited pain. Today pain mild, which she attributes to the cold weather. She describes having ability to complete ADLs but has not fully returned back to her usual hobbies including weight training at the gym. Pt planning to sign up for a  to return to prior levels of function at the gym. Currently at the gym she has been performing light resistance exercise, and added some kicking with swimming with no increases in pain levels. Pt would like further PT sessions to review HEP, and ensure she is able to self manage symptoms I with updated program.                    Daily Falls Screen - 01/02/25 1202          Daily Falls Assessment    Patient reported fall since last visit No                    Pain/Vitals - 01/02/25 1202          Pain Assessment    Currently in pain No/Denies     Preferred Pain Scale number (Numeric Rating Pain Scale)     Pain Side/Orientation right     Pain: Body location Hip     Pain Level at Best 0     Pain Level at Worst 4     Pain Description dull;aching        Pain Intervention    Intervention  monitored     Post Intervention Comments monitored                    PT - 01/02/25 1202          Physical Therapy    Physical Therapy Specialty Ortho and Sports PT        PT Plan    Frequency of treatment " 2 times/week     PT Duration 8 weeks     PT Custom Frequency and Duration 1-2x a week, try dropping to 1/13/25     PT Cert From 10/29/24     PT Cert To 01/21/25     Date PT POC was sent to provider 10/29/24     Signed PT Plan of Care received?  Yes                    Assessment and Plan - 01/02/25 1202          Assessment    Plan of Care reviewed and patient/family in agreement Yes     System Pathology/Pathophysiology Noted musculoskeletal;neuromuscular     Functional Limitations in Following Categories (PT Eval) self-care;community/leisure     Rehab Potential/Prognosis good, to achieve stated therapy goals     Problem List decreased ROM;pain     Clinical Assessment Pt showing gains in gross LE strength, LE endurance as per 30s STS test, WOMAC scores, and subjective pain intensity and frequency. Pt showing strength deficits in hip extension, IR, ER, abduction, and adduction with mild pain into adduction only. Pt also shows some deficits with hip IR/ER AROM, and LE flexibility. Pt will benefit from further PT to address these deficits, update HEP, ensure HEP understanding, and promote ability to self manage symptoms I.     Plan and Recommendations Continue to progress towards established goals; review and update HEP in preparation for DC in the next 2 weeks; plan to drop to 1x/week starting week of 1/13/25.     Planned Services CPT 30301 Gait training;CPT 82607 Manual therapy;CPT 59868 Neuromuscular Reeducation;CPT 52486 Self-care/Home management training;CPT 84320 Therapeutic activities;CPT 80776 Therapeutic exercises;CPT 92505 Electrical stimulation UNATTENDED;CPT 14474 Hot/Cold Packs therapy                     OBJECTIVE MEASUREMENTS/DATA:    ROM    Range of Motion - 01/02/25 1200          RIGHT: Lower Extremity PROM Assessment    Hip Flexion  125 degrees        RIGHT: Lower Extremity AROM Assessment    Hip Flexion   114 degrees     Hip Abduction   50 degrees     Hip Internal Rotation   40 degrees     Hip External  Rotation   35 degrees                   MMT    Manual Muscle Tests - 01/02/25 1202          RIGHT: Lower Extremity Manual Muscle Test Assessment    Hip Flexion gross movement (5/5) normal     Hip Extension gross movement (4+/5) good plus     Hip Abduction gross movement (4+/5) good plus     Hip Adduction gross movement (4/5) good   slight groin pain    Hip Internal Rotation gross movement (4/5) good     Hip External Rotation gross movement (4/5) good     Knee Flexion strength (5/5) normal     Knee Extension strength (5/5) normal        LEFT: Lower Extremity Manual Muscle Test Assessment    Hip Flexion gross movement (4+/5) good plus     Hip Extension gross movement (4+/5) good plus     Hip Abduction gross movement (5/5) normal     Hip Internal Rotation gross movement (4/5) good   pain in L groin    Hip External Rotation gross movement (4-/5) good minus     Knee Flexion strength (5/5) normal     Knee Extension strength (5/5) normal                   Palpation    Palpation - 01/02/25 1202          Palpation    LE Palpation  TTP and increased tone proximal hip adductors                   Ortho Special Tests    Orthopedic Special Tests - 01/02/25 1300          Hip/Pelvis Special Tests    [place] Right - Positive   for rectus femoris tightness                  Balance/Posture    Balance and Posture - 01/02/25 1202          Postural Deviations    Postural Deviations low back;pelvis     Low Back lordosis     Pelvis anterior pelvic tilt                   Gait and Mobility    Gait and Mobility - 01/02/25 1202          Gait Training    [place], Gait independent     Variable surfaces Flat surface     Distance in Feet 20 feet     Deviations/Abnormal Patterns right sided deviations;antalgic;razia decreased;step length decreased;stride length decreased     Comment R>L in-toeing, decreased knee extension with termal stance on R, early heel rise bilaterally                   Outcome Measures    PT Outcome Measures - 01/02/25  1202          Objective Outcome Measures    30 Second Sit to Stand Test 18 repetitions   mild fatigue       Other Outcome Measures Used/Comments    Other outcome measure used: WOMAC: 17%                     ROM and MMT          10/29/2024 11/29/2024 1/2/2025   PT LE ROM Measurements   PROM: Right Hip Flexion   125 degrees   AROM: Right Hip Flexion 102 degrees 114 degrees 114 degrees   AROM: Left Hip Flexion 118 degrees     AROM: Right Hip Extension 8 degrees       Discomfort thorugh front of hip 8 degrees       Discomfort thorugh front of hip    AROM: Left Hip Extension 8 degrees     AROM: Right Hip ABD 45 degrees 50 degrees 50 degrees   AROM: Left Hip ABD 42 degrees     AROM: Right Hip IR 40 degrees 40 degrees 40 degrees   AROM: Left Hip IR 35 degrees     AROM: Right Hip ER 30 degrees 35 degrees 35 degrees   AROM: Left Hip ER 25 degrees     PT LE MMT   Right Hip Flexion (4-/5) good minus (4/5) good (5/5) normal   Left Hip Flexion (4+/5) good plus (4+/5) good plus (4+/5) good plus   Right Hip Extension (4/5) good (4/5) good (4+/5) good plus   Left Hip Extension (4+/5) good plus (4+/5) good plus (4+/5) good plus   Right Hip ABD (4-/5) good minus (4-/5) good minus (4+/5) good plus   Left Hip ABD (5/5) normal (5/5) normal (5/5) normal   Right Hip ADD   (4/5) good       slight groin pain   Right Hip IR (4-/5) good minus (4/5) good (4/5) good   Left Hip IR (4/5) good       pain in L groin (4/5) good       pain in L groin (4/5) good       pain in L groin   Right Hip ER (3+/5) fair plus       pain in R groin (4-/5) good minus       no pain (4/5) good   Left Hip ER (4-/5) good minus (4-/5) good minus (4-/5) good minus   Right Knee Flexion (4/5) good (4+/5) good plus (5/5) normal   Left Knee Flexion (5/5) normal (5/5) normal (5/5) normal   Right Knee Extension (4+/5) good plus (4+/5) good plus (5/5) normal   Left Knee Extension (5/5) normal (5/5) normal (5/5) normal   Additional Manual Muscle Tests   Additional MMT  Plank  endurance: Modified front plank= 31s      Outcome Measures          10/29/2024    12:02 11/29/2024    12:58 1/2/2025    12:02   PT OBJECTIVE Outcome Measures   30 Second Sit to Stand 12 repetitions       1/10 pain through groing region on R 14 repetitions       slight groin pain 1/10 18 repetitions       mild fatigue   PT SUBJECTIVE Outcome Measures   Other WOMAC: 47% WOMAC: 27% WOMAC: 17%       Today's Treatment::    Education provided:  Yes: See treatment log for details of education provided    Diagnosis       R Hip adductor strain s/p tenotomy Sharon Carmen   Precautions:   Eval: 10/29  PN: 11/29  PN: 12/27  Re-eval: 1/21  POC till: 1/21      Outcome Measure:  WOMAC     Total Time for Session Not performed Group CPT 30317   Modalities  CPT HP/CP 46080 Done Today Sets/Reps Comments Time   MHP       CP       Vaso               Total Time for Session Not performed Group CPT 67649   Gait Training  CPT 83184 Done Today Sets/Reps Comments Time                  Total Time for Session Not performed Group CPT 06870   Manual Therapy  CPT 34663 Done Today Sets/Reps Comments Time   STM   R proximal quads, hip adductors    IASTM   Hand held roller to proximal quads    Jt mobs       LAD y   LE [id number]     Total Time for Session Not performed Group CPT 64293   Nueromuscular Re-education  CPT 03054 Done Today Sets/Reps Comments Time   SLS       Tandem stance                      Total Time for Session 23-37 Minutes Group CPT 34898   Therapeutic Activity  CPT 68133 Done Today Sets/Reps Comments Time   Pt. Education y  Pt education on examination findings, progress towards goals, PT POC, and HEP    Update HEP y  Added core strength to HEP    PN Y                     Total Time for Session 8-22 Minutes Group CPT 27464   Therapeutic Exercise  CPT 30490 Done Today Sets/Reps Comments Time          Recumbent Bike       Upright bike       Elliptical       Treadmill y 10:00 2.3 mph  3% incline                  Stretches:        Calf  Stretch HEP :60 Has slantboard at home  Lvl 5    Adductor stretch n :30x3 SOS    Modified tequila N 3x:30 RLE; with SOS    Piriformis stretch N 2x:30 Pull towards opp shoulder and away    DKTC n :5x20 Red ball    Standing side lunge N :30x2 Standing at // bars    PQS n 3x30s R only    1/2 kneel HF stretch nv 2x30s            Strengthening: (table/seated)       S/L Clams HEP 5'' 2x10 RTB    Reverse clam HEP 5'' 2x10 GTB    SLR with TAC       S/L Hip Add        S/L Hip Abd HEP x10     Bridge n 2x10     Bridge with Hip add n 5''x10     Bridge with TB hip adb n  n 5'' 1x10  5'' 1x6 RTB  ''    Bridge on Bosu with march       TAC n 5''x5     Reverse deadbug n 5''x10     Modified deadbug nv x20ea With PB    TAC 90/90 heel taps  TAC w/ march  nv    n X10ea    x10     Up,up-down,down    Bicycle kicks n x20ea Forward and reverse    Deadbug  2x10     Bridge w/ march  x10     SL bridge                     Strengthening: (standing)       Mini-squat n  n x10  x10 RTB around knees  Hip adductor ball squeeze    Wall sit p      Front Planks HEP 2x30s Modified on knees    Side plank with rotation reach  x10     3 way hip kicks nv x10ea No band, on 1/2 roll     Side step n X2 laps GTB    Monster Walk n X2 laps GTB    Hip hinge n 2x10     Hip hike nv X15 each Wood block    RDL nv      n 2x10     B 13# KB  PLAN: 18# kb nv    U 13# KB FR for balance    Goblet squat nv 2x10 18# KB    Slider lunge nv X10 each Side and rev    Pallof press nv 2x10ea Black tubing  With OH lift    Shoulder ext with march n x10ea Black tubing    Farmer's carry with marching nv X3 laps With 18lb KB  *Pt has KB at home (lightest 18lbs)    Resisted march nv x20 Red Band at feet    PNF Chops/ lifts nv x15ea Black tubing  Down= double  Up= single    Step ups (Fwd) w/ hip flexion n 2x10 6 inch    Step ups (lateral) nv 2x10 On BOSU    Planked hip ext nv 2x10 Red PB in chair     Resisted walk away nv x10ea 4 ways, CC 3.5 plates                   Total Time for Session  Not performed Group CPT 39908   Re-evaluation  CPT 02402 Done Today Sets/Reps Comments Time   Re-evaluation                    Goals Addressed                      This Visit's Progress      Mutually agreed upon pain goal         Mutually agreed upon pain goal: 0/10      1/2: Pain at worst 4/10        Patient Stated (pt-stated)         Pt will be able to return to resistance training at the gym without pain.     11/29: Pt has not returned to resistance training at gym as per PT's recommendations; Pt has returned to back stroke swimming and higher level ADLs     1/2: Pt returned to light resistance training with no complaints. Pt has also added kicking with swimming. Pt has been away for the past 1-2 weeks on vacation and has yet to return to gym since, she plans to start back this week.         PT R hip Goals         Short Term Goals Time Frame (weeks) Result Comment/Progress   Pt will be I with HEP to promote ability to self manage symptoms 4 Goal met    Pain at worst rated no greater than 5/10 for improved QOL 4 met    Joint ROM will improve by 5 deg to promote improved tolerance to ADLs 4 met    Pt will improve LE strength where limited by 1/2 MMT grade to promote ability to ascend stairs 4 met    Subjective outcome scores will improve by 9 points 4 met    Pt will increase 30s STS by 1 rep to indicate improved endurance 4 met    Pt will perform a functional squat without genu valgus 4 met        Long Term Goals Time Frame (weeks) Result Comment/Progress   Pt will be I and compliant with updated HEP 8     Pain at worst will decrease to no greater than 1/10 for improved QOL 8     Joint ROM will improve by 15-20 deg to promote improved tolerance to ADLs 8     Pt will improve LE strength where limited by 1 MMT grade to promote improved ability to ascend stairs 8     Subjective outcome scores will improve by 18 points 8     Pt will increase 30s STS to >/= 15 reps to indicate improved endurance 8     Pt will perform  single leg functional squats without genu valgus or pain 8                    Jaz Martel, PT

## 2025-01-02 NOTE — OP PT TREATMENT LOG
Diagnosis       R Hip adductor strain s/p tenotomy Sharon Carmen   Precautions:   Eval: 10/29  PN: 11/29  PN: 12/27  Re-eval: 1/21  POC till: 1/21      Outcome Measure:  WOMAC     Total Time for Session Not performed Group CPT 44306   Modalities  CPT HP/CP 85237 Done Today Sets/Reps Comments Time   MHP       CP       Vaso               Total Time for Session Not performed Group CPT 97218   Gait Training  CPT 59168 Done Today Sets/Reps Comments Time                  Total Time for Session Not performed Group CPT 66346   Manual Therapy  CPT 65699 Done Today Sets/Reps Comments Time   STM   R proximal quads, hip adductors    IASTM   Hand held roller to proximal quads    Jt mobs       LAD y   LE [id number]     Total Time for Session Not performed Group CPT 84970   Nueromuscular Re-education  CPT 70599 Done Today Sets/Reps Comments Time   SLS       Tandem stance                      Total Time for Session 23-37 Minutes Group CPT 63492   Therapeutic Activity  CPT 51425 Done Today Sets/Reps Comments Time   Pt. Education y  Pt education on examination findings, progress towards goals, PT POC, and HEP    Update HEP y  Added core strength to HEP    PN Y                     Total Time for Session 8-22 Minutes Group CPT 65443   Therapeutic Exercise  CPT 32803 Done Today Sets/Reps Comments Time          Recumbent Bike       Upright bike       Elliptical       Treadmill y 10:00 2.3 mph  3% incline                  Stretches:        Calf Stretch HEP :60 Has slantboard at home  Lvl 5    Adductor stretch n :30x3 SOS    Modified tequila N 3x:30 RLE; with SOS    Piriformis stretch N 2x:30 Pull towards opp shoulder and away    DKTC n :5x20 Red ball    Standing side lunge N :30x2 Standing at // bars    PQS n 3x30s R only    1/2 kneel HF stretch nv 2x30s            Strengthening: (table/seated)       S/L Clams HEP 5'' 2x10 RTB    Reverse clam HEP 5'' 2x10 GTB    SLR with TAC       S/L Hip Add        S/L Hip Abd HEP x10     Bridge n 2x10      Bridge with Hip add n 5''x10     Bridge with TB hip adb n  n 5'' 1x10  5'' 1x6 RTB  ''    Bridge on Bosu with march       TAC n 5''x5     Reverse deadbug n 5''x10     Modified deadbug nv x20ea With PB    TAC 90/90 heel taps  TAC w/ march  nv    n X10ea    x10     Up,up-down,down    Bicycle kicks n x20ea Forward and reverse    Deadbug  2x10     Bridge w/ march  x10     SL bridge                     Strengthening: (standing)       Mini-squat n  n x10  x10 RTB around knees  Hip adductor ball squeeze    Wall sit p      Front Planks HEP 2x30s Modified on knees    Side plank with rotation reach  x10     3 way hip kicks nv x10ea No band, on 1/2 roll     Side step n X2 laps GTB    Monster Walk n X2 laps GTB    Hip hinge n 2x10     Hip hike nv X15 each Wood block    RDL nv      n 2x10     B 13# KB  PLAN: 18# kb nv    U 13# KB FR for balance    Goblet squat nv 2x10 18# KB    Slider lunge nv X10 each Side and rev    Pallof press nv 2x10ea Black tubing  With OH lift    Shoulder ext with march n x10ea Black tubing    Farmer's carry with marching nv X3 laps With 18lb KB  *Pt has KB at home (lightest 18lbs)    Resisted march nv x20 Red Band at feet    PNF Chops/ lifts nv x15ea Black tubing  Down= double  Up= single    Step ups (Fwd) w/ hip flexion n 2x10 6 inch    Step ups (lateral) nv 2x10 On BOSU    Planked hip ext nv 2x10 Red PB in chair     Resisted walk away nv x10ea 4 ways, CC 3.5 plates                   Total Time for Session Not performed Group CPT 97575   Re-evaluation  CPT 62950 Done Today Sets/Reps Comments Time   Re-evaluation

## 2025-01-07 ENCOUNTER — HOSPITAL ENCOUNTER (OUTPATIENT)
Dept: PHYSICAL THERAPY | Age: 55
Setting detail: THERAPIES SERIES
Discharge: HOME | End: 2025-01-07
Attending: FAMILY MEDICINE
Payer: COMMERCIAL

## 2025-01-07 DIAGNOSIS — M76.891 ADDUCTOR TENDINITIS OF RIGHT HIP: Primary | ICD-10-CM

## 2025-01-07 DIAGNOSIS — Z98.890 S/P TENDON REPAIR: ICD-10-CM

## 2025-01-07 PROCEDURE — 97110 THERAPEUTIC EXERCISES: CPT | Mod: GP,CQ

## 2025-01-07 NOTE — PROGRESS NOTES
Physical Therapy Visit    PT DAILY NOTE FOR OUTPATIENT THERAPY    Patient: Sharon oMrales MRN: [id number]  : 1970 54 y.o.  Referring Physician: Damion Brown MD  Date of Visit: 2025    Certification Dates: 10/29/24 through 25    Diagnosis:   1. Adductor tendinitis of right hip    2. S/P tendon repair        Chief Complaints:  adductor pain, s/p tenotomy    Precautions:          TODAY'S VISIT    Time In Session:  Start Time: 1200  Stop Time: 1300  Time Calculation (min): 60 min   History/Vitals/Pain/Encounter Info - 25 1226          Injury History/Precautions/Daily Required Info    Document Type daily treatment     Primary Therapist Jaz Martel     Chief Complaint/Reason for Visit  adductor pain, s/p tenotomy     Referring Physician Damion Brown MD     History of present illness/functional impairment Pt reports R hip pain beginning in 2024 and is arriving with diagnosis of adductor strain s/p tenotomy performed on 10/10/24. Symptoms described to occur through anterior hip, groin, and upper thigh pain. During ILDEFONSO, pt describes increase in pain the night following resistance-based exercise workout. Two days later she attended a bar class which significantly aggravated pain. Pt visited ortho in August who diagnosed her with a groin tear. She was instructed to rest and only walk on the treadmill or swim to tolerance. Pt followed these instructions, however pain continued to gradually worsen with activity to the point where she could not stand or walk and was admitted to the ED on 24. Pt returned to ortho who recommended tenotomy occurring about 2.5 weeks ago. Pt required to rest following procedure for 2 weeks with no lifting, only light walking. Pt still having similar pain post procedure compared to prior level. Pt has difficulty with bending, lifting, squatting, rising from a chair, stair negotiation, getting in/out of bath, and getting in/out of a car. Pt had f/u with ortho  “a few days ago” who recommended PT for eval and tx.   Significant PMHx includes: B/L knee replacement when she was 46yo, required revision for L one year later, and R the year after that; L hip replacement about 2 years ago (2022). Pt notes having gait deviations since her R knee replacement years ago which may have contributed to her symptoms.     Patient/Family/Caregiver Comments/Observations Pt. reports R hip is feeling really good today and states she has resumed going to gym and doing weight work outs and has advanced swimming with only general muscle soreness.     Patient reported fall since last visit No        Pain Assessment    Currently in pain No/Denies                    Daily Treatment Assessment and Plan - 01/07/25 1521          Daily Treatment Assessment and Plan    Progress toward goals Progressing     Daily Outcome Summary Pt. reports that she hired a  and that she will start working with her next week and will decrease PT to 1x per week until D/C. Started session with TM x 10 min with increased speed to 2.4 mph and TE for LE strength and flexibility as noted on log and updated HEP with 1/2 kneel hip flexor stretch and side stpping with TB. Pt. demo understanding of printed instructions.     Plan and Recommendations Continue to progress towards established goals; review and update HEP in preparation for DC in the next 2 weeks.                         OBJECTIVE DATA TAKEN TODAY:    None taken    Today's Treatment:    Diagnosis       R Hip adductor strain s/p tenotomy Sharon Carmen   Precautions:   Eval: 10/29  PN: 11/29  PN: 12/27  Re-eval: 1/21  POC till: 1/21      Outcome Measure:  WOMAC     Total Time for Session Not performed Group CPT 33965   Modalities  CPT HP/CP 13869 Done Today Sets/Reps Comments Time   MHP       CP       Vaso               Total Time for Session Not performed Group CPT 89213   Gait Training  CPT 27533 Done Today Sets/Reps Comments Time                  Total Time for  Session Not performed Group CPT 80639   Manual Therapy  CPT 97699 Done Today Sets/Reps Comments Time   STM   R proximal quads, hip adductors    IASTM   Hand held roller to proximal quads    Jt mobs       LAD y   LE [id number]     Total Time for Session Not performed Group CPT 97632   Nueromuscular Re-education  CPT 61819 Done Today Sets/Reps Comments Time   SLS       Tandem stance                      Total Time for Session 23-37 Minutes Group CPT 47963   Therapeutic Activity  CPT 47821 Done Today Sets/Reps Comments Time   Pt. Education y  Pt education on examination findings, progress towards goals, PT POC, and HEP    Update HEP y  Added core strength to HEP    PN Y                     Total Time for Session 8-22 Minutes Group CPT 78610   Therapeutic Exercise  CPT 64365 Done Today Sets/Reps Comments Time          Recumbent Bike       Upright bike       Elliptical       Treadmill y 10:00 2.4 mph  3% incline                  Stretches:        Calf Stretch HEP :60 Has slantboard at home  Lvl 5    Adductor stretch n :30x3 SOS    Modified tequila N 3x:30 RLE; with SOS    Piriformis stretch N 2x:30 Pull towards opp shoulder and away    DKTC n :5x20 Red ball    Standing side lunge N :30x2 Standing at // bars    PQS y 3x30s R only    1/2 kneel HF stretch y 2x30s     HSS y SOS     Strengthening: (table/seated)       S/L Clams HEP 5'' 2x10 RTB    Reverse clam HEP 5'' 2x10 GTB    SLR with TAC       S/L Hip Add        S/L Hip Abd HEP x10     Bridge n 2x10     Bridge with Hip add n 5''x10     Bridge with TB hip adb n  n 5'' 1x10  5'' 1x6 RTB  ''    Bridge on Bosu with march       TAC n 5''x5     Reverse deadbug n 5''x10     Modified deadbug nv x20ea With PB    TAC 90/90 heel taps  TAC w/ march  nv    n X10ea    x10     Up,up-down,down    Bicycle kicks n x20ea Forward and reverse    Deadbug  2x10     Bridge w/ march  x10     SL bridge                     Strengthening: (standing)       Mini-squat n  n x10  x10 RTB around knees  Hip  adductor ball squeeze    Wall sit p      Front Planks HEP 2x30s Modified on knees    Side plank with rotation reach  x10     3 way hip kicks nv x10ea No band, on 1/2 roll     Side step    Side step  Y    y X2 laps    X2 laps RTB 2 steps left, 1 step right  and return  Holding 13# KB    Monster Walk n X2 laps GTB    Hip hinge n 2x10     Hip hike nv X15 each Wood block    RDL y 2x10 U 13# KB FR for balance    Goblet squat nv 2x10 18# KB    Slider lunge nv X10 each Side and rev    Pallof press nv 2x10ea Black tubing  With OH lift    Shoulder ext with march n x10ea Black tubing    Farmer's carry with walking long stride nv X2 laps With 18lb KB  *Pt has KB at home (lightest 18lbs)    Resisted march nv x20 Red Band at feet    PNF Chops/ lifts nv x15ea Black tubing  Down= double  Up= single    Step ups (Fwd) w/ hip flexion n 2x10 6 inch    Step ups (lateral) y 2x10 On BOSU    Planked hip ext nv 2x10 Red PB in chair     Resisted walk away nv x10ea 4 ways, CC 3.5 plates                   Total Time for Session Not performed Group CPT 38504   Re-evaluation  CPT 35887 Done Today Sets/Reps Comments Time   Re-evaluation

## 2025-01-07 NOTE — OP PT TREATMENT LOG
Diagnosis       R Hip adductor strain s/p tenotomy Sharon Carmen   Precautions:   Eval: 10/29  PN: 11/29  PN: 12/27  Re-eval: 1/21  POC till: 1/21      Outcome Measure:  WOMAC     Total Time for Session Not performed Group CPT 75820   Modalities  CPT HP/CP 16135 Done Today Sets/Reps Comments Time   MHP       CP       Vaso               Total Time for Session Not performed Group CPT 59148   Gait Training  CPT 76218 Done Today Sets/Reps Comments Time                  Total Time for Session Not performed Group CPT 16181   Manual Therapy  CPT 24927 Done Today Sets/Reps Comments Time   STM   R proximal quads, hip adductors    IASTM   Hand held roller to proximal quads    Jt mobs       LAD y   LE [id number]     Total Time for Session Not performed Group CPT 43833   Nueromuscular Re-education  CPT 46751 Done Today Sets/Reps Comments Time   SLS       Tandem stance                      Total Time for Session 23-37 Minutes Group CPT 13934   Therapeutic Activity  CPT 33195 Done Today Sets/Reps Comments Time   Pt. Education y  Pt education on examination findings, progress towards goals, PT POC, and HEP    Update HEP y  Added core strength to HEP    PN Y                     Total Time for Session 8-22 Minutes Group CPT 69892   Therapeutic Exercise  CPT 58094 Done Today Sets/Reps Comments Time          Recumbent Bike       Upright bike       Elliptical       Treadmill y 10:00 2.4 mph  3% incline                  Stretches:        Calf Stretch HEP :60 Has slantboard at home  Lvl 5    Adductor stretch n :30x3 SOS    Modified tequila N 3x:30 RLE; with SOS    Piriformis stretch N 2x:30 Pull towards opp shoulder and away    DKTC n :5x20 Red ball    Standing side lunge N :30x2 Standing at // bars    PQS y 3x30s R only    1/2 kneel HF stretch y 2x30s     HSS y SOS     Strengthening: (table/seated)       S/L Clams HEP 5'' 2x10 RTB    Reverse clam HEP 5'' 2x10 GTB    SLR with TAC       S/L Hip Add        S/L Hip Abd HEP x10     Bridge n 2x10      Bridge with Hip add n 5''x10     Bridge with TB hip adb n  n 5'' 1x10  5'' 1x6 RTB  ''    Bridge on Bosu with march       TAC n 5''x5     Reverse deadbug n 5''x10     Modified deadbug nv x20ea With PB    TAC 90/90 heel taps  TAC w/ march  nv    n X10ea    x10     Up,up-down,down    Bicycle kicks n x20ea Forward and reverse    Deadbug  2x10     Bridge w/ march  x10     SL bridge                     Strengthening: (standing)       Mini-squat n  n x10  x10 RTB around knees  Hip adductor ball squeeze    Wall sit p      Front Planks HEP 2x30s Modified on knees    Side plank with rotation reach  x10     3 way hip kicks nv x10ea No band, on 1/2 roll     Side step    Side step  Y    y X2 laps    X2 laps RTB 2 steps left, 1 step right  and return  Holding 13# KB    Monster Walk n X2 laps GTB    Hip hinge n 2x10     Hip hike nv X15 each Wood block    RDL y 2x10 U 13# KB FR for balance    Goblet squat nv 2x10 18# KB    Slider lunge nv X10 each Side and rev    Pallof press nv 2x10ea Black tubing  With OH lift    Shoulder ext with march n x10ea Black tubing    Farmer's carry with walking long stride nv X2 laps With 18lb KB  *Pt has KB at home (lightest 18lbs)    Resisted march nv x20 Red Band at feet    PNF Chops/ lifts nv x15ea Black tubing  Down= double  Up= single    Step ups (Fwd) w/ hip flexion n 2x10 6 inch    Step ups (lateral) y 2x10 On BOSU    Planked hip ext nv 2x10 Red PB in chair     Resisted walk away nv x10ea 4 ways, CC 3.5 plates                   Total Time for Session Not performed Group CPT 67137   Re-evaluation  CPT 93174 Done Today Sets/Reps Comments Time   Re-evaluation

## 2025-01-14 ENCOUNTER — HOSPITAL ENCOUNTER (OUTPATIENT)
Dept: PHYSICAL THERAPY | Age: 55
Setting detail: THERAPIES SERIES
Discharge: HOME | End: 2025-01-14
Attending: FAMILY MEDICINE
Payer: COMMERCIAL

## 2025-01-14 DIAGNOSIS — M76.891 ADDUCTOR TENDINITIS OF RIGHT HIP: Primary | ICD-10-CM

## 2025-01-14 DIAGNOSIS — Z98.890 S/P TENDON REPAIR: ICD-10-CM

## 2025-01-14 PROCEDURE — 97110 THERAPEUTIC EXERCISES: CPT | Mod: GP,CQ

## 2025-01-14 NOTE — OP PT TREATMENT LOG
Diagnosis       R Hip adductor strain s/p tenotomy Sharon Carmen   Precautions:   Eval: 10/29  PN: 11/29  PN: 12/27  Re-eval: 1/21  POC till: 1/21      Outcome Measure:  WOMAC     Total Time for Session Not performed Group CPT 24421   Modalities  CPT HP/CP 47951 Done Today Sets/Reps Comments Time   MHP       CP       Vaso               Total Time for Session Not performed Group CPT 28706   Gait Training  CPT 87568 Done Today Sets/Reps Comments Time                  Total Time for Session Not performed Group CPT 44863   Manual Therapy  CPT 49471 Done Today Sets/Reps Comments Time   STM   R proximal quads, hip adductors    IASTM   Hand held roller to proximal quads    Jt mobs       LAD y   LE [id number]     Total Time for Session Not performed Group CPT 56047   Nueromuscular Re-education  CPT 25272 Done Today Sets/Reps Comments Time   SLS       Tandem stance                      Total Time for Session Not performed Group CPT 69041   Therapeutic Activity  CPT 51374 Done Today Sets/Reps Comments Time   Pt. Education y  Pt education on examination findings, progress towards goals, PT POC, and HEP    Update HEP y  Added core strength to HEP    PN Y                     Total Time for Session 53-67 Minutes Group CPT 92332   Therapeutic Exercise  CPT 18842 Done Today Sets/Reps Comments Time          Recumbent Bike       Upright bike       Elliptical       Treadmill y 10:00 2.4 mph  3% incline                  Stretches:        Calf Stretch HEP :60 Has slantboard at home  Lvl 5    Adductor stretch n :30x3 SOS    Modified tequila N 3x:30 RLE; with SOS    Piriformis stretch N 2x:30 Pull towards opp shoulder and away    DKTC n :5x20 Red ball    Standing side lunge N :30x2 Standing at // bars    PQS n 3x30s R only    1/2 kneel HF stretch n 2x30s     HSS n SOS     Strengthening: (table/seated)       S/L Clams HEP 5'' 2x10 RTB    Reverse clam HEP 5'' 2x10 GTB    SLR with TAC       S/L Hip Add        S/L Hip Abd HEP x10     Bridge n 2x10      Bridge with Hip add n 5''x10     Bridge with TB hip adb n  n 5'' 1x10  5'' 1x6 RTB  ''    Bridge on Bosu with march       TAC n 5''x5     Reverse deadbug n 5''x10     Modified deadbug nv x20ea With PB    TAC 90/90 heel taps  TAC w/ march  nv    n X10ea    x10     Up,up-down,down    Bicycle kicks n x20ea Forward and reverse    Deadbug  2x10     Bridge w/ march  x10     SL bridge                     Strengthening: (standing)       Mini-squat n  n x10  x10 RTB around knees  Hip adductor ball squeeze    Wall sit p      Front Planks HEP 2x30s Modified on knees    Side plank with rotation reach  x10     3 way hip kicks nv x10ea No band, on 1/2 roll     Side step    Side step  Y    n X2 laps    X2 laps RTB 2 steps left, 1 step right  and return  Holding 13# KB    Monster Walk n X2 laps GTB    Hip hinge n 2x10     Hip hike nv X15 each Wood block    RDL y 2x10 U 13# KB FR for balance    Goblet squat y 2x10 18# KB    Slider lunge nv X10 each Side and rev    Pallof press y 2x10ea Black tubing  With OH lift    Shoulder ext with march n x10ea Black tubing    Farmer's carry with march y X2 laps With 18lb KB  *Pt has KB at home (lightest 18lbs)    Resisted march nv x20 Red Band at feet    PNF Chops/ lifts nv x15ea Black tubing  Down= double  Up= single    Step ups (Fwd) w/ hip flexion n 2x10 6 inch    Step ups (lateral) y 2x10 On BOSU    Planked hip ext Y  2x10 Red PB in chair     Resisted walk away y x10ea 4 ways, CC 3.5 plates                   Total Time for Session Not performed Group CPT 22830   Re-evaluation  CPT 99111 Done Today Sets/Reps Comments Time   Re-evaluation

## 2025-01-15 NOTE — PROGRESS NOTES
Physical Therapy Visit    PT DAILY NOTE FOR OUTPATIENT THERAPY    Patient: Sharon Morales MRN: [id number]  : 1970 54 y.o.  Referring Physician: Damion Brown MD  Date of Visit: 2025    Certification Dates: 10/29/24 through 25    Diagnosis:   1. Adductor tendinitis of right hip    2. S/P tendon repair        Chief Complaints:  adductor pain, s/p tenotomy    Precautions:          TODAY'S VISIT    Time In Session:  Start Time: 1202  Stop Time: 1300  Time Calculation (min): 58 min   History/Vitals/Pain/Encounter Info - 25 1200          Injury History/Precautions/Daily Required Info    Document Type daily treatment     Primary Therapist Jaz Martel     Chief Complaint/Reason for Visit  adductor pain, s/p tenotomy     Referring Physician Damion Brown MD     History of present illness/functional impairment Pt reports R hip pain beginning in 2024 and is arriving with diagnosis of adductor strain s/p tenotomy performed on 10/10/24. Symptoms described to occur through anterior hip, groin, and upper thigh pain. During ILDEFONSO, pt describes increase in pain the night following resistance-based exercise workout. Two days later she attended a bar class which significantly aggravated pain. Pt visited ortho in August who diagnosed her with a groin tear. She was instructed to rest and only walk on the treadmill or swim to tolerance. Pt followed these instructions, however pain continued to gradually worsen with activity to the point where she could not stand or walk and was admitted to the ED on 24. Pt returned to ortho who recommended tenotomy occurring about 2.5 weeks ago. Pt required to rest following procedure for 2 weeks with no lifting, only light walking. Pt still having similar pain post procedure compared to prior level. Pt has difficulty with bending, lifting, squatting, rising from a chair, stair negotiation, getting in/out of bath, and getting in/out of a car. Pt had f/u with ortho  “a few days ago” who recommended PT for eval and tx.   Significant PMHx includes: B/L knee replacement when she was 44yo, required revision for L one year later, and R the year after that; L hip replacement about 2 years ago (2022). Pt notes having gait deviations since her R knee replacement years ago which may have contributed to her symptoms.     Patient/Family/Caregiver Comments/Observations Pt. reports that she had mild pain in R hip over weekend but other than that she has been able to resume all regular activity without any issues.     Patient reported fall since last visit No        Pain Assessment    Currently in pain No/Denies                    Daily Treatment Assessment and Plan - 01/14/25 1850          Daily Treatment Assessment and Plan    Progress toward goals Progressing     Daily Outcome Summary Pt. reports that she is swimming and walking on TM and doing weights and working with a  at the [facility]. Started session with active warm up on TM x 10 min and review of stretches with demo and strength exs as noted on activity log. Pt. brought in current HEP for primary PT to update for DC.     Plan and Recommendations Continue to progress towards established goals; review and update HEP in preparation for DC in the next week.                         OBJECTIVE DATA TAKEN TODAY:    None taken    Today's Treatment:    Diagnosis       R Hip adductor strain s/p tenotomy Sharon Carmen   Precautions:   Eval: 10/29  PN: 11/29  PN: 12/27  Re-eval: 1/21  POC till: 1/21      Outcome Measure:  WOMAC     Total Time for Session Not performed Group CPT 15434   Modalities  CPT HP/CP 57927 Done Today Sets/Reps Comments Time   MHP       CP       Vaso               Total Time for Session Not performed Group CPT 43531   Gait Training  CPT 78845 Done Today Sets/Reps Comments Time                  Total Time for Session Not performed Group CPT 29687   Manual Therapy  CPT 31064 Done Today Sets/Reps Comments Time   STM   R  proximal quads, hip adductors    IASTM   Hand held roller to proximal quads    Jt mobs       LAD y   LE [id number]     Total Time for Session Not performed Group CPT 57615   Nueromuscular Re-education  CPT 85530 Done Today Sets/Reps Comments Time   SLS       Tandem stance                      Total Time for Session Not performed Group CPT 93104   Therapeutic Activity  CPT 59322 Done Today Sets/Reps Comments Time   Pt. Education y  Pt education on examination findings, progress towards goals, PT POC, and HEP    Update HEP y  Added core strength to HEP    PN Y                     Total Time for Session 53-67 Minutes Group CPT 54129   Therapeutic Exercise  CPT 55405 Done Today Sets/Reps Comments Time          Recumbent Bike       Upright bike       Elliptical       Treadmill y 10:00 2.4 mph  3% incline                  Stretches:        Calf Stretch HEP :60 Has slantboard at home  Lvl 5    Adductor stretch n :30x3 SOS    Modified tequila N 3x:30 RLE; with SOS    Piriformis stretch N 2x:30 Pull towards opp shoulder and away    DKTC n :5x20 Red ball    Standing side lunge N :30x2 Standing at // bars    PQS n 3x30s R only    1/2 kneel HF stretch n 2x30s     HSS n SOS     Strengthening: (table/seated)       S/L Clams HEP 5'' 2x10 RTB    Reverse clam HEP 5'' 2x10 GTB    SLR with TAC       S/L Hip Add        S/L Hip Abd HEP x10     Bridge n 2x10     Bridge with Hip add n 5''x10     Bridge with TB hip adb n  n 5'' 1x10  5'' 1x6 RTB  ''    Bridge on Bosu with march       TAC n 5''x5     Reverse deadbug n 5''x10     Modified deadbug nv x20ea With PB    TAC 90/90 heel taps  TAC w/ march  nv    n X10ea    x10     Up,up-down,down    Bicycle kicks n x20ea Forward and reverse    Deadbug  2x10     Bridge w/ march  x10     SL bridge                     Strengthening: (standing)       Mini-squat n  n x10  x10 RTB around knees  Hip adductor ball squeeze    Wall sit p      Front Planks HEP 2x30s Modified on knees    Side plank with rotation  reach  x10     3 way hip kicks nv x10ea No band, on 1/2 roll     Side step    Side step  Y    n X2 laps    X2 laps RTB 2 steps left, 1 step right  and return  Holding 13# KB    Monster Walk n X2 laps GTB    Hip hinge n 2x10     Hip hike nv X15 each Wood block    RDL y 2x10 U 13# KB FR for balance    Goblet squat y 2x10 18# KB    Slider lunge nv X10 each Side and rev    Pallof press y 2x10ea Black tubing  With OH lift    Shoulder ext with march n x10ea Black tubing    Farmer's carry with march y X2 laps With 18lb KB  *Pt has KB at home (lightest 18lbs)    Resisted march nv x20 Red Band at feet    PNF Chops/ lifts nv x15ea Black tubing  Down= double  Up= single    Step ups (Fwd) w/ hip flexion n 2x10 6 inch    Step ups (lateral) y 2x10 On BOSU    Planked hip ext Y  2x10 Red PB in chair     Resisted walk away y x10ea 4 ways, CC 3.5 plates                   Total Time for Session Not performed Group CPT 54674   Re-evaluation  CPT 43355 Done Today Sets/Reps Comments Time   Re-evaluation

## 2025-01-23 ENCOUNTER — HOSPITAL ENCOUNTER (OUTPATIENT)
Dept: PHYSICAL THERAPY | Age: 55
Setting detail: THERAPIES SERIES
Discharge: HOME | End: 2025-01-23
Attending: FAMILY MEDICINE
Payer: COMMERCIAL

## 2025-01-23 DIAGNOSIS — M76.891 ADDUCTOR TENDINITIS OF RIGHT HIP: Primary | ICD-10-CM

## 2025-01-23 DIAGNOSIS — Z98.890 S/P TENDON REPAIR: ICD-10-CM

## 2025-01-23 PROCEDURE — 97530 THERAPEUTIC ACTIVITIES: CPT | Mod: GP

## 2025-01-23 NOTE — OP PT TREATMENT LOG
Diagnosis       R Hip adductor strain s/p tenotomy Sharon Carmen   Precautions:   Eval: 10/29  PN: 11/29  PN: 12/27  Re-eval: 1/21  POC till: 1/21      Outcome Measure:  WOMAC     Total Time for Session Not performed Group CPT 65178   Modalities  CPT HP/CP 99495 Done Today Sets/Reps Comments Time   MHP       CP       Vaso               Total Time for Session Not performed Group CPT 81475   Gait Training  CPT 87437 Done Today Sets/Reps Comments Time                  Total Time for Session Not performed Group CPT 76742   Manual Therapy  CPT 08573 Done Today Sets/Reps Comments Time   STM   R proximal quads, hip adductors    IASTM   Hand held roller to proximal quads    Jt mobs       LAD y   LE [id number]     Total Time for Session Not performed Group CPT 72354   Nueromuscular Re-education  CPT 93988 Done Today Sets/Reps Comments Time   SLS       Tandem stance                      Total Time for Session 38-52 Minutes Group CPT 47752   Therapeutic Activity  CPT 82189 Done Today Sets/Reps Comments Time   Pt. Education y  Pt education on examination findings, progress towards goals, PT POC, and HEP    Update HEP y  Added core strength to HEP    PN       Discharge y              Total Time for Session Not performed Group CPT 64767   Therapeutic Exercise  CPT 71621 Done Today Sets/Reps Comments Time          Recumbent Bike       Upright bike       Elliptical       Treadmill y 10:00 2.4 mph  3% incline                  Stretches:        Calf Stretch HEP :60 Has slantboard at home  Lvl 5    Adductor stretch n :30x3 SOS    Modified tequila N 3x:30 RLE; with SOS    Piriformis stretch N 2x:30 Pull towards opp shoulder and away    DKTC n :5x20 Red ball    Standing side lunge N :30x2 Standing at // bars    PQS n 3x30s R only    1/2 kneel HF stretch n 2x30s     HSS n SOS     Strengthening: (table/seated)       S/L Clams HEP 5'' 2x10 RTB    Reverse clam HEP 5'' 2x10 GTB    SLR with TAC       S/L Hip Add        S/L Hip Abd HEP x10      Bridge n 2x10     Bridge with Hip add n 5''x10     Bridge with TB hip adb n  n 5'' 1x10  5'' 1x6 RTB  ''    Bridge on Bosu with march       TAC n 5''x5     Reverse deadbug n 5''x10     Modified deadbug nv x20ea With PB    TAC 90/90 heel taps  TAC w/ march  nv    n X10ea    x10     Up,up-down,down    Bicycle kicks n x20ea Forward and reverse    Deadbug  2x10     Bridge w/ march  x10     SL bridge                     Strengthening: (standing)       Mini-squat n  n x10  x10 RTB around knees  Hip adductor ball squeeze    Wall sit p      Front Planks HEP 2x30s Modified on knees    Side plank with rotation reach  x10     3 way hip kicks nv x10ea No band, on 1/2 roll     Side step    Side step  Y    n X2 laps    X2 laps RTB 2 steps left, 1 step right  and return  Holding 13# KB    Monster Walk n X2 laps GTB    Hip hinge n 2x10     Hip hike nv X15 each Wood block    RDL y 2x10 U 13# KB FR for balance    Goblet squat y 2x10 18# KB    Slider lunge nv X10 each Side and rev    Pallof press y 2x10ea Black tubing  With OH lift    Shoulder ext with march n x10ea Black tubing    Farmer's carry with march y X2 laps With 18lb KB  *Pt has KB at home (lightest 18lbs)    Resisted march nv x20 Red Band at feet    PNF Chops/ lifts nv x15ea Black tubing  Down= double  Up= single    Step ups (Fwd) w/ hip flexion n 2x10 6 inch    Step ups (lateral) y 2x10 On BOSU    Planked hip ext nv 2x10 Red PB in chair     Resisted walk away nv x10ea 4 ways, CC 3.5 plates                   Total Time for Session Not performed Group CPT 34941   Re-evaluation  CPT 51415 Done Today Sets/Reps Comments Time   Re-evaluation

## 2025-01-23 NOTE — PROGRESS NOTES
Physical Therapy Discharge      PT DISCHARGE NOTE FOR OUTPATIENT THERAPY    Patient: Sharon Morales MRN: [id number]  : 1970 54 y.o.  Referring Physician: Damion Brown MD  Date of Visit: 2025      Certification Dates: 10/29/24 through 25    Total Visit Count: 19    Diagnosis:   1. Adductor tendinitis of right hip    2. S/P tendon repair        Chief Complaints:  No chief complaint on file.      Precautions:         TODAY'S VISIT:    Time In Session:  Start Time: 0306  Stop Time: 0346  Time Calculation (min): 40 min   General Information - 25 1508          Session Details    Document Type discharge evaluation     Mode of Treatment individual therapy        General Information    Referring Physician Damion Brown MD     History of present illness/functional impairment Pt reports R hip pain beginning in 2024 and is arriving with diagnosis of adductor strain s/p tenotomy performed on 10/10/24. Symptoms described to occur through anterior hip, groin, and upper thigh pain. During [facility], pt describes increase in pain the night following resistance-based exercise workout. Two days later she attended a bar class which significantly aggravated pain. Pt visited ortho in August who diagnosed her with a groin tear. She was instructed to rest and only walk on the treadmill or swim to tolerance. Pt followed these instructions, however pain continued to gradually worsen with activity to the point where she could not stand or walk and was admitted to the ED on 24. Pt returned to ortho who recommended tenotomy occurring about 2.5 weeks ago. Pt required to rest following procedure for 2 weeks with no lifting, only light walking. Pt still having similar pain post procedure compared to prior level. Pt has difficulty with bending, lifting, squatting, rising from a chair, stair negotiation, getting in/out of bath, and getting in/out of a car. Pt had f/u with ortho “a few days ago” who recommended PT  for eval and tx.   Significant PMHx includes: B/L knee replacement when she was 46yo, required revision for L one year later, and R the year after that; L hip replacement about 2 years ago (2022). Pt notes having gait deviations since her R knee replacement years ago which may have contributed to her symptoms.     Patient/Family/Caregiver Comments/Observations Pt reports that she has made good improvements since starting PT. She has no more functional limitations. Pt recently started with a  and has been doing core, upper body, and lower body exercises. She felt a little bit of pain with one core exercise, but beyond that she has had no issues with working out. Pt reports that she feels confident managing HEP/ exercises. She had some questions about progressed exercises. Pt comfortable with discharge.                    Daily Falls Screen - 01/23/25 1508          Daily Falls Assessment    Patient reported fall since last visit No                    Pain/Vitals - 01/23/25 1508          Pain Assessment    Currently in pain No/Denies     Pain Side/Orientation right     Pain: Body location Hip     Pain Level at Best 0     Pain Level at Worst 3                    PT - 01/23/25 1508          Physical Therapy    Physical Therapy [facility] PT        PT Plan    Frequency of treatment 2 times/week     PT Duration 8 weeks     PT Custom Frequency and Duration 1-2x a week, try dropping to 1/13/25     PT Cert From 10/29/24     PT Cert To 01/21/25     Date PT POC was sent to provider 10/29/24     Signed PT Plan of Care received?  Yes                    Assessment and Plan - 01/23/25 1508          Assessment    Plan of Care reviewed and patient/family in agreement Yes     System Pathology/Pathophysiology Noted musculoskeletal;neuromuscular     Functional Limitations in Following Categories (PT Eval) self-care;community/leisure     Rehab Potential/Prognosis good, to achieve stated therapy goals      Problem List decreased ROM;pain     Clinical Assessment Since starting PT,  Sharon has made improvements in pain levels, ROM, strength, and subjective outcomes. She has increased her hip flexion, extension, abduction, and external rotation ROM. She has also increased her hip strength globally as well as her knee strength increasing all by at least 1/2 MMT grade. In addition to ROM and strength, she increased her 30 second STS by 10 and her WOMAC decreased from 47% to 13.5%. She has also either met or come close to meeting the majority of her goals. She is independent with her updated HEP and is confident in her ability to maintain and further progress her physical status. Pt will be discharged due to the improvements listed above as well as her planned HEP compliance.     Plan and Recommendations DC                        OBJECTIVE MEASUREMENTS/DATA:    ROM    Range of Motion - 01/23/25 1500          RIGHT: Lower Extremity AROM Assessment    Hip Flexion   125 degrees     Hip Extension   10 degrees     Hip Abduction   53 degrees     Hip Internal Rotation   34 degrees     Hip External Rotation   35 degrees                   MMT    Manual Muscle Tests - 01/23/25 1508          RIGHT: Lower Extremity Manual Muscle Test Assessment    Hip Flexion gross movement (4+/5) good plus     Hip Extension gross movement (4+/5) good plus     Hip Abduction gross movement (4+/5) good plus     Hip Internal Rotation gross movement (4+/5) good plus     Hip External Rotation gross movement (4/5) good     Knee Flexion strength (4+/5) good plus     Knee Extension strength (5/5) normal        LEFT: Lower Extremity Manual Muscle Test Assessment    Hip Flexion gross movement (4+/5) good plus     Hip Extension gross movement (4+/5) good plus     Hip Abduction gross movement (5/5) normal     Hip Internal Rotation gross movement (4/5) good     Hip External Rotation gross movement (4/5) good     Knee Flexion strength (5/5) normal     Knee Extension  strength (5/5) normal                   Outcome Measures    PT Outcome Measures - 01/23/25 1508          Objective Outcome Measures    30 Second Sit to Stand Test 22 repetitions        Other Outcome Measures Used/Comments    Other outcome measure used: WOMAC: 13.5%                     ROM and MMT          10/29/2024 11/29/2024 1/2/2025 1/23/2025   PT LE ROM Measurements   PROM: Right Hip Flexion   125 degrees    AROM: Right Hip Flexion 102 degrees 114 degrees 114 degrees 125 degrees   AROM: Left Hip Flexion 118 degrees      AROM: Right Hip Extension 8 degrees       Discomfort thorugh front of hip 8 degrees       Discomfort thorugh front of hip  10 degrees   AROM: Left Hip Extension 8 degrees      AROM: Right Hip ABD 45 degrees 50 degrees 50 degrees 53 degrees   AROM: Left Hip ABD 42 degrees      AROM: Right Hip IR 40 degrees 40 degrees 40 degrees 34 degrees   AROM: Left Hip IR 35 degrees      AROM: Right Hip ER 30 degrees 35 degrees 35 degrees 35 degrees   AROM: Left Hip ER 25 degrees      PT LE MMT   Right Hip Flexion (4-/5) good minus (4/5) good (5/5) normal (4+/5) good plus   Left Hip Flexion (4+/5) good plus (4+/5) good plus (4+/5) good plus (4+/5) good plus   Right Hip Extension (4/5) good (4/5) good (4+/5) good plus (4+/5) good plus   Left Hip Extension (4+/5) good plus (4+/5) good plus (4+/5) good plus (4+/5) good plus   Right Hip ABD (4-/5) good minus (4-/5) good minus (4+/5) good plus (4+/5) good plus   Left Hip ABD (5/5) normal (5/5) normal (5/5) normal (5/5) normal   Right Hip ADD   (4/5) good       slight groin pain    Right Hip IR (4-/5) good minus (4/5) good (4/5) good (4+/5) good plus   Left Hip IR (4/5) good       pain in L groin (4/5) good       pain in L groin (4/5) good       pain in L groin (4/5) good   Right Hip ER (3+/5) fair plus       pain in R groin (4-/5) good minus       no pain (4/5) good (4/5) good   Left Hip ER (4-/5) good minus (4-/5) good minus (4-/5) good minus (4/5) good   Right  Knee Flexion (4/5) good (4+/5) good plus (5/5) normal (4+/5) good plus   Left Knee Flexion (5/5) normal (5/5) normal (5/5) normal (5/5) normal   Right Knee Extension (4+/5) good plus (4+/5) good plus (5/5) normal (5/5) normal   Left Knee Extension (5/5) normal (5/5) normal (5/5) normal (5/5) normal   Additional Manual Muscle Tests   Additional MMT  Plank endurance: Modified front plank= 31s       Outcome Measures          10/29/2024    12:02 11/29/2024    12:58 1/2/2025    12:02 1/23/2025    15:08   PT OBJECTIVE Outcome Measures   30 Second Sit to Stand 12 repetitions       1/10 pain through groing region on R 14 repetitions       slight groin pain 1/10 18 repetitions       mild fatigue 22 repetitions   PT SUBJECTIVE Outcome Measures   Other WOMAC: 47% WOMAC: 27% WOMAC: 17% WOMAC: 13.5%          Today's Treatment:    Education provided:  Yes: See treatment log for details of education provided  Methods: Discussion    Diagnosis       R Hip adductor strain s/p tenotomy Sharon Carmen   Precautions:   Eval: 10/29  PN: 11/29  PN: 12/27  Re-eval: 1/21  POC till: 1/21      Outcome Measure:  WOMAC     Total Time for Session Not performed Group CPT 81801   Modalities  CPT HP/CP 51511 Done Today Sets/Reps Comments Time   MHP       CP       Vaso               Total Time for Session Not performed Group CPT 61786   Gait Training  CPT 22114 Done Today Sets/Reps Comments Time                  Total Time for Session Not performed Group CPT 78303   Manual Therapy  CPT 84189 Done Today Sets/Reps Comments Time   STM   R proximal quads, hip adductors    IASTM   Hand held roller to proximal quads    Jt mobs       LAD y   LE [id number]     Total Time for Session Not performed Group CPT 51835   Nueromuscular Re-education  CPT 80027 Done Today Sets/Reps Comments Time   SLS       Tandem stance                      Total Time for Session 38-52 Minutes Group CPT 23424   Therapeutic Activity  CPT 24923 Done Today Sets/Reps Comments Time   Pt.  Education y  Pt education on examination findings, progress towards goals, PT POC, and HEP    Update HEP y  Added core strength to HEP    PN       Discharge y              Total Time for Session Not performed Group CPT 79479   Therapeutic Exercise  CPT 55351 Done Today Sets/Reps Comments Time          Recumbent Bike       Upright bike       Elliptical       Treadmill y 10:00 2.4 mph  3% incline                  Stretches:        Calf Stretch HEP :60 Has slantboard at home  Lvl 5    Adductor stretch n :30x3 SOS    Modified tequila N 3x:30 RLE; with SOS    Piriformis stretch N 2x:30 Pull towards opp shoulder and away    DKTC n :5x20 Red ball    Standing side lunge N :30x2 Standing at // bars    PQS n 3x30s R only    1/2 kneel HF stretch n 2x30s     HSS n SOS     Strengthening: (table/seated)       S/L Clams HEP 5'' 2x10 RTB    Reverse clam HEP 5'' 2x10 GTB    SLR with TAC       S/L Hip Add        S/L Hip Abd HEP x10     Bridge n 2x10     Bridge with Hip add n 5''x10     Bridge with TB hip adb n  n 5'' 1x10  5'' 1x6 RTB  ''    Bridge on Bosu with march       TAC n 5''x5     Reverse deadbug n 5''x10     Modified deadbug nv x20ea With PB    TAC 90/90 heel taps  TAC w/ march  nv    n X10ea    x10     Up,up-down,down    Bicycle kicks n x20ea Forward and reverse    Deadbug  2x10     Bridge w/ march  x10     SL bridge                     Strengthening: (standing)       Mini-squat n  n x10  x10 RTB around knees  Hip adductor ball squeeze    Wall sit p      Front Planks HEP 2x30s Modified on knees    Side plank with rotation reach  x10     3 way hip kicks nv x10ea No band, on 1/2 roll     Side step    Side step  Y    n X2 laps    X2 laps RTB 2 steps left, 1 step right  and return  Holding 13# KB    Monster Walk n X2 laps GTB    Hip hinge n 2x10     Hip hike nv X15 each Wood block    RDL y 2x10 U 13# KB FR for balance    Goblet squat y 2x10 18# KB    Slider lunge nv X10 each Side and rev    Pallof press y 2x10ea Black  tubing  With OH lift    Shoulder ext with march n x10ea Black tubing    Farmer's carry with march y X2 laps With 18lb KB  *Pt has KB at home (lightest 18lbs)    Resisted march nv x20 Red Band at feet    PNF Chops/ lifts nv x15ea Black tubing  Down= double  Up= single    Step ups (Fwd) w/ hip flexion n 2x10 6 inch    Step ups (lateral) y 2x10 On BOSU    Planked hip ext nv 2x10 Red PB in chair     Resisted walk away nv x10ea 4 ways, CC 3.5 plates                   Total Time for Session Not performed Group CPT 11823   Re-evaluation  CPT 12385 Done Today Sets/Reps Comments Time   Re-evaluation                    Goals Addressed                      This Visit's Progress      Mutually agreed upon pain goal         Mutually agreed upon pain goal: 0/10      1/2: Pain at worst 4/10    1/23: Pain at worst 3/10        Patient Stated (pt-stated)         Pt will be able to return to resistance training at the gym without pain.     11/29: Pt has not returned to resistance training at gym as per PT's recommendations; Pt has returned to back stroke swimming and higher level ADLs     1/2: Pt returned to light resistance training with no complaints. Pt has also added kicking with swimming. Pt has been away for the past 1-2 weeks on vacation and has yet to return to gym since, she plans to start back this week.     1/23: Met.         PT R hip Goals         Short Term Goals Time Frame (weeks) Result Comment/Progress   Pt will be I with HEP to promote ability to self manage symptoms 4 Goal met    Pain at worst rated no greater than 5/10 for improved QOL 4 met    Joint ROM will improve by 5 deg to promote improved tolerance to ADLs 4 met    Pt will improve LE strength where limited by 1/2 MMT grade to promote ability to ascend stairs 4 met    Subjective outcome scores will improve by 9 points 4 met    Pt will increase 30s STS by 1 rep to indicate improved endurance 4 met    Pt will perform a functional squat without genu valgus 4 met         Long Term Goals Time Frame (weeks) Result Comment/Progress   Pt will be I and compliant with updated HEP 8 Met    Pain at worst will decrease to no greater than 1/10 for improved QOL 8 30%  Pain at worst 3/10   Joint ROM will improve by 15-20 deg to promote improved tolerance to ADLs 8 20% Met for hip flexion   Pt will improve LE strength where limited by 1 MMT grade to promote improved ability to ascend stairs 8 30% Met for hip abduction and IR    Subjective outcome scores will improve by 18 points 8 Met    Pt will increase 30s STS to >/= 15 reps to indicate improved endurance 8 met Increased by 10   Pt will perform single leg functional squats without genu valgus or pain 8 N/a

## 2025-03-03 RX ORDER — LEVOTHYROXINE SODIUM 100 UG/1
TABLET ORAL
Qty: 90 TABLET | Refills: 1 | Status: SHIPPED | OUTPATIENT
Start: 2025-03-03

## 2025-03-06 ENCOUNTER — OFFICE VISIT (OUTPATIENT)
Dept: PRIMARY CARE | Facility: CLINIC | Age: 55
End: 2025-03-06
Payer: COMMERCIAL

## 2025-03-06 VITALS
DIASTOLIC BLOOD PRESSURE: 70 MMHG | TEMPERATURE: 98.5 F | RESPIRATION RATE: 17 BRPM | HEIGHT: 58 IN | HEART RATE: 84 BPM | BODY MASS INDEX: 23.93 KG/M2 | WEIGHT: 114 LBS | OXYGEN SATURATION: 98 % | SYSTOLIC BLOOD PRESSURE: 100 MMHG

## 2025-03-06 DIAGNOSIS — Z00.00 ROUTINE PHYSICAL EXAMINATION: Primary | ICD-10-CM

## 2025-03-06 DIAGNOSIS — H04.123 DRY EYES: ICD-10-CM

## 2025-03-06 DIAGNOSIS — E03.9 HYPOTHYROIDISM, UNSPECIFIED TYPE: ICD-10-CM

## 2025-03-06 DIAGNOSIS — Z01.84 IMMUNITY STATUS TESTING: ICD-10-CM

## 2025-03-06 DIAGNOSIS — G47.09 OTHER INSOMNIA: ICD-10-CM

## 2025-03-06 DIAGNOSIS — D72.819 LEUKOPENIA, UNSPECIFIED TYPE: ICD-10-CM

## 2025-03-06 DIAGNOSIS — D64.9 ANEMIA, UNSPECIFIED TYPE: ICD-10-CM

## 2025-03-06 PROBLEM — M75.52 BILATERAL SHOULDER BURSITIS: Status: RESOLVED | Noted: 2024-03-07 | Resolved: 2025-03-06

## 2025-03-06 PROBLEM — H02.30 EXCESS SKIN OF EYELID: Status: RESOLVED | Noted: 2023-09-08 | Resolved: 2025-03-06

## 2025-03-06 PROBLEM — M75.51 BILATERAL SHOULDER BURSITIS: Status: RESOLVED | Noted: 2024-03-07 | Resolved: 2025-03-06

## 2025-03-06 PROCEDURE — 3008F BODY MASS INDEX DOCD: CPT | Performed by: FAMILY MEDICINE

## 2025-03-06 PROCEDURE — 99396 PREV VISIT EST AGE 40-64: CPT | Performed by: FAMILY MEDICINE

## 2025-03-06 ASSESSMENT — ENCOUNTER SYMPTOMS
SORE THROAT: 0
MYALGIAS: 0
SLEEP DISTURBANCE: 1
ARTHRALGIAS: 0
CHILLS: 0
COUGH: 0
FEVER: 0
SHORTNESS OF BREATH: 0
CONSTIPATION: 0
HEADACHES: 0
NERVOUS/ANXIOUS: 0
DYSURIA: 0
ABDOMINAL PAIN: 0
DIZZINESS: 0
RHINORRHEA: 0
HEMATURIA: 0
WHEEZING: 0
FREQUENCY: 0
PALPITATIONS: 0
FATIGUE: 0
BLOOD IN STOOL: 0

## 2025-03-06 NOTE — PROGRESS NOTES
Subjective      Patient ID: Sharon Morales is a 54 y.o. female.    She is here for physical. Pt has been doing pretty good overall. She is trying to follow well balanced meals. Stays well hydrated. Stays well hydrated. She is doing 3x wk swimming and walking daily and strength training 3x wk and rowing machine. Average 7hrs of sleep a night- she does not sleep well and doesn't fall asleep and will usually need to take advil pm or unisom.  Normal BMS and urination. UTD with colonoscopy 2021 and due in 5yrs. UTD with mammo. UTD with eye and dental exam. UTD with derm. UTD with gyn care. Due for fasting labs. 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 Postmenopausal.         The following have been reviewed and updated as appropriate in this visit:   Tobacco  Allergies  Meds  Problems  Med Hx  Surg Hx  Fam Hx       Review of Systems   Constitutional:  Negative for chills, fatigue and fever.   HENT:  Negative for ear discharge, ear pain, postnasal drip, rhinorrhea and sore throat.    Respiratory:  Negative for cough, shortness of breath and wheezing.    Cardiovascular:  Negative for chest pain and palpitations.   Gastrointestinal:  Negative for abdominal pain, blood in stool and constipation.   Genitourinary:  Negative for dysuria, frequency and hematuria.   Musculoskeletal:  Negative for arthralgias and myalgias.   Skin:  Negative for rash.   Neurological:  Negative for dizziness and headaches.   Psychiatric/Behavioral:  Positive for sleep disturbance. Negative for suicidal ideas. The patient is not nervous/anxious.        Current Outpatient Medications   Medication Sig Dispense Refill    estradioL (ESTRACE) 0.01 % (0.1 mg/gram) vaginal cream USE VAGINALLY AS DIRECTED TWICE DAILY FOR 2 WEEKS, THEN TWICE WEEKLY THEREAFTER      FISH OIL-omega-3 fatty acids (FISH OIL) 340-1,000 mg capsule Take by mouth 2 (two) times a day.      ibuprofen (MOTRIN) 600 mg tablet Take 600  mg by mouth 3 (three) times a day as needed for mild pain or moderate pain.      Lactobac no.41/Bifidobact no.7 (PROBIOTIC-10 ORAL) Take by mouth.      levothyroxine (SYNTHROID) 100 mcg tablet TAKE 1 TABLET BY MOUTH EVERY DAY 90 tablet 1    multivitamin tablet Take by mouth daily.      RESTASIS 0.05 % ophthalmic emulsion       valACYclovir (VALTREX) 1 gram tablet as needed.      naproxen (NAPROSYN) 500 mg tablet Take 1 tablet (500 mg total) by mouth 2 (two) times a day with meals. 30 tablet 0     No current facility-administered medications for this visit.     Past Medical History:   Diagnosis Date    Arthritis     Knees -sp B/L TKR    At risk for venous thromboembolism (VTE) 2018    COVID-19 2022    Hypothyroidism     Kidney stones     Leukopenia 2018    Motion sickness     PONV (postoperative nausea and vomiting)     Seasonal allergies     Snores      Family History   Problem Relation Name Age of Onset    Hypertension Biological Mother      COPD Biological Mother      Melanoma Biological Mother      Melanoma Biological Father      Skin cancer Biological Father      Cancer Maternal Grandmother      Breast cancer Father's Sister          70's     Past Surgical History   Procedure Laterality Date    Breast lumpectomy Left 2023    s/p benign    Bunionectomy Bilateral      section      x 3    Colonoscopy  2021    every 5yrs    Joint replacement Bilateral     KNEE TOTAL REVISION Left 2018    Performed by Feng Gross MD at  [facility]    Nasal sinus surgery  2021    Revision total knee arthroplasty Right 2018    Total hip arthroplasty Left 2023     Social History     Socioeconomic History    Marital status:      Spouse name: Not on file    Number of children: 4    Years of education: Not on file    Highest education level: Not on file   Occupational History    Occupation: Social Workers   Tobacco Use    Smoking status: Former     Types: Cigarettes     Passive exposure:  "Never    Smokeless tobacco: Never    Tobacco comments:     Social in college   Vaping Use    Vaping status: Never Used   Substance and Sexual Activity    Alcohol use: Yes     Alcohol/week: 1.0 standard drink of alcohol     Types: 1 Glasses of wine per week     Comment: occas    Drug use: No    Sexual activity: Yes     Partners: Male   Other Topics Concern    Not on file   Social History Narrative    Not on file     Social Drivers of Health     Financial Resource Strain: Not on file   Food Insecurity: No Food Insecurity (10/29/2024)    Hunger Vital Sign     Worried About Running Out of Food in the Last Year: Never true     Ran Out of Food in the Last Year: Never true   Transportation Needs: Not on file   Physical Activity: Not on file   Stress: Not on file   Social Connections: Not on file   Intimate Partner Violence: Not on file   Housing Stability: Not on file     Allergies   Allergen Reactions    Avocado      Other reaction(s): vomiting    Chloraprep Clear [Chlorhexidin-Isopropyl Alcohol] Itching    Isopropyl Alcohol     Dermabond [Liquid Bandage (Cyanoacrylate)] Rash     surgical glue       Objective   Visit Vitals  /70 (BP Location: Left upper arm, Patient Position: Sitting)   Pulse 84   Temp 36.9 °C (98.5 °F) (Temporal)   Resp 17   Ht 1.473 m (4' 10\")   Wt 51.7 kg (114 lb)   LMP 12/03/2019   SpO2 98%   BMI 23.83 kg/m²     Physical Exam  Vitals and nursing note reviewed.   Constitutional:       Appearance: Normal appearance.   HENT:      Right Ear: Tympanic membrane normal. There is no impacted cerumen.      Left Ear: Tympanic membrane normal. There is no impacted cerumen.      Nose: Nose normal. No rhinorrhea.      Mouth/Throat:      Mouth: Mucous membranes are moist.      Pharynx: Oropharynx is clear. No posterior oropharyngeal erythema.   Cardiovascular:      Rate and Rhythm: Normal rate and regular rhythm.      Heart sounds: No murmur heard.  Pulmonary:      Effort: Pulmonary effort is normal.      " Breath sounds: Normal breath sounds. No wheezing.   Abdominal:      General: Bowel sounds are normal.      Palpations: Abdomen is soft.      Tenderness: There is no abdominal tenderness.   Musculoskeletal:         General: Normal range of motion.      Cervical back: Normal range of motion and neck supple.   Skin:     General: Skin is warm.      Findings: No rash.   Neurological:      General: No focal deficit present.      Mental Status: She is alert and oriented to person, place, and time.      Cranial Nerves: No cranial nerve deficit.   Psychiatric:         Mood and Affect: Mood normal.         Behavior: Behavior normal.         Assessment/Plan   Problem List Items Addressed This Visit          Nervous    Other insomnia       Endocrine/Metabolic    Hypothyroidism    Relevant Orders    T4, free    T4, free    TSH       Hematologic    Leukopenia       Eye    Dry eyes     Other Visit Diagnoses       Routine physical examination    -  Primary    Relevant Orders    Lipid panel    CBC and differential    Comprehensive metabolic panel    Anemia, unspecified type        Relevant Orders    CBC and differential    Iron and TIBC    Ferritin    Immunity status testing        Relevant Orders    Measles antibody, IgG    Mumps antibody, IgG    Rubella antibody, IgG        She is doing pretty good overall. Discussed cont diet and exercise. Will check updated labs. UTD with eye and dental exam. UTD with gyn care. Will do mammo soon. UTD with colonoscopy. She will do pneumonia and Tdap later. Will check updated fasting labs. UTD with derm. Will check MMR titers. We discussed her sleep and she will try CBT gummies b/c has done OTC meds and will keep me updated (we discussed medical marijuana or trazodone).     Natalia Wilson, DO  3/6/2025

## 2025-03-23 ENCOUNTER — RESULTS FOLLOW-UP (OUTPATIENT)
Dept: PRIMARY CARE | Facility: CLINIC | Age: 55
End: 2025-03-23

## 2025-03-23 DIAGNOSIS — D70.9 NEUTROPENIA, UNSPECIFIED TYPE (CMS/HCC): Primary | ICD-10-CM

## 2025-03-23 LAB
ALBUMIN SERPL-MCNC: 4.4 G/DL (ref 3.6–5.1)
ALBUMIN/GLOB SERPL: 2 (CALC) (ref 1–2.5)
ALP SERPL-CCNC: 74 U/L (ref 37–153)
ALT SERPL-CCNC: 15 U/L (ref 6–29)
AST SERPL-CCNC: 24 U/L (ref 10–35)
BASOPHILS # BLD AUTO: 41 CELLS/UL (ref 0–200)
BASOPHILS NFR BLD AUTO: 1.4 %
BILIRUB SERPL-MCNC: 0.3 MG/DL (ref 0.2–1.2)
BUN SERPL-MCNC: 24 MG/DL (ref 7–25)
BUN/CREAT SERPL: NORMAL (CALC) (ref 6–22)
CALCIUM SERPL-MCNC: 9.5 MG/DL (ref 8.6–10.4)
CHLORIDE SERPL-SCNC: 106 MMOL/L (ref 98–110)
CHOLEST SERPL-MCNC: 191 MG/DL
CHOLEST/HDLC SERPL: 2.6 (CALC)
CO2 SERPL-SCNC: 30 MMOL/L (ref 20–32)
CREAT SERPL-MCNC: 0.73 MG/DL (ref 0.5–1.03)
EGFRCR SERPLBLD CKD-EPI 2021: 96 ML/MIN/1.73M2
EOSINOPHIL # BLD AUTO: 128 CELLS/UL (ref 15–500)
EOSINOPHIL NFR BLD AUTO: 4.4 %
ERYTHROCYTE [DISTWIDTH] IN BLOOD BY AUTOMATED COUNT: 12.4 % (ref 11–15)
FERRITIN SERPL-MCNC: 22 NG/ML (ref 16–232)
GLOBULIN SER CALC-MCNC: 2.2 G/DL (CALC) (ref 1.9–3.7)
GLUCOSE SERPL-MCNC: 83 MG/DL (ref 65–99)
HCT VFR BLD AUTO: 42.6 % (ref 35–45)
HDLC SERPL-MCNC: 74 MG/DL
HGB BLD-MCNC: 14 G/DL (ref 11.7–15.5)
IRON SATN MFR SERPL: 42 % (CALC) (ref 16–45)
IRON SERPL-MCNC: 125 MCG/DL (ref 45–160)
LDLC SERPL CALC-MCNC: 102 MG/DL (CALC)
LYMPHOCYTES # BLD AUTO: 1212 CELLS/UL (ref 850–3900)
LYMPHOCYTES NFR BLD AUTO: 41.8 %
MCH RBC QN AUTO: 31.1 PG (ref 27–33)
MCHC RBC AUTO-ENTMCNC: 32.9 G/DL (ref 32–36)
MCV RBC AUTO: 94.7 FL (ref 80–100)
MEV IGG SER IA-ACNC: 82.1 AU/ML
MONOCYTES # BLD AUTO: 444 CELLS/UL (ref 200–950)
MONOCYTES NFR BLD AUTO: 15.3 %
MUV IGG SER IA-ACNC: 190 AU/ML
NEUTROPHILS # BLD AUTO: 1076 CELLS/UL (ref 1500–7800)
NEUTROPHILS NFR BLD AUTO: 37.1 %
NONHDLC SERPL-MCNC: 117 MG/DL (CALC)
PLATELET # BLD AUTO: 239 THOUSAND/UL (ref 140–400)
PMV BLD REES-ECKER: 10 FL (ref 7.5–12.5)
POTASSIUM SERPL-SCNC: 4.8 MMOL/L (ref 3.5–5.3)
PROT SERPL-MCNC: 6.6 G/DL (ref 6.1–8.1)
RBC # BLD AUTO: 4.5 MILLION/UL (ref 3.8–5.1)
RUBV IGG SERPL IA-ACNC: 2.13 INDEX
SODIUM SERPL-SCNC: 141 MMOL/L (ref 135–146)
T4 FREE SERPL-MCNC: 1.4 NG/DL (ref 0.8–1.8)
TIBC SERPL-MCNC: 301 MCG/DL (CALC) (ref 250–450)
TRIGL SERPL-MCNC: 63 MG/DL
TSH SERPL-ACNC: 1.42 MIU/L (ref 0.4–4.5)
WBC # BLD AUTO: 2.9 THOUSAND/UL (ref 3.8–10.8)

## 2025-03-24 ENCOUNTER — APPOINTMENT (OUTPATIENT)
Dept: RADIOLOGY | Age: 55
End: 2025-03-24
Payer: COMMERCIAL

## 2025-04-25 ENCOUNTER — APPOINTMENT (OUTPATIENT)
Dept: RADIOLOGY | Age: 55
End: 2025-04-25
Payer: COMMERCIAL

## 2025-04-30 ENCOUNTER — OFFICE VISIT (OUTPATIENT)
Dept: SURGERY | Facility: CLINIC | Age: 55
End: 2025-04-30
Payer: COMMERCIAL

## 2025-04-30 VITALS
WEIGHT: 115.4 LBS | HEART RATE: 86 BPM | BODY MASS INDEX: 24.22 KG/M2 | TEMPERATURE: 103.5 F | DIASTOLIC BLOOD PRESSURE: 77 MMHG | SYSTOLIC BLOOD PRESSURE: 117 MMHG | OXYGEN SATURATION: 99 % | HEIGHT: 58 IN

## 2025-04-30 DIAGNOSIS — Z91.89 AT RISK FOR BREAST CANCER: ICD-10-CM

## 2025-04-30 DIAGNOSIS — Z80.3 FAMILY HISTORY OF MALIGNANT NEOPLASM OF BREAST: Primary | ICD-10-CM

## 2025-04-30 DIAGNOSIS — R92.30 BREAST DENSITY: ICD-10-CM

## 2025-04-30 PROCEDURE — 99214 OFFICE O/P EST MOD 30 MIN: CPT | Performed by: SURGERY

## 2025-04-30 PROCEDURE — 3008F BODY MASS INDEX DOCD: CPT | Performed by: SURGERY

## 2025-05-14 ENCOUNTER — TELEPHONE (OUTPATIENT)
Dept: GENETICS | Facility: HOSPITAL | Age: 55
End: 2025-05-14
Payer: COMMERCIAL

## 2025-08-25 RX ORDER — LEVOTHYROXINE SODIUM 100 UG/1
100 TABLET ORAL DAILY
Qty: 90 TABLET | Refills: 1 | Status: SHIPPED | OUTPATIENT
Start: 2025-08-25

## (undated) DEVICE — SUCTION 18FR FRAZIER DISPOSABLE

## (undated) DEVICE — GLOVE LINER PROTEXIS 9.0 PI BLUE

## (undated) DEVICE — ***USE 121490***PACK KNEE ADD ON

## (undated) DEVICE — SUTURE QUILL PDO 0 RA-1067Q

## (undated) DEVICE — KIT CEMENT MIXING CARTRIDGE AND NOZZLE

## (undated) DEVICE — CUFF TOURNIQUET DISP 34 X 4

## (undated) DEVICE — SET HANDPIECE INTERPULSE

## (undated) DEVICE — ***USE 57698*** SLEEVE FLOWTRON DVT CALF SINGLE USE

## (undated) DEVICE — PACK DRAPE TABLE

## (undated) DEVICE — BANDAGE FLEX MASTER  6IN

## (undated) DEVICE — GOWN SURG X-LARGE MICROCOOL

## (undated) DEVICE — BLADE SAGGITAL FLARED 2108-382-3

## (undated) DEVICE — HANDPIECE VERSAJET EXACT 45º X 14MM

## (undated) DEVICE — BLADE SCALPEL #10

## (undated) DEVICE — NEEDLE DISP HYPO 18GX1-1/2IN

## (undated) DEVICE — HOOD FLYTE SURGICOOL

## (undated) DEVICE — BAG DECANTER

## (undated) DEVICE — NEEDLE DISP SPINAL 22GX3-1/2IN

## (undated) DEVICE — Device

## (undated) DEVICE — PACK UNIVERSAL TOTAL JOINT

## (undated) DEVICE — KIT LEG STABILIZATION SPIDER

## (undated) DEVICE — APPLICATOR CHLORAPREP 26ML ORANGE TINT

## (undated) DEVICE — SUTURE QUILL 2 PDO RX-2066Q

## (undated) DEVICE — SOLN IRRIG STER WATER 1000ML

## (undated) DEVICE — MANIFOLD FOUR PORT NEPTUNE

## (undated) DEVICE — SYRINGE DISP LUER-LOK 30 CC

## (undated) DEVICE — CLOTH PREPPING SAGE 2% CHG 2/PK

## (undated) DEVICE — DRESSING TEGADERM 4X4 3/4

## (undated) DEVICE — TUBING SMOKE EVAC PENCIL COATED

## (undated) DEVICE — GLOVE PROTEXIS PI ORTHO 9.0

## (undated) DEVICE — SOLN IRRIG .9%SOD 1000ML

## (undated) DEVICE — KIT TOTAL HIP PREP - IM

## (undated) DEVICE — DRAPE LARGE REVERSE FOLD

## (undated) DEVICE — PAD GROUND ELECTROSURGICAL W/CORD

## (undated) DEVICE — DRAPE SPIDER2 SWITCH

## (undated) DEVICE — VEST STERILE

## (undated) DEVICE — GOWN SURGICAL MICROCOOL IMPERVIOUS XXL

## (undated) DEVICE — CONTAINER SPECIMEN STERILE 5OZ